# Patient Record
Sex: FEMALE | Race: WHITE | NOT HISPANIC OR LATINO | Employment: FULL TIME | URBAN - NONMETROPOLITAN AREA
[De-identification: names, ages, dates, MRNs, and addresses within clinical notes are randomized per-mention and may not be internally consistent; named-entity substitution may affect disease eponyms.]

---

## 2017-04-14 DIAGNOSIS — K21.9 GASTROESOPHAGEAL REFLUX DISEASE, ESOPHAGITIS PRESENCE NOT SPECIFIED: ICD-10-CM

## 2017-04-14 RX ORDER — OMEPRAZOLE 40 MG/1
CAPSULE, DELAYED RELEASE ORAL
Qty: 30 CAPSULE | Refills: 2 | Status: SHIPPED | OUTPATIENT
Start: 2017-04-14 | End: 2017-06-29

## 2017-05-01 ENCOUNTER — TRANSFERRED RECORDS (OUTPATIENT)
Dept: HEALTH INFORMATION MANAGEMENT | Facility: HOSPITAL | Age: 22
End: 2017-05-01

## 2017-06-16 ENCOUNTER — HOSPITAL ENCOUNTER (EMERGENCY)
Facility: HOSPITAL | Age: 22
Discharge: HOME | End: 2017-06-16
Attending: EMERGENCY MEDICINE | Admitting: EMERGENCY MEDICINE
Payer: COMMERCIAL

## 2017-06-16 VITALS
SYSTOLIC BLOOD PRESSURE: 115 MMHG | TEMPERATURE: 98.1 F | DIASTOLIC BLOOD PRESSURE: 95 MMHG | OXYGEN SATURATION: 99 % | RESPIRATION RATE: 16 BRPM

## 2017-06-16 DIAGNOSIS — F45.8 HYPERVENTILATION SYNDROME: ICD-10-CM

## 2017-06-16 DIAGNOSIS — R07.89 CHEST WALL PAIN: ICD-10-CM

## 2017-06-16 PROCEDURE — 96372 THER/PROPH/DIAG INJ SC/IM: CPT

## 2017-06-16 PROCEDURE — 99283 EMERGENCY DEPT VISIT LOW MDM: CPT | Performed by: EMERGENCY MEDICINE

## 2017-06-16 PROCEDURE — 99284 EMERGENCY DEPT VISIT MOD MDM: CPT | Mod: 25

## 2017-06-16 PROCEDURE — 25000132 ZZH RX MED GY IP 250 OP 250 PS 637: Performed by: EMERGENCY MEDICINE

## 2017-06-16 PROCEDURE — 25000128 H RX IP 250 OP 636: Performed by: EMERGENCY MEDICINE

## 2017-06-16 RX ORDER — CLONAZEPAM 1 MG/1
1 TABLET ORAL 2 TIMES DAILY
Status: DISCONTINUED | OUTPATIENT
Start: 2017-06-16 | End: 2017-06-16 | Stop reason: HOSPADM

## 2017-06-16 RX ORDER — KETOROLAC TROMETHAMINE 30 MG/ML
60 INJECTION, SOLUTION INTRAMUSCULAR; INTRAVENOUS ONCE
Status: COMPLETED | OUTPATIENT
Start: 2017-06-16 | End: 2017-06-16

## 2017-06-16 RX ADMIN — KETOROLAC TROMETHAMINE 60 MG: 30 INJECTION, SOLUTION INTRAMUSCULAR at 14:23

## 2017-06-16 RX ADMIN — CLONAZEPAM 1 MG: 1 TABLET ORAL at 14:23

## 2017-06-16 NOTE — ED NOTES
Discharge instructions reviewed with patient. No questions or concerns. Denies any pain. Encouraged to return with new or worsening symptoms.

## 2017-06-16 NOTE — ED AVS SNAPSHOT
[facility]    [address] [place] [address]    [place] [address]    Phone:  [phone]                                       Karrie Oliveira   MRN: [id number]    Department:  [facility]   Date of Visit:  6/16/2017           Patient Information     Date Of Birth          1995        Your diagnoses for this visit were:     Chest wall pain     Hyperventilation syndrome        You were seen by Kailash Severino MD.      Follow-up Information     Follow up with Jenae Hayward MD.    Specialty:  Family Practice    Why:  As needed    Contact information:    [facility]  [address] [place] [address]  [phone]          Discharge Instructions         Chest Wall Pain: Costochondritis    The chest pain that you have had today is caused by costochondritis. This condition is caused by an inflammation of the cartilage joining your ribs to your breastbone. It is not caused by heart or lung problems. Your healthcare team has made sure that the chest pain you feel is not from a life threatening cause of chest pain such as heart attack, collapsed lung, blood clot in the lung, tear in the aorta, or esophageal rupture. The inflammation may have been brought on by a blow to the chest, lifting heavy objects, intense exercise, or an illness that made you cough and sneeze a lot. It often occurs during times of emotional stress. It can be painful, but it is not dangerous. It usually goes away in 1 to 2 weeks. But it may happen again. Rarely, a more serious condition may cause symptoms similar to costochondritis. That s why it s important to watch for the warning signs listed below.  Home care  Follow these guidelines when caring for yourself at home:    If you feel that emotional stress is a cause of your condition, try to figure out the sources of that stress. It may not be obvious. Learn ways to deal with the stress in your life. This can include regular exercise, muscle relaxation,  meditation, or simply taking time out for yourself.    You may use acetaminophen, ibuprofen, or naproxen to control pain, unless another pain medicine was prescribed. If you have liver or kidney disease or ever had a stomach ulcer, talk with your healthcare provider before using these medicines.    You can also help ease pain by using a hot, wet compress or heating pad. Use this with or without a medicated skin cream that helps relieves pain.    Do stretching exercise as advised by your provider.    Take any prescribed medicines as directed.  Follow-up care  Follow up with your healthcare provider, or as advised, if you do not start to get better in the next 2 days.  When to seek medical advice  Call your healthcare provider right away if any of these occur:    A change in the type of pain. Call if it feels different, becomes more serious, lasts longer, or spreads into your shoulder, arm, neck, jaw, or back.    Shortness of breath or pain gets worse when you breathe    Weakness, dizziness, or fainting    Cough with dark-colored sputum (phlegm) or blood    Abdominal pain    Dark red or black stools    Fever of 100.4 F (38 C) or higher, or as directed by your healthcare provider  Date Last Reviewed: 12/1/2016 2000-2017 [employer]. [address] [place] [address], [place] [address]. All rights reserved. This information is not intended as a substitute for professional medical care. Always follow your healthcare professional's instructions.          Hyperventilation Syndrome  Hyperventilation syndrome is the medical term for losing control of your breathing. You may find yourself breathing too fast or too deeply. This can be triggered by pain, anxiety, or emotional stress. If hyperventilation continues for more than a few minutes, it can lead to a number of frightening symptoms, such as:    Numbness and tingling of the hands, feet, and face    Clenching of the fingers or toes    Dizziness    Feeling like you  cannot get enough air    Chest pains    Fainting or feeling like you are going to faint  Once these symptoms begin, it is often hard to stop them, because they lead to a cycle of more anxiety and more hyperventilation. It is important to understand that this is not a life-threatening condition. It will pass once you are able to relax. Relaxation and stress-management techniques can be learned and practiced when you are not hyperventilating. These techniques can help in the event of a future attack.  Home care  Rest today until you feel back to normal. If symptoms return, take the following steps to care for yourself:    Sit or lie down. Remember that what is happening to you is temporary and will pass.    Use the relaxation methods you have learned.  Note: It is no longer recommended to breathe into a paper bag.  Follow-up care  Follow up with your healthcare provider, or as advised.  When to seek medical advice  Call your healthcare provider right away if any of these occur:    Fever of 100.4 F (38 C) or higher    Redness, pain, or swelling of the leg    Ringing in your ears    Severe headache  Call 911, or get immediate medical care  Contact emergency services right away if any of these occur:    Weakness or fainting    Increasing shortness of breath    Coughing up blood    Chest pain that is made worse with each breath  Date Last Reviewed: 9/13/2015 2000-2017 [employer]. [address] [place] [address], [place] [address]. All rights reserved. This information is not intended as a substitute for professional medical care. Always follow your healthcare professional's instructions.          Understanding Hyperventilation Syndrome  When you breathe, you get oxygen from the air you inhale. You then let out carbon dioxide with the air you exhale. Hyperventilation syndrome is a pattern of breathing during which you breathe more quickly and deeply than normal. This can be very distressing to experience. If it  goes on for some time, it can cause the level of carbon dioxide in the blood to get too low. This can lead to symptoms felt throughout the body.  What causes hyperventilation syndrome?  Hyperventilation syndrome may be caused from things such as:    Anxiety or panic (most common)    Pregnancy    Bleeding    Infection    Certain heart and lung problems  Symptoms of hyperventilation syndrome  Symptoms can include:    Fast or deep breathing    Shortness of breath or the feeling that you can t get enough air    Anxiety, fear, panic, or strong feeling of dread or doom    Dizziness    Chest pain or squeezing in the chest    Fast, pounding, or skipping heartbeat    Sweating    Numbness or tingling around the mouth and in the fingers    Muscle cramps in the hands or feet  Treatment for hyperventilation syndrome  Treatment is focused on getting your breathing rate and level of carbon dioxide in the blood back to normal. If you are being treated in a hospital or healthcare provider s office, the following may be done:    A healthcare provider may monitor the level of oxygen in your blood with a pulse oximeter.    A nurse or other healthcare provider will talk with you and help you to stay calm.    You may be asked to try various breathing exercises, such as pursed-lip breathing. This helps slow down your breathing. You may also be asked to hold your breath for short periods.    If needed, you may be told to breathe into a tube.    You may also be given medicine to help you relax.  How can hyperventilation syndrome be prevented?  To help prevent further episodes in the future, you may be told to try:    Breathing exercises    Relaxation methods such as meditation or progressive muscle relaxation    Regular exercise    Counseling or medicines to help manage an anxiety or panic disorder  Possible complications of hyperventilation syndrome  If the level of carbon dioxide becomes dangerously low, this is called hypocapnia. It can  upset the acid-base balance in the blood and cause problems such as fainting and seizures.  Other possible complications of hyperventilation syndrome will vary depending on the cause.  When to call your healthcare provider  Call your healthcare provider right away if you have any of these:    Fever of 100.4 F (38 C) or higher, or as directed    Symptoms that don t get better with treatment, or symptoms that get worse    New symptoms   Date Last Reviewed: 5/1/2016 2000-2017 [employer]. [address] [place] [address], [place] [address]. All rights reserved. This information is not intended as a substitute for professional medical care. Always follow your healthcare professional's instructions.      Karrie,   Your chest wall pain behind your sternum (breast bone) seemed to trigger a panic/hyperventilation episode after you got out of the cold water shivering.  The numbness, tingling, lightheadedness with visual type changes all are part of a severe hyperventilation.  Fortunately, you were able to get these to reverse by relaxing.   Wait another 2 weeks before hitting the beach again.   In the meantime enjoy the summer.   Good luck!        Review of your medicines      Our records show that you are taking the medicines listed below. If these are incorrect, please call your family doctor or clinic.        Dose / Directions Last dose taken    ibuprofen 200 MG capsule   Dose:  600 mg   Quantity:  120 capsule        Take 600 mg by mouth every 6 hours as needed   Refills:  0        omeprazole 40 MG capsule   Commonly known as:  priLOSEC   Quantity:  30 capsule        TAKE 1 CAPSULE BY MOUTH ONC E DAILY   Refills:  2                Orders Needing Specimen Collection     None      Pending Results     No orders found from 6/14/2017 to 6/17/2017.            Pending Culture Results     No orders found from 6/14/2017 to 6/17/2017.            Thank you for choosing [facility]       Thank you for choosing [facility] for your  "care. Our goal is always to provide you with excellent care. Hearing back from our patients is one way we can continue to improve our services. Please take a few minutes to complete the written survey that you may receive in the mail after you visit with us. Thank you!        [employer] Information     [employer] lets you send messages to your doctor, view your test results, renew your prescriptions, schedule appointments and more. To sign up, go to [email or web address] . Click on \"Log in\" on the left side of the screen, which will take you to the Welcome page. Then click on \"Sign up Now\" on the right side of the page.     You will be asked to enter the access code listed below, as well as some personal information. Please follow the directions to create your username and password.     Your access code is: [email or web address]  Expires: 2017  2:26 PM     Your access code will  in 90 days. If you need help or a new code, please call your [facility] clinic or [phone].        Care EveryWhere ID     This is your Care EveryWhere ID. This could be used by other organizations to access your [facility] medical records  [id number]        After Visit Summary       This is your record. Keep this with you and show to your community pharmacist(s) and doctor(s) at your next visit.                  "

## 2017-06-16 NOTE — DISCHARGE INSTRUCTIONS
Chest Wall Pain: Costochondritis    The chest pain that you have had today is caused by costochondritis. This condition is caused by an inflammation of the cartilage joining your ribs to your breastbone. It is not caused by heart or lung problems. Your healthcare team has made sure that the chest pain you feel is not from a life threatening cause of chest pain such as heart attack, collapsed lung, blood clot in the lung, tear in the aorta, or esophageal rupture. The inflammation may have been brought on by a blow to the chest, lifting heavy objects, intense exercise, or an illness that made you cough and sneeze a lot. It often occurs during times of emotional stress. It can be painful, but it is not dangerous. It usually goes away in 1 to 2 weeks. But it may happen again. Rarely, a more serious condition may cause symptoms similar to costochondritis. That s why it s important to watch for the warning signs listed below.  Home care  Follow these guidelines when caring for yourself at home:    If you feel that emotional stress is a cause of your condition, try to figure out the sources of that stress. It may not be obvious. Learn ways to deal with the stress in your life. This can include regular exercise, muscle relaxation, meditation, or simply taking time out for yourself.    You may use acetaminophen, ibuprofen, or naproxen to control pain, unless another pain medicine was prescribed. If you have liver or kidney disease or ever had a stomach ulcer, talk with your healthcare provider before using these medicines.    You can also help ease pain by using a hot, wet compress or heating pad. Use this with or without a medicated skin cream that helps relieves pain.    Do stretching exercise as advised by your provider.    Take any prescribed medicines as directed.  Follow-up care  Follow up with your healthcare provider, or as advised, if you do not start to get better in the next 2 days.  When to seek medical  advice  Call your healthcare provider right away if any of these occur:    A change in the type of pain. Call if it feels different, becomes more serious, lasts longer, or spreads into your shoulder, arm, neck, jaw, or back.    Shortness of breath or pain gets worse when you breathe    Weakness, dizziness, or fainting    Cough with dark-colored sputum (phlegm) or blood    Abdominal pain    Dark red or black stools    Fever of 100.4 F (38 C) or higher, or as directed by your healthcare provider  Date Last Reviewed: 12/1/2016 2000-2017 [employer]. [address] [place] [address]. All rights reserved. This information is not intended as a substitute for professional medical care. Always follow your healthcare professional's instructions.          Hyperventilation Syndrome  Hyperventilation syndrome is the medical term for losing control of your breathing. You may find yourself breathing too fast or too deeply. This can be triggered by pain, anxiety, or emotional stress. If hyperventilation continues for more than a few minutes, it can lead to a number of frightening symptoms, such as:    Numbness and tingling of the hands, feet, and face    Clenching of the fingers or toes    Dizziness    Feeling like you cannot get enough air    Chest pains    Fainting or feeling like you are going to faint  Once these symptoms begin, it is often hard to stop them, because they lead to a cycle of more anxiety and more hyperventilation. It is important to understand that this is not a life-threatening condition. It will pass once you are able to relax. Relaxation and stress-management techniques can be learned and practiced when you are not hyperventilating. These techniques can help in the event of a future attack.  Home care  Rest today until you feel back to normal. If symptoms return, take the following steps to care for yourself:    Sit or lie down. Remember that what is happening to you is temporary  and will pass.    Use the relaxation methods you have learned.  Note: It is no longer recommended to breathe into a paper bag.  Follow-up care  Follow up with your healthcare provider, or as advised.  When to seek medical advice  Call your healthcare provider right away if any of these occur:    Fever of 100.4 F (38 C) or higher    Redness, pain, or swelling of the leg    Ringing in your ears    Severe headache  Call 911, or get immediate medical care  Contact emergency services right away if any of these occur:    Weakness or fainting    Increasing shortness of breath    Coughing up blood    Chest pain that is made worse with each breath  Date Last Reviewed: 9/13/2015 2000-2017 [employer]. [address] [place] [address], [place] [address]. All rights reserved. This information is not intended as a substitute for professional medical care. Always follow your healthcare professional's instructions.          Understanding Hyperventilation Syndrome  When you breathe, you get oxygen from the air you inhale. You then let out carbon dioxide with the air you exhale. Hyperventilation syndrome is a pattern of breathing during which you breathe more quickly and deeply than normal. This can be very distressing to experience. If it goes on for some time, it can cause the level of carbon dioxide in the blood to get too low. This can lead to symptoms felt throughout the body.  What causes hyperventilation syndrome?  Hyperventilation syndrome may be caused from things such as:    Anxiety or panic (most common)    Pregnancy    Bleeding    Infection    Certain heart and lung problems  Symptoms of hyperventilation syndrome  Symptoms can include:    Fast or deep breathing    Shortness of breath or the feeling that you can t get enough air    Anxiety, fear, panic, or strong feeling of dread or doom    Dizziness    Chest pain or squeezing in the chest    Fast, pounding, or skipping heartbeat    Sweating    Numbness or tingling  around the mouth and in the fingers    Muscle cramps in the hands or feet  Treatment for hyperventilation syndrome  Treatment is focused on getting your breathing rate and level of carbon dioxide in the blood back to normal. If you are being treated in a hospital or healthcare provider s office, the following may be done:    A healthcare provider may monitor the level of oxygen in your blood with a pulse oximeter.    A nurse or other healthcare provider will talk with you and help you to stay calm.    You may be asked to try various breathing exercises, such as pursed-lip breathing. This helps slow down your breathing. You may also be asked to hold your breath for short periods.    If needed, you may be told to breathe into a tube.    You may also be given medicine to help you relax.  How can hyperventilation syndrome be prevented?  To help prevent further episodes in the future, you may be told to try:    Breathing exercises    Relaxation methods such as meditation or progressive muscle relaxation    Regular exercise    Counseling or medicines to help manage an anxiety or panic disorder  Possible complications of hyperventilation syndrome  If the level of carbon dioxide becomes dangerously low, this is called hypocapnia. It can upset the acid-base balance in the blood and cause problems such as fainting and seizures.  Other possible complications of hyperventilation syndrome will vary depending on the cause.  When to call your healthcare provider  Call your healthcare provider right away if you have any of these:    Fever of 100.4 F (38 C) or higher, or as directed    Symptoms that don t get better with treatment, or symptoms that get worse    New symptoms   Date Last Reviewed: 5/1/2016 2000-2017 [employer]. [address] [place] [address], [place] [address]. All rights reserved. This information is not intended as a substitute for professional medical care. Always follow your healthcare professional's  instructions.      Karrie,   Your chest wall pain behind your sternum (breast bone) seemed to trigger a panic/hyperventilation episode after you got out of the cold water shivering.  The numbness, tingling, lightheadedness with visual type changes all are part of a severe hyperventilation.  Fortunately, you were able to get these to reverse by relaxing.   Wait another 2 weeks before hitting the beach again.   In the meantime enjoy the summer.   Good luck!

## 2017-06-16 NOTE — ED AVS SNAPSHOT
[facility]    [address] [address]    Phone:  [phone]                                       Karrie Oliveira   MRN: [id number]    Department:  [facility]   Date of Visit:  6/16/2017           After Visit Summary Signature Page     I have received my discharge instructions, and my questions have been answered. I have discussed any challenges I see with this plan with the nurse or doctor.    ..........................................................................................................................................  Patient/Patient Representative Signature      ..........................................................................................................................................  Patient Representative Print Name and Relationship to Patient    ..................................................               ................................................  Date                                            Time    ..........................................................................................................................................  Reviewed by Signature/Title    ...................................................              ..............................................  Date                                                            Time

## 2017-06-16 NOTE — ED PROVIDER NOTES
History     Chief Complaint   Patient presents with     hurts to breathe     c/o hurts to breathe, notes pain started after getting out of the water and comes and goes     Dizziness     c/o dizziness x 5 min when she was having the pain with breathing, notes problems with anxiety     HPI  Karrie Oliveira is a 21 year old female with the above hx.  She has hx of panic attacks and some anxiety but generally well controlled.  Today's evolution from retrosternal chest wall pain as she got out of the cold water at [facility] to full hyperventilation syndrome with numbness, carpopedal spasm, and presyncopal liteheaded was sudden and more intense than anything she had experienced.  She was able to bring it under control but wanted to be check.  Her main medical issues are morbid BMI increase with metabolic consequences including GERD.    I have reviewed the Medications, Allergies, Past Medical and Surgical History, and Social History in the Epic system.    Review of Systems   HENT: Negative.    Respiratory: Positive for chest tightness and shortness of breath.    Cardiovascular: Positive for chest pain.   Gastrointestinal: Negative.    Genitourinary: Negative.    Musculoskeletal: Negative.    Skin: Negative.    Neurological: Positive for dizziness, weakness, light-headedness and numbness.   Psychiatric/Behavioral: The patient is nervous/anxious.      Physical Exam   BP: 152/92  Heart Rate: 112  Temp: 98.8  F (37.1  C)  Resp: 18  Physical Exam   Constitutional: She appears well-developed.   Increased BMI female pleasant expressive with meticulous makeup and wet pony and obvious anxiety.   HENT:   Head: Normocephalic and atraumatic.   Eyes: Conjunctivae and EOM are normal. Pupils are equal, round, and reactive to light.   Neck: Normal range of motion. Neck supple.   Cardiovascular: Normal rate.    Pulmonary/Chest: Effort normal. No respiratory distress. She has no wheezes. She exhibits tenderness.   Upper mannubrial  sternal jct area direct tenderness as trigger point   Abdominal: Soft. There is no tenderness.   Musculoskeletal: Normal range of motion.   Neurological: She is alert.   Skin: Skin is warm.   Vitals reviewed.    ED Course     ED Course     Procedures  Critical Care time:  none    Assessments & Plan (with Medical Decision Making)   Karrie had an obvious episode of hyperventilation triggered off some retrosternal chest wall discomfort.  She seems to have recovered with further testing and previous such hx making additional workup unnecessary at this point.  Given toradol 60mg IM for the pain and klonopin 1mg po for preventing a recurrence today.  She and her fiance appear relieved.   I have reviewed the nursing notes.    I have reviewed the findings, diagnosis, plan and need for follow up with the patient.       New Prescriptions    No medications on file       Final diagnoses:   Chest wall pain   Hyperventilation syndrome       6/16/2017   [facility]     Kailash Severino MD  06/17/17 0802

## 2017-06-16 NOTE — ED NOTES
"Presents to ER with c/o \"dizzy spell and episode of shortness of breath, occurring approx 1-2 hours ago, [place] beach.\" Ambulated to room 6 without any noted difficulty. States upon getting out of water, she suddenly felt faint, light headed and that she felt urge like she was going to have to go to bathroom.\" Legs began to feel weak, onset of chest pain and she lost her vision and could only see \"purple for 5 minutes.\" She sat down and waited for episode to \"be over.\"  See assessments. Call light placed within reach.   "

## 2017-06-17 ASSESSMENT — ENCOUNTER SYMPTOMS
GASTROINTESTINAL NEGATIVE: 1
DIZZINESS: 1
NUMBNESS: 1
LIGHT-HEADEDNESS: 1
SHORTNESS OF BREATH: 1
WEAKNESS: 1
MUSCULOSKELETAL NEGATIVE: 1
CHEST TIGHTNESS: 1
NERVOUS/ANXIOUS: 1

## 2017-06-29 ENCOUNTER — OFFICE VISIT (OUTPATIENT)
Dept: FAMILY MEDICINE | Facility: OTHER | Age: 22
End: 2017-06-29
Attending: FAMILY MEDICINE
Payer: COMMERCIAL

## 2017-06-29 VITALS
BODY MASS INDEX: 66.9 KG/M2 | OXYGEN SATURATION: 98 % | DIASTOLIC BLOOD PRESSURE: 86 MMHG | HEART RATE: 74 BPM | WEIGHT: 293 LBS | SYSTOLIC BLOOD PRESSURE: 116 MMHG | TEMPERATURE: 98 F

## 2017-06-29 DIAGNOSIS — F40.10 SOCIAL ANXIETY DISORDER: ICD-10-CM

## 2017-06-29 DIAGNOSIS — K21.9 GASTROESOPHAGEAL REFLUX DISEASE, ESOPHAGITIS PRESENCE NOT SPECIFIED: ICD-10-CM

## 2017-06-29 DIAGNOSIS — F41.0 PANIC ATTACK: Primary | ICD-10-CM

## 2017-06-29 DIAGNOSIS — Z71.89 ACP (ADVANCE CARE PLANNING): Chronic | ICD-10-CM

## 2017-06-29 LAB — TSH SERPL DL<=0.005 MIU/L-ACNC: 2.24 MU/L (ref 0.4–4)

## 2017-06-29 PROCEDURE — 84443 ASSAY THYROID STIM HORMONE: CPT | Performed by: FAMILY MEDICINE

## 2017-06-29 PROCEDURE — 36415 COLL VENOUS BLD VENIPUNCTURE: CPT | Performed by: FAMILY MEDICINE

## 2017-06-29 PROCEDURE — 99214 OFFICE O/P EST MOD 30 MIN: CPT | Performed by: FAMILY MEDICINE

## 2017-06-29 RX ORDER — HYDROXYZINE PAMOATE 25 MG/1
25-50 CAPSULE ORAL 3 TIMES DAILY PRN
Qty: 60 CAPSULE | Refills: 1 | Status: SHIPPED | OUTPATIENT
Start: 2017-06-29 | End: 2018-09-05

## 2017-06-29 RX ORDER — OMEPRAZOLE 40 MG/1
40 CAPSULE, DELAYED RELEASE ORAL DAILY
Qty: 90 CAPSULE | Refills: 1 | Status: SHIPPED | OUTPATIENT
Start: 2017-06-29 | End: 2018-09-07

## 2017-06-29 ASSESSMENT — ANXIETY QUESTIONNAIRES
6. BECOMING EASILY ANNOYED OR IRRITABLE: NOT AT ALL
2. NOT BEING ABLE TO STOP OR CONTROL WORRYING: MORE THAN HALF THE DAYS
GAD7 TOTAL SCORE: 8
5. BEING SO RESTLESS THAT IT IS HARD TO SIT STILL: NOT AT ALL
IF YOU CHECKED OFF ANY PROBLEMS ON THIS QUESTIONNAIRE, HOW DIFFICULT HAVE THESE PROBLEMS MADE IT FOR YOU TO DO YOUR WORK, TAKE CARE OF THINGS AT HOME, OR GET ALONG WITH OTHER PEOPLE: SOMEWHAT DIFFICULT
1. FEELING NERVOUS, ANXIOUS, OR ON EDGE: MORE THAN HALF THE DAYS
3. WORRYING TOO MUCH ABOUT DIFFERENT THINGS: MORE THAN HALF THE DAYS
7. FEELING AFRAID AS IF SOMETHING AWFUL MIGHT HAPPEN: SEVERAL DAYS

## 2017-06-29 ASSESSMENT — PATIENT HEALTH QUESTIONNAIRE - PHQ9: 5. POOR APPETITE OR OVEREATING: SEVERAL DAYS

## 2017-06-29 ASSESSMENT — PAIN SCALES - GENERAL: PAINLEVEL: NO PAIN (0)

## 2017-06-29 NOTE — PATIENT INSTRUCTIONS
Start Zoloft 25 mg daily, increasing to 50 mg after 1 week if tolerating.  Vistaril only as needed for panic attacks.  Will call with thyroid lab result.  Counseling encouraged.  Follow up 1 month, sooner if concerns.  Psychologists/ counselors  [place] Harry  [phone]  Dr. Lev Peters [phone]  [facility]  [phone]  [facility] [phone]  Merlin Balbuena  [phone]   [employer]  [phone]  (kids)  [employer] [phone]  (teens)  [facility]      [phone]  [facility] [phone]    [facility]     [phone]   [facility] counseling [phone]  [facility]  [phone]  Jacob Martinez [phone]  Karrie Cisse [phone]  [facility]     [phone]  Childrens behavioral/ adult family     [phone]  [facility]     [phone]  [facility]     [phone]  [place]  Brad Schafer  [phone]  [facility]     [phone]  [facility] Dr. JULIAN Ramos     [phone]  [facility] Services     [phone]

## 2017-06-29 NOTE — MR AVS SNAPSHOT
After Visit Summary   6/29/2017    Karrie Oliveira    MRN: [id number]           Patient Information     Date Of Birth          1995        Visit Information        Provider Department      6/29/2017 4:00 PM [facility]        Today's Diagnoses     Panic attack    -  1    Social anxiety disorder        ACP (advance care planning)          Care Instructions    Start Zoloft 25 mg daily, increasing to 50 mg after 1 week if tolerating.  Vistaril only as needed for panic attacks.  Will call with thyroid lab result.  Counseling encouraged.  Follow up 1 month, sooner if concerns.  Psychologists/ counselors  [place]  [phone]  Dr. Lev Peters [phone]  [facility]  [phone]  [facility] [phone]  Merlin Balbuena  [phone]   [employer]  [phone]  (kids)  [facility] [phone]  (teens)  [facility]      [phone]  [facility] [phone]    [facility]     [phone]   [facility] counseling [phone]  [facility]  [phone]  Jacob Martinez [phone]  Karrie Cisse [phone]  [facility]     [phone]  Children[facility] behavioral/ adult family     [phone]  [facility] Health & Wellness     [phone]  [facility]     [phone]  [place]  Brad Schafer  [phone]  [facility]     [phone]  [facility] Dr. JULIAN Ramos     [phone]  [facility] Psychological Services     [phone]                        Follow-ups after your visit        Who to contact     If you have questions or need follow up information about today's clinic visit or your schedule please contact [facility] directly at [phone].  Normal or non-critical lab and imaging results will be communicated to you by MyChart, letter or phone within 4 business days after  the clinic has received the results. If you do not hear from us within 7 days, please contact the clinic through [employer] or phone. If you have a critical or abnormal lab result, we will notify you by phone as soon as possible.  Submit refill requests through [employer] or call your pharmacy and they will forward the refill request to us. Please allow 3 business days for your refill to be completed.          Additional Information About Your Visit        Care EveryWhere ID     This is your Care EveryWhere ID. This could be used by other organizations to access your [place] medical records  [id number]        Your Vitals Were     Pulse Temperature Pulse Oximetry BMI (Body Mass Index)          74 98  F (36.7  C) (Tympanic) 98% 66.9 kg/m2         Blood Pressure from Last 3 Encounters:   06/29/17 116/86   06/16/17 115/95   12/29/16 (!) 155/110    Weight from Last 3 Encounters:   06/29/17 (!) 402 lb (182.3 kg)   08/31/16 (!) 392 lb (177.8 kg)   05/12/16 (!) 380 lb (172.4 kg)              We Performed the Following     TSH with free T4 reflex          Today's Medication Changes          These changes are accurate as of: 6/29/17  4:29 PM.  If you have any questions, ask your nurse or doctor.               Start taking these medicines.        Dose/Directions    hydrOXYzine 25 MG capsule   Commonly known as:  VISTARIL   Used for:  Panic attack, Social anxiety disorder   Started by:  Jenae Hayward MD        Dose:  25-50 mg   Take 1-2 capsules (25-50 mg) by mouth 3 times daily as needed for anxiety or other (insomnia)   Quantity:  60 capsule   Refills:  1       sertraline 50 MG tablet   Commonly known as:  ZOLOFT   Used for:  Social anxiety disorder, Panic attack   Started by:  Jenae Hayward MD        Take 1/2 tablet (25 mg) for 1-2 weeks, then increase to 1 tablet orally daily   Quantity:  30 tablet   Refills:  1            Where to get your medicines      These medications were sent to [id number] -  [place], [place] - [address]  [address] [place] [address]     Phone:  [phone]     hydrOXYzine 25 MG capsule    sertraline 50 MG tablet                Primary Care Provider Office Phone # Fax #    Jenae Hayward [phone]       [facility] [address]  [place] [address]        Equal Access to Services     [facility] Center[facility] : Hadii aad ku hadasho Soomaali, waaxda luqadaha, qaybta kaalmada adeegyada, waxay idiin hayaan adeeg kharash la'aan ah. So [facility] [phone].    ATENCIÓN: Si habla español, tiene a mak disposición servicios gratuitos de asistencia lingüística. Leslyame al [phone].    We comply with applicable federal civil rights laws and [place] laws. We do not discriminate on the basis of race, color, national origin, age, disability sex, sexual orientation or gender identity.            Thank you!     Thank you for choosing [facility]  for your care. Our goal is always to provide you with excellent care. Hearing back from our patients is one way we can continue to improve our services. Please take a few minutes to complete the written survey that you may receive in the mail after your visit with us. Thank you!             Your Updated Medication List - Protect others around you: Learn how to safely use, store and throw away your medicines at www.disposemymeds.org.          This list is accurate as of: 6/29/17  4:29 PM.  Always use your most recent med list.                   Brand Name Dispense Instructions for use Diagnosis    hydrOXYzine 25 MG capsule    VISTARIL    60 capsule    Take 1-2 capsules (25-50 mg) by mouth 3 times daily as needed for anxiety or other (insomnia)    Panic attack, Social anxiety disorder       ibuprofen 200 MG capsule     120 capsule    Take 600 mg by mouth every 6 hours as needed        omeprazole 40 MG capsule    priLOSEC    30 capsule    TAKE 1 CAPSULE BY MOUTH ONC E DAILY    Gastroesophageal reflux disease, esophagitis  presence not specified       sertraline 50 MG tablet    ZOLOFT    30 tablet    Take 1/2 tablet (25 mg) for 1-2 weeks, then increase to 1 tablet orally daily    Social anxiety disorder, Panic attack

## 2017-06-29 NOTE — NURSING NOTE
"Chief Complaint   Patient presents with     Anxiety     been ongoing for 2 years. Sleep loss.        Initial /86 (BP Location: Right arm, Patient Position: Chair, Cuff Size: Adult Large)  Pulse 74  Temp 98  F (36.7  C) (Tympanic)  Wt (!) 402 lb (182.3 kg)  SpO2 98%  BMI 66.9 kg/m2 Estimated body mass index is 66.9 kg/(m^2) as calculated from the following:    Height as of 8/31/16: 5' 5\" (1.651 m).    Weight as of this encounter: 402 lb (182.3 kg).  Medication Reconciliation: complete   Adilia Mendez LPN    "

## 2017-06-29 NOTE — PROGRESS NOTES
SUBJECTIVE:  Karrie is a 21 year old female who comes in today for anxiety.  Was in ER 6/16/17 with panic attack, dyspnea, dizziness, with hyperventilation and chest wall pain.  Given Toradol and Klonopin.  Here with significant other of 3 years.  No specific stressors, triggers.  Stable relationship, no financial concerns.  She has been unable to work due to her anxiety, holding a job for a week to a month at a time. Is anxious around people or in social situations.  No family history of mental health concerns.  No alcohol, tobacco, or drug use.  No prior counseling or medication.  Anxiety has been for years.    Current Outpatient Prescriptions   Medication     omeprazole (PRILOSEC) 40 MG capsule     ibuprofen 200 MG capsule     No current facility-administered medications for this visit.       No Known Allergies    Past Medical History:   Diagnosis Date     GERD (gastroesophageal reflux disease)      Obesity      Prediabetes 9/2/2016     Social History     Social History     Marital status: Single     Spouse name: N/A     Number of children: N/A     Years of education: N/A     Occupational History     Not on file.     Social History Main Topics     Smoking status: Never Smoker     Smokeless tobacco: Never Used     Alcohol use No     Drug use: No     Sexual activity: Yes     Partners: Male     Other Topics Concern      Service No     Blood Transfusions Yes     Permits if needed     Caffeine Concern Yes     none     Parent/Sibling W/ Cabg, Mi Or Angioplasty Before 65f 55m? No     Social History Narrative    5/15: Karrie lives with her boyfriend.  No children.  Not working and not in school.        ROS:  General: negative  Skin: negative  Resp: negative  CV: negative  Psychiatric: positive for as above and anxiety  PHQ-9 SCORE 6/29/2017   Total Score 4     JUAN-7 SCORE 6/29/2017   Total Score 8       OBJECTIVE:  Vitals:    06/29/17 1558   BP: 116/86   BP Location: Right arm   Patient Position: Chair   Cuff  Size: Adult Large   Pulse: 74   Temp: 98  F (36.7  C)   TempSrc: Tympanic   SpO2: 98%   Weight: (!) 402 lb (182.3 kg)     GENERAL APPEARANCE: alert, no distress, cooperative and morbidly obese  RESP: lungs clear to auscultation - no rales, rhonchi or wheezes  CV: regular rates and rhythm, normal S1 S2, no S3 or S4 and no murmur, click or rub -  MS: extremities normal- no gross deformities noted, no evidence of inflammation in joints, FROM in all extremities.  PSYCH: mentation appears normal. and affect normal/bright    ASSESSMENT/ORDERS:    ICD-10-CM    1. ACP (advance care planning) Z71.89      PLAN:  Patient Instructions   Start Zoloft 25 mg daily, increasing to 50 mg after 1 week if tolerating.  Vistaril only as needed for panic attacks.  Will call with thyroid lab result.  Counseling encouraged.  Follow up 1 month, sooner if concerns.  Psychologists/ counselors  [place] Harry  [phone]  Dr. Lev Peters [phone]  [facility]  [phone]  [facility] [phone]  Merlin Balbuena  [phone]   "[employer]"  [phone]  (kids)  "[employer]" [phone]  (teens)  [facility] Behavioral Health      [phone]  [facility] Mental Health [phone]    [facility]     [phone]   [facility] counseling [phone]  [facility]  [phone]  Jacob Martinez [phone]  Karrie Cisse [phone]  [facility]     [phone]  Childrens behavioral/ adult family     [phone]  [facility] Psych/ Health & Wellness     [phone]  [facility]     [phone]  Nida Schafer  [phone]  [facility]     [phone]  [facility] Dr. JULIAN Ramos     [phone]  [facility] Services     [phone]                      Jenae Griffith

## 2017-06-30 ASSESSMENT — PATIENT HEALTH QUESTIONNAIRE - PHQ9: SUM OF ALL RESPONSES TO PHQ QUESTIONS 1-9: 4

## 2017-06-30 ASSESSMENT — ANXIETY QUESTIONNAIRES: GAD7 TOTAL SCORE: 8

## 2017-11-26 ENCOUNTER — HEALTH MAINTENANCE LETTER (OUTPATIENT)
Age: 22
End: 2017-11-26

## 2017-12-20 ENCOUNTER — TRANSFERRED RECORDS (OUTPATIENT)
Dept: HEALTH INFORMATION MANAGEMENT | Facility: HOSPITAL | Age: 22
End: 2017-12-20

## 2018-02-18 ENCOUNTER — TRANSFERRED RECORDS (OUTPATIENT)
Dept: HEALTH INFORMATION MANAGEMENT | Facility: HOSPITAL | Age: 23
End: 2018-02-18

## 2018-02-27 ENCOUNTER — TRANSFERRED RECORDS (OUTPATIENT)
Dept: HEALTH INFORMATION MANAGEMENT | Facility: CLINIC | Age: 23
End: 2018-02-27

## 2018-02-27 LAB
C TRACH DNA SPEC QL PROBE+SIG AMP: NEGATIVE
N GONORRHOEA DNA SPEC QL PROBE+SIG AMP: NEGATIVE
SPECIMEN DESCRIP: NORMAL
SPECIMEN DESCRIPTION: NORMAL

## 2018-05-21 RX ORDER — FERROUS SULFATE 325(65) MG
325 TABLET, DELAYED RELEASE (ENTERIC COATED) ORAL
COMMUNITY
End: 2018-09-05

## 2018-09-05 ENCOUNTER — OFFICE VISIT (OUTPATIENT)
Dept: FAMILY MEDICINE | Facility: OTHER | Age: 23
End: 2018-09-05
Attending: FAMILY MEDICINE
Payer: COMMERCIAL

## 2018-09-05 VITALS
HEART RATE: 74 BPM | RESPIRATION RATE: 16 BRPM | BODY MASS INDEX: 48.82 KG/M2 | SYSTOLIC BLOOD PRESSURE: 120 MMHG | TEMPERATURE: 99.2 F | HEIGHT: 65 IN | OXYGEN SATURATION: 96 % | WEIGHT: 293 LBS | DIASTOLIC BLOOD PRESSURE: 80 MMHG

## 2018-09-05 DIAGNOSIS — R10.11 ABDOMINAL PAIN, RIGHT UPPER QUADRANT: ICD-10-CM

## 2018-09-05 DIAGNOSIS — F40.10 SOCIAL ANXIETY DISORDER: ICD-10-CM

## 2018-09-05 DIAGNOSIS — K21.9 GASTROESOPHAGEAL REFLUX DISEASE, ESOPHAGITIS PRESENCE NOT SPECIFIED: ICD-10-CM

## 2018-09-05 DIAGNOSIS — R10.13 ABDOMINAL PAIN, EPIGASTRIC: Primary | ICD-10-CM

## 2018-09-05 DIAGNOSIS — F41.0 PANIC ATTACK: ICD-10-CM

## 2018-09-05 LAB
ALBUMIN SERPL-MCNC: 3.4 G/DL (ref 3.4–5)
ALP SERPL-CCNC: 84 U/L (ref 40–150)
ALT SERPL W P-5'-P-CCNC: 100 U/L (ref 0–50)
ANION GAP SERPL CALCULATED.3IONS-SCNC: 8 MMOL/L (ref 3–14)
AST SERPL W P-5'-P-CCNC: 69 U/L (ref 0–45)
BASOPHILS # BLD AUTO: 0 10E9/L (ref 0–0.2)
BASOPHILS NFR BLD AUTO: 0.4 %
BILIRUB DIRECT SERPL-MCNC: 0.1 MG/DL (ref 0–0.2)
BILIRUB SERPL-MCNC: 0.4 MG/DL (ref 0.2–1.3)
BUN SERPL-MCNC: 11 MG/DL (ref 7–30)
CALCIUM SERPL-MCNC: 8.5 MG/DL (ref 8.5–10.1)
CHLORIDE SERPL-SCNC: 105 MMOL/L (ref 94–109)
CO2 SERPL-SCNC: 25 MMOL/L (ref 20–32)
CREAT SERPL-MCNC: 0.55 MG/DL (ref 0.52–1.04)
DIFFERENTIAL METHOD BLD: NORMAL
EOSINOPHIL # BLD AUTO: 0.2 10E9/L (ref 0–0.7)
EOSINOPHIL NFR BLD AUTO: 2.4 %
ERYTHROCYTE [DISTWIDTH] IN BLOOD BY AUTOMATED COUNT: 13.4 % (ref 10–15)
GFR SERPL CREATININE-BSD FRML MDRD: >90 ML/MIN/1.7M2
GLUCOSE SERPL-MCNC: 95 MG/DL (ref 70–99)
HCT VFR BLD AUTO: 38.9 % (ref 35–47)
HGB BLD-MCNC: 12.8 G/DL (ref 11.7–15.7)
IMM GRANULOCYTES # BLD: 0 10E9/L (ref 0–0.4)
IMM GRANULOCYTES NFR BLD: 0.5 %
LYMPHOCYTES # BLD AUTO: 2.3 10E9/L (ref 0.8–5.3)
LYMPHOCYTES NFR BLD AUTO: 31 %
MCH RBC QN AUTO: 27.1 PG (ref 26.5–33)
MCHC RBC AUTO-ENTMCNC: 32.9 G/DL (ref 31.5–36.5)
MCV RBC AUTO: 82 FL (ref 78–100)
MONOCYTES # BLD AUTO: 0.5 10E9/L (ref 0–1.3)
MONOCYTES NFR BLD AUTO: 6.1 %
NEUTROPHILS # BLD AUTO: 4.4 10E9/L (ref 1.6–8.3)
NEUTROPHILS NFR BLD AUTO: 59.6 %
NRBC # BLD AUTO: 0 10*3/UL
NRBC BLD AUTO-RTO: 0 /100
PLATELET # BLD AUTO: 310 10E9/L (ref 150–450)
POTASSIUM SERPL-SCNC: 4.2 MMOL/L (ref 3.4–5.3)
PROT SERPL-MCNC: 8.2 G/DL (ref 6.8–8.8)
RBC # BLD AUTO: 4.73 10E12/L (ref 3.8–5.2)
SODIUM SERPL-SCNC: 138 MMOL/L (ref 133–144)
WBC # BLD AUTO: 7.4 10E9/L (ref 4–11)

## 2018-09-05 PROCEDURE — 85025 COMPLETE CBC W/AUTO DIFF WBC: CPT | Performed by: FAMILY MEDICINE

## 2018-09-05 PROCEDURE — 80048 BASIC METABOLIC PNL TOTAL CA: CPT | Performed by: FAMILY MEDICINE

## 2018-09-05 PROCEDURE — 36415 COLL VENOUS BLD VENIPUNCTURE: CPT | Performed by: FAMILY MEDICINE

## 2018-09-05 PROCEDURE — 99214 OFFICE O/P EST MOD 30 MIN: CPT | Performed by: FAMILY MEDICINE

## 2018-09-05 PROCEDURE — 80076 HEPATIC FUNCTION PANEL: CPT | Performed by: FAMILY MEDICINE

## 2018-09-05 RX ORDER — HYDROXYZINE PAMOATE 25 MG/1
25-50 CAPSULE ORAL 3 TIMES DAILY PRN
Qty: 60 CAPSULE | Refills: 1 | Status: SHIPPED | OUTPATIENT
Start: 2018-09-05 | End: 2021-06-17

## 2018-09-05 RX ORDER — SERTRALINE HYDROCHLORIDE 100 MG/1
100 TABLET, FILM COATED ORAL DAILY
Qty: 30 TABLET | Refills: 1 | Status: SHIPPED | OUTPATIENT
Start: 2018-09-05 | End: 2019-01-16

## 2018-09-05 ASSESSMENT — ANXIETY QUESTIONNAIRES
5. BEING SO RESTLESS THAT IT IS HARD TO SIT STILL: NEARLY EVERY DAY
1. FEELING NERVOUS, ANXIOUS, OR ON EDGE: NEARLY EVERY DAY
IF YOU CHECKED OFF ANY PROBLEMS ON THIS QUESTIONNAIRE, HOW DIFFICULT HAVE THESE PROBLEMS MADE IT FOR YOU TO DO YOUR WORK, TAKE CARE OF THINGS AT HOME, OR GET ALONG WITH OTHER PEOPLE: EXTREMELY DIFFICULT
6. BECOMING EASILY ANNOYED OR IRRITABLE: NEARLY EVERY DAY
GAD7 TOTAL SCORE: 21
2. NOT BEING ABLE TO STOP OR CONTROL WORRYING: NEARLY EVERY DAY
3. WORRYING TOO MUCH ABOUT DIFFERENT THINGS: NEARLY EVERY DAY
7. FEELING AFRAID AS IF SOMETHING AWFUL MIGHT HAPPEN: NEARLY EVERY DAY

## 2018-09-05 ASSESSMENT — PATIENT HEALTH QUESTIONNAIRE - PHQ9: 5. POOR APPETITE OR OVEREATING: NEARLY EVERY DAY

## 2018-09-05 ASSESSMENT — PAIN SCALES - GENERAL: PAINLEVEL: MODERATE PAIN (4)

## 2018-09-05 NOTE — PROGRESS NOTES
SUBJECTIVE:                                                    Karrie Oliveira is a 22 year old female who presents to clinic today for the following health issues:      Abdominal Pain      Duration: a few days    Description (location/character/radiation): only at night after dinner, 30 minutes later, pain goes from chest to abdomen and the sides of her rib cage area; radiates to back some       Associated flank pain: pt states she does have this    Intensity:  severe, 8/10    Accompanying signs and symptoms:        Fever/Chills: YES- chills       Gas/Bloating: YES- gas and bloating       Nausea/vomitting: no        Diarrhea: no        Dysuria or Hematuria: no     History (previous similar pain/trauma/previous testing): none    Precipitating or alleviating factors:       Pain worse with eating/BM/urination: yes-eating       Pain relieved by BM: no     Therapies tried and outcome: ibuprofen, but she states she usually falls asleep and its gone by morning so she is not sure if it is working    LMP:  not applicable    Some heartburn associated    Grandma paternal with gallbladder removal    Is on Omeprazole 40 mg and is not cutting it    Haven't taken H2 blocker      Depression and Anxiety Follow-Up    Status since last visit: Worsened -more panic attatcks    Other associated symptoms:abdominal pain    Complicating factors:     Significant life event: Yes-  Pt recently had her parents divorce      Current substance abuse: None    Vistaril does help    Zoloft x 1 year - no help, but side effects    PHQ-9 8/31/2016 6/29/2017 9/5/2018   Total Score 1 4 22   Q9: Suicide Ideation Not at all Not at all Several days     JUAN-7 SCORE 6/29/2017 9/5/2018   Total Score 8 21       PHQ-9  English  PHQ-9   Any Language  JUAN-7  Suicide Assessment Five-step Evaluation and Treatment (SAFE-T)    Problem list and histories reviewed & adjusted, as indicated.  Additional history: as documented    Current Outpatient Prescriptions  "  Medication     hydrOXYzine (VISTARIL) 25 MG capsule     ibuprofen 200 MG capsule     levonorgestrel (MIRENA) 20 MCG/24HR IUD     omeprazole (PRILOSEC) 40 MG capsule     ranitidine (ZANTAC) 150 MG tablet     sertraline (ZOLOFT) 100 MG tablet     [DISCONTINUED] sertraline (ZOLOFT) 50 MG tablet     No current facility-administered medications for this visit.        Patient Active Problem List   Diagnosis     Obesity     Esophageal reflux     ACP (advance care planning)     Prediabetes     Past Surgical History:   Procedure Laterality Date     MYRINGOTOMY, INSERT TUBE BILATERAL, COMBINED       TONSILLECTOMY         Social History   Substance Use Topics     Smoking status: Never Smoker     Smokeless tobacco: Never Used     Alcohol use No     Family History   Problem Relation Age of Onset     Thyroid Disease Mother      Diabetes Maternal Grandmother      Thyroid Disease Maternal Grandfather      Asthma No family hx of      Coronary Artery Disease No family hx of      Hypertension No family hx of            ROS:  Constitutional, HEENT, cardiovascular, pulmonary, gi and gu systems are negative, except as otherwise noted.    OBJECTIVE:     /80 (BP Location: Right arm, Patient Position: Sitting, Cuff Size: Adult Large)  Pulse 74  Temp 99.2  F (37.3  C) (Tympanic)  Resp 16  Ht 5' 5\" (1.651 m)  Wt (!) 416 lb (188.7 kg)  SpO2 96%  BMI 69.23 kg/m2  Body mass index is 69.23 kg/(m^2).  GENERAL: alert, no distress and obese  NECK: no adenopathy, no asymmetry, masses, or scars and thyroid normal to palpation  RESP: lungs clear to auscultation - no rales, rhonchi or wheezes  CV: regular rate and rhythm, normal S1 S2, no S3 or S4, no murmur, click or rub, no peripheral edema and peripheral pulses strong  ABDOMEN: no organomegaly or masses, bowel sounds normal and mild tenderness RUQ, but also epigastric and less so LUQ  MS: no gross musculoskeletal defects noted, no edema  PSYCH: mentation appears normal, affect " normal/bright      ASSESSMENT/PLAN:     (R10.13) Abdominal pain, epigastric  (primary encounter diagnosis)  Comment: possible biliary colic vs PUD/GERD vs other  Plan: CBC with platelets and differential, Basic         metabolic panel, Hepatic panel, US Abdomen         Complete, ranitidine (ZANTAC) 150 MG tablet         (F41.0) Panic attack  Plan: hydrOXYzine (VISTARIL) 25 MG capsule,         sertraline (ZOLOFT) 100 MG tablet, MENTAL         HEALTH REFERRAL  - Adult; Outpatient Treatment;        Individual/Couples/Family/Group Therapy/Health         Psychology; Range: Counseling Clinic [facility] [phone]; We will         contact you to schedule the appointment or         please call ...          (F40.10) Social anxiety disorder  Plan: hydrOXYzine (VISTARIL) 25 MG capsule,         sertraline (ZOLOFT) 100 MG tablet, MENTAL         HEALTH REFERRAL  - Adult; Outpatient Treatment;        Individual/Couples/Family/Group Therapy/Health         Psychology; Range: Counseling Clinic [facility] [phone]; We will         contact you to schedule the appointment or         please call ...    (R10.11) Abdominal pain, right upper quadrant  Plan: CBC with platelets and differential, Basic         metabolic panel, Hepatic panel, US Abdomen         Complete    (K21.9) Gastroesophageal reflux disease, esophagitis presence not specified  Plan: ranitidine (ZANTAC) 150 MG tablet    Patient Instructions   Omeprazole 40 mg max.  Add Zantac twice daily is needed.  Keep hydrated. [place] diet.    Will call with labs.  Ultrasound to be scheduled to evaluate gallbladder.  Consider HIDA scan to evaluate function of gallbladder if needed.    Increase Zoloft to 100 mg daily.  Vistaril refilled.  Follow up 1 month, sooner if concerns.  Referral for counseling - they will call.     Psychologists/ Counselors      [place] Harry   [phone]  Lev Alonso [phone]  [facility]   [phone]  [facility]  [phone]  [facility]      [phone]   [employer] (kids) [phone]  [employer](teens) [phone]  [facility]  [phone]  [facility]  [phone]  [facility]  [phone]  [facility]  [phone]  [facility]       [phone]   [facility]  [phone]  [facility] [phone]  [employer] [facility] [facility]  [phone]     [facility] [phone]      [facility] counseling [phone]  Ananth Mofrantz   [phone]  Jacob Martinez  [phone]  Karrie Cisse  [phone]  [facility] [phone]  [facility] Psych/ Health & Wellness      [phone]  [facility]       [phone]  [facility]  [phone]     [place]  Brad Schafer   [phone]  [facility]      [phone]  [facility] [address] JULIAN Ramos [phone]  [facility] Psychological Services      [phone]  [facility]  [phone]        *Facilities in bold italics indicate medication management  Services are offered.        Crisis Text Line  http://www.crisistextline.org       The Crisis Text Line serves anyone, in any type of crisis, providing access to free, 24/7 support and information via the medium people already use and trust:     Here's how it works:  Text HOME to 916-673 from anywhere in the [place], anytime, about any type of crisis.  A live, trained Crisis Counselor receives the text and responds quickly.  The volunteer Crisis Counselor will help you move from a 'hot moment to a cool moment'                              Jenae Griffith MD  [facility]

## 2018-09-05 NOTE — MR AVS SNAPSHOT
After Visit Summary   9/5/2018    Karrie Oliveira    MRN: [id number]           Patient Information     Date Of Birth          1995        Visit Information        Provider Department      9/5/2018 3:30 PM Jenae Hayward MD [facility]        Today's Diagnoses     Abdominal pain, epigastric    -  1    Panic attack        Social anxiety disorder        Abdominal pain, right upper quadrant        Gastroesophageal reflux disease, esophagitis presence not specified          Care Instructions    Omeprazole 40 mg max.  Add Zantac twice daily is needed.  Keep hydrated. [place] diet.    Will call with labs.  Ultrasound to be scheduled to evaluate gallbladder.  Consider HIDA scan to evaluate function of gallbladder if needed.    Increase Zoloft to 100 mg daily.  Vistaril refilled.  Follow up 1 month, sooner if concerns.  Referral for counseling - they will call.     Psychologists/ Counselors      [place]  [place]   [phone]  [facility] [phone]  [facility]   [phone]  [facility]  [phone]  [facility]      [phone]   [facility] [employer] (kids) [phone]  [facility] [employer](teens) [phone]  [facility]  [phone]  [facility]  [phone]  [facility]  [phone]  [facility]  [phone]  [facility]       [phone]   [facility]  [phone]  [facility] [phone]  [facility]  [phone]     [facility] [phone]      [facility] counseling [phone]  Ananth Lott   [phone]  Jacob Martinez  [phone]  Karrie Cisse  [phone]  [facility] [phone]  [facility]      [phone]  [facility]       [phone]  [facility]  [phone]     Nida Schafer   [phone]  [facility] [place]  [place]      [phone]  [facility] JULIAN Ramos [phone]  [facility]      [phone]  [facility]  [phone]        *Facilities in bold italics indicate medication management  Services are offered.        Crisis Text Line  http://www.crisistextline.org       The Crisis Text Line serves anyone, in any type of crisis, providing access to free, 24/7 support and information via the medium people already use and trust:     Here's how it works:  Text HOME to [phone] from anywhere in the [place], anytime, about any type of crisis.  A live, trained Crisis Counselor receives the text and responds quickly.  The volunteer Crisis Counselor will help you move from a 'hot moment to a cool moment'                            Follow-ups after your visit        Additional Services     MENTAL HEALTH REFERRAL  - Adult; Outpatient Treatment; Individual/Couples/Family/Group Therapy/Health Psychology; Range: Counseling Clinic   [facility] [place] [phone]; We will contact you to schedule the appointment or please call ...       All scheduling is subject to the client's specific insurance plan & benefits, provider/location availability, and provider clinical specialities.  Please arrive 15 minutes early for your first appointment and bring your completed paperwork.    Please be aware that coverage of these services is subject to the terms and limitations of your health insurance plan.  Call member services at your health plan with any benefit or coverage questions.                            Future tests that were ordered for you today     Open Future Orders        Priority Expected Expires Ordered    US Abdomen Complete Routine  9/5/2019 9/5/2018            Who to contact     If you have questions or need follow up information about today's clinic visit or your schedule please contact [facility] directly at [phone].  Normal or non-critical lab and imaging results  "will be communicated to you by MyChart, letter or phone within 4 business days after the clinic has received the results. If you do not hear from us within 7 days, please contact the clinic through MyChart or phone. If you have a critical or abnormal lab result, we will notify you by phone as soon as possible.  Submit refill requests through [facility] or call your pharmacy and they will forward the refill request to us. Please allow 3 business days for your refill to be completed.          Additional Information About Your Visit        Care EveryWhere ID     This is your Care EveryWhere ID. This could be used by other organizations to access your [place] medical records  [id number]        Your Vitals Were     Pulse Temperature Respirations Height Pulse Oximetry BMI (Body Mass Index)    74 99.2  F (37.3  C) (Tympanic) 16 5' 5\" (1.651 m) 96% 69.23 kg/m2       Blood Pressure from Last 3 Encounters:   09/05/18 120/80   06/29/17 116/86   06/16/17 115/95    Weight from Last 3 Encounters:   09/05/18 (!) 416 lb (188.7 kg)   06/29/17 (!) 402 lb (182.3 kg)   08/31/16 (!) 392 lb (177.8 kg)              We Performed the Following     Basic metabolic panel     CBC with platelets and differential     Hepatic panel     MENTAL HEALTH REFERRAL  - Adult; Outpatient Treatment; Individual/Couples/Family/Group Therapy/Health Psychology; Range: Counseling Clinic   [facility] [phone]; We will contact you to schedule the appointment or please call ...          Today's Medication Changes          These changes are accurate as of 9/5/18  4:23 PM.  If you have any questions, ask your nurse or doctor.               Start taking these medicines.        Dose/Directions    ranitidine 150 MG tablet   Commonly known as:  ZANTAC   Used for:  Abdominal pain, epigastric, Gastroesophageal reflux disease, esophagitis presence not specified   Started by:  Jenae Hayward MD        Dose:  150 mg   Take 1 tablet (150 mg) by " mouth 2 times daily   Quantity:  60 tablet   Refills:  1         These medicines have changed or have updated prescriptions.        Dose/Directions    sertraline 100 MG tablet   Commonly known as:  ZOLOFT   This may have changed:    - medication strength  - how much to take  - how to take this  - when to take this   Used for:  Social anxiety disorder, Panic attack   Changed by:  Jenae Hayward MD        Dose:  100 mg   Take 1 tablet (100 mg) by mouth daily Take 1/2 tablet (25 mg) for 1-2 weeks, then increase to 1 tablet orally daily   Quantity:  30 tablet   Refills:  1            Where to get your medicines      These medications were sent to [id number] - [place], [place] - [address]  [address] [address]     Phone:  [phone]     hydrOXYzine 25 MG capsule    ranitidine 150 MG tablet    sertraline 100 MG tablet                Primary Care Provider Office Phone # Fax #    Jenae Hayward [phone]       [address]  [place] [address]        Equal Access to Services     [facility]: Hadii aad ku hadasho Soomaali, waaxda luqadaha, qaybta kaalmada adeegyada, waxay idiin hayaan asuncion khfam valero . So [facility] [phone].    ATENCIÓN: Si habla español, tiene a mak disposición servicios gratuitos de asistencia lingüística. [facility] [phone].    We comply with applicable federal civil rights laws and [place] laws. We do not discriminate on the basis of race, color, national origin, age, disability, sex, sexual orientation, or gender identity.            Thank you!     Thank you for choosing [facility]  for your care. Our goal is always to provide you with excellent care. Hearing back from our patients is one way we can continue to improve our services. Please take a few minutes to complete the written survey that you may receive in the mail after your visit with us. Thank you!             Your Updated Medication List - Protect others  around you: Learn how to safely use, store and throw away your medicines at www.disposemymeds.org.          This list is accurate as of 9/5/18  4:23 PM.  Always use your most recent med list.                   Brand Name Dispense Instructions for use Diagnosis    hydrOXYzine 25 MG capsule    VISTARIL    60 capsule    Take 1-2 capsules (25-50 mg) by mouth 3 times daily as needed for anxiety or other (insomnia)    Panic attack, Social anxiety disorder       ibuprofen 200 MG capsule     120 capsule    Take 600 mg by mouth every 6 hours as needed        levonorgestrel 20 MCG/24HR IUD    MIRENA     1 each by Intrauterine route once        omeprazole 40 MG capsule    priLOSEC    90 capsule    Take 1 capsule (40 mg) by mouth daily    Gastroesophageal reflux disease, esophagitis presence not specified       ranitidine 150 MG tablet    ZANTAC    60 tablet    Take 1 tablet (150 mg) by mouth 2 times daily    Abdominal pain, epigastric, Gastroesophageal reflux disease, esophagitis presence not specified       sertraline 100 MG tablet    ZOLOFT    30 tablet    Take 1 tablet (100 mg) by mouth daily Take 1/2 tablet (25 mg) for 1-2 weeks, then increase to 1 tablet orally daily    Social anxiety disorder, Panic attack

## 2018-09-05 NOTE — PATIENT INSTRUCTIONS
Omeprazole 40 mg max.  Add Zantac twice daily is needed.  Keep hydrated. [place] diet.    Will call with labs.  Ultrasound to be scheduled to evaluate gallbladder.  Consider HIDA scan to evaluate function of gallbladder if needed.    Increase Zoloft to 100 mg daily.  Vistaril refilled.  Follow up 1 month, sooner if concerns.  Referral for counseling - they will call.     Psychologists/ Counselors      [place]   [phone]  [facility] [phone]  [facility]   [phone]  [facility]  [phone]  [facility]      [phone]   [facility] (kids) [phone]  [employer](teens) [phone]  [facility]  [phone]  [facility]  [phone]  [facility]  [phone]  [facility]  [phone]  [facility]       [phone]   [facility]  [phone]  [facility] [phone]  [facility]  [phone]     [facility] [phone]      [facility] counseling [phone]  [facility]   [phone]  Jacob Martinez  [phone]  Karrie Cisse  [phone]  [facility] [phone]  [facility]      [phone]  [facility]       [phone]  [facility]  [phone]     Nida Schafer   [phone]  [facility]      [phone]  [facility] Dr[facility] JULIAN Ramos [phone]  [facility]      [phone]  [facility]  [phone]        *Facilities in bold italics indicate medication management  Services are offered.        Crisis Text Line  http://www.crisistextline.org       The Crisis Text Line serves anyone, in any type of crisis, providing access to free, 24/7 support and information via the medium people already use and trust:     Here's how it works:  Text HOME to [phone] from anywhere in the [place],  anytime, about any type of crisis.  A live, trained Crisis Counselor receives the text and responds quickly.  The volunteer Crisis Counselor will help you move from a 'hot moment to a cool moment'

## 2018-09-05 NOTE — NURSING NOTE
"No chief complaint on file.      Initial /80 (BP Location: Right arm, Patient Position: Sitting, Cuff Size: Adult Large)  Pulse 74  Temp 99.2  F (37.3  C) (Tympanic)  Resp 16  Ht 5' 5\" (1.651 m)  Wt (!) 416 lb (188.7 kg)  SpO2 96%  BMI 69.23 kg/m2 Estimated body mass index is 69.23 kg/(m^2) as calculated from the following:    Height as of this encounter: 5' 5\" (1.651 m).    Weight as of this encounter: 416 lb (188.7 kg).  Medication Reconciliation: complete    aTnya Napier MA    "

## 2018-09-06 ENCOUNTER — TELEPHONE (OUTPATIENT)
Dept: FAMILY MEDICINE | Facility: OTHER | Age: 23
End: 2018-09-06

## 2018-09-06 ASSESSMENT — ANXIETY QUESTIONNAIRES: GAD7 TOTAL SCORE: 21

## 2018-09-06 ASSESSMENT — PATIENT HEALTH QUESTIONNAIRE - PHQ9: SUM OF ALL RESPONSES TO PHQ QUESTIONS 1-9: 22

## 2018-09-06 NOTE — TELEPHONE ENCOUNTER
Left patient a message in regards to getting this medication OTC.  Left contact information with any questions that may arise.

## 2018-09-06 NOTE — TELEPHONE ENCOUNTER
GAURAV - 09/06/2018 - received PA request from Tye Sanchez for ranitidine (zantac).  Attempted to submit thru [facility]MM.  Plan would not allow.  Called [employer] at [phone] and spoke with Batsheva.  She stated that patient's plan does not allow for a medication prior authorization for ranitidine/zantac.  The plan states that patient must use OTC.  Omeprazole is covered, but zantac cannot be prior authorized.  Pharmacy advised.  Documentation scanned to Epic.  Marilee Carrasco, HIS Specialist.

## 2018-09-07 ENCOUNTER — TELEPHONE (OUTPATIENT)
Dept: FAMILY MEDICINE | Facility: OTHER | Age: 23
End: 2018-09-07

## 2018-09-07 ENCOUNTER — HOSPITAL ENCOUNTER (OUTPATIENT)
Dept: ULTRASOUND IMAGING | Facility: HOSPITAL | Age: 23
Discharge: HOME | End: 2018-09-07
Attending: FAMILY MEDICINE | Admitting: FAMILY MEDICINE
Payer: COMMERCIAL

## 2018-09-07 DIAGNOSIS — R10.11 ABDOMINAL PAIN, RIGHT UPPER QUADRANT: ICD-10-CM

## 2018-09-07 DIAGNOSIS — R10.13 ABDOMINAL PAIN, EPIGASTRIC: Primary | ICD-10-CM

## 2018-09-07 DIAGNOSIS — K21.9 GASTROESOPHAGEAL REFLUX DISEASE, ESOPHAGITIS PRESENCE NOT SPECIFIED: ICD-10-CM

## 2018-09-07 DIAGNOSIS — R10.13 ABDOMINAL PAIN, EPIGASTRIC: ICD-10-CM

## 2018-09-07 PROCEDURE — 76705 ECHO EXAM OF ABDOMEN: CPT | Mod: TC

## 2018-09-10 RX ORDER — OMEPRAZOLE 40 MG/1
CAPSULE, DELAYED RELEASE ORAL
Qty: 30 CAPSULE | Refills: 5 | Status: SHIPPED | OUTPATIENT
Start: 2018-09-10 | End: 2019-07-18

## 2018-09-11 ENCOUNTER — HOSPITAL ENCOUNTER (OUTPATIENT)
Dept: NUCLEAR MEDICINE | Facility: HOSPITAL | Age: 23
End: 2018-09-11
Attending: FAMILY MEDICINE
Payer: COMMERCIAL

## 2018-09-11 ENCOUNTER — HOSPITAL ENCOUNTER (OUTPATIENT)
Dept: NUCLEAR MEDICINE | Facility: HOSPITAL | Age: 23
Discharge: HOME | End: 2018-09-11
Attending: FAMILY MEDICINE | Admitting: FAMILY MEDICINE
Payer: COMMERCIAL

## 2018-09-11 DIAGNOSIS — R10.13 ABDOMINAL PAIN, EPIGASTRIC: ICD-10-CM

## 2018-09-11 PROCEDURE — 78227 HEPATOBIL SYST IMAGE W/DRUG: CPT | Mod: TC

## 2018-09-11 PROCEDURE — 34300033 ZZH RX 343: Performed by: RADIOLOGY

## 2018-09-11 PROCEDURE — A9537 TC99M MEBROFENIN: HCPCS | Performed by: RADIOLOGY

## 2018-09-11 RX ORDER — KIT FOR THE PREPARATION OF TECHNETIUM TC 99M MEBROFENIN 45 MG/10ML
5 INJECTION, POWDER, LYOPHILIZED, FOR SOLUTION INTRAVENOUS ONCE
Status: COMPLETED | OUTPATIENT
Start: 2018-09-11 | End: 2018-09-11

## 2018-09-11 RX ADMIN — MEBROFENIN 5 MILLICURIE: 45 INJECTION, POWDER, LYOPHILIZED, FOR SOLUTION INTRAVENOUS at 12:09

## 2018-09-21 ENCOUNTER — OFFICE VISIT (OUTPATIENT)
Dept: BEHAVIORAL HEALTH | Facility: OTHER | Age: 23
End: 2018-09-21
Payer: COMMERCIAL

## 2018-09-21 ENCOUNTER — OFFICE VISIT (OUTPATIENT)
Dept: FAMILY MEDICINE | Facility: OTHER | Age: 23
End: 2018-09-21
Attending: FAMILY MEDICINE
Payer: COMMERCIAL

## 2018-09-21 VITALS
OXYGEN SATURATION: 97 % | SYSTOLIC BLOOD PRESSURE: 124 MMHG | HEIGHT: 65 IN | HEART RATE: 82 BPM | RESPIRATION RATE: 16 BRPM | DIASTOLIC BLOOD PRESSURE: 80 MMHG | TEMPERATURE: 98.3 F | BODY MASS INDEX: 48.82 KG/M2 | WEIGHT: 293 LBS

## 2018-09-21 DIAGNOSIS — R74.8 ELEVATED LIVER ENZYMES: ICD-10-CM

## 2018-09-21 DIAGNOSIS — R69 DIAGNOSIS DEFERRED: Primary | ICD-10-CM

## 2018-09-21 DIAGNOSIS — R14.2 FLATULENCE, ERUCTATION, AND GAS PAIN: ICD-10-CM

## 2018-09-21 DIAGNOSIS — K21.9 GASTROESOPHAGEAL REFLUX DISEASE, ESOPHAGITIS PRESENCE NOT SPECIFIED: ICD-10-CM

## 2018-09-21 DIAGNOSIS — K76.0 FATTY LIVER: ICD-10-CM

## 2018-09-21 DIAGNOSIS — F41.9 ANXIETY: Primary | ICD-10-CM

## 2018-09-21 DIAGNOSIS — R14.1 FLATULENCE, ERUCTATION, AND GAS PAIN: ICD-10-CM

## 2018-09-21 DIAGNOSIS — E66.01 MORBID OBESITY (H): ICD-10-CM

## 2018-09-21 DIAGNOSIS — R14.3 FLATULENCE, ERUCTATION, AND GAS PAIN: ICD-10-CM

## 2018-09-21 DIAGNOSIS — R10.10 UPPER ABDOMINAL PAIN: ICD-10-CM

## 2018-09-21 PROBLEM — E28.2 PCO (POLYCYSTIC OVARIES): Status: ACTIVE | Noted: 2018-01-30

## 2018-09-21 PROBLEM — N93.8 DUB (DYSFUNCTIONAL UTERINE BLEEDING): Status: ACTIVE | Noted: 2018-01-30

## 2018-09-21 LAB
ALT SERPL W P-5'-P-CCNC: 94 U/L (ref 0–50)
AMYLASE SERPL-CCNC: 69 U/L (ref 30–110)
AST SERPL W P-5'-P-CCNC: 49 U/L (ref 0–45)
LIPASE SERPL-CCNC: 85 U/L (ref 73–393)

## 2018-09-21 PROCEDURE — 82150 ASSAY OF AMYLASE: CPT | Performed by: FAMILY MEDICINE

## 2018-09-21 PROCEDURE — 83690 ASSAY OF LIPASE: CPT | Performed by: FAMILY MEDICINE

## 2018-09-21 PROCEDURE — 36415 COLL VENOUS BLD VENIPUNCTURE: CPT | Performed by: FAMILY MEDICINE

## 2018-09-21 PROCEDURE — 99214 OFFICE O/P EST MOD 30 MIN: CPT | Performed by: FAMILY MEDICINE

## 2018-09-21 PROCEDURE — 84450 TRANSFERASE (AST) (SGOT): CPT | Performed by: FAMILY MEDICINE

## 2018-09-21 PROCEDURE — 84460 ALANINE AMINO (ALT) (SGPT): CPT | Performed by: FAMILY MEDICINE

## 2018-09-21 ASSESSMENT — ANXIETY QUESTIONNAIRES
GAD7 TOTAL SCORE: 21
3. WORRYING TOO MUCH ABOUT DIFFERENT THINGS: NEARLY EVERY DAY
1. FEELING NERVOUS, ANXIOUS, OR ON EDGE: NEARLY EVERY DAY
4. TROUBLE RELAXING: NEARLY EVERY DAY
6. BECOMING EASILY ANNOYED OR IRRITABLE: NEARLY EVERY DAY
2. NOT BEING ABLE TO STOP OR CONTROL WORRYING: NEARLY EVERY DAY
7. FEELING AFRAID AS IF SOMETHING AWFUL MIGHT HAPPEN: NEARLY EVERY DAY
5. BEING SO RESTLESS THAT IT IS HARD TO SIT STILL: NEARLY EVERY DAY
IF YOU CHECKED OFF ANY PROBLEMS ON THIS QUESTIONNAIRE, HOW DIFFICULT HAVE THESE PROBLEMS MADE IT FOR YOU TO DO YOUR WORK, TAKE CARE OF THINGS AT HOME, OR GET ALONG WITH OTHER PEOPLE: EXTREMELY DIFFICULT

## 2018-09-21 ASSESSMENT — PAIN SCALES - GENERAL: PAINLEVEL: SEVERE PAIN (7)

## 2018-09-21 NOTE — PROGRESS NOTES
KARL CC introduced and explained integrated health model, brief therapy interventions and referral/ support services for ongoing therapy interventions.  Discussed [facility] services, patient was interested.  Presenting issue: anxiety     Scheduled patient for therapy next week, will be present per patient's request.    September 21, 2018 EVELIN Estrella

## 2018-09-21 NOTE — PROGRESS NOTES
SUBJECTIVE:   Karrie Oliveira is a 22 year old female who presents to clinic today for the following health issues:      Anxiety Follow-Up    Status since last visit: No change    Other associated symptoms:None    Complicating factors:   Significant life event: No   Current substance abuse: None  Depression symptoms: YES  Zoloft increased to 100 mg 9/5/18.  No side effects, but no benefit yet.  Vistaril continued - has not had to use.  Counseling referral done as well.  Has not followed through with is.  Meeting new people is what has prevented her from trying this.    JUAN-7 SCORE 6/29/2017 9/5/2018   Total Score 8 21       JUAN-7    Amount of exercise or physical activity: None    Problems taking medications regularly: No    Medication side effects: none    Diet: regular (no restrictions)        Abdominal Pain      Duration: several weeks    Description (location/character/radiation): bloating, pain, cramping       Associated flank pain: None    Intensity:  moderate    Accompanying signs and symptoms:        Fever/Chills: no        Gas/Bloating: YES       Nausea/vomitting: no        Diarrhea: no        Dysuria or Hematuria: no     History (previous similar pain/trauma/previous testing): see for same issue 9/5/18    Precipitating or alleviating factors:       Pain worse with eating/BM/urination: no       Pain relieved by BM: no     Therapies tried and outcome: Zantac, Prilosec;    LMP:  not applicable    Ultrasound and HIDA scan negative    Pain is only upper - no lower pain or cramping    Is wondering about her elevated liver enzymes      Problem list and histories reviewed & adjusted, as indicated.  Additional history: as documented    Current Outpatient Prescriptions   Medication     hydrOXYzine (VISTARIL) 25 MG capsule     ibuprofen 200 MG capsule     levonorgestrel (MIRENA) 20 MCG/24HR IUD     omeprazole (PRILOSEC) 40 MG capsule     ranitidine (ZANTAC) 150 MG tablet     sertraline (ZOLOFT) 100 MG tablet  "    No current facility-administered medications for this visit.        Patient Active Problem List   Diagnosis     Obesity     Esophageal reflux     ACP (advance care planning)     Prediabetes     DUB (dysfunctional uterine bleeding)     PCO (polycystic ovaries)     Morbid obesity (H)     Anxiety     Fatty liver     Past Surgical History:   Procedure Laterality Date     MYRINGOTOMY, INSERT TUBE BILATERAL, COMBINED       TONSILLECTOMY         Social History   Substance Use Topics     Smoking status: Never Smoker     Smokeless tobacco: Never Used     Alcohol use No     Family History   Problem Relation Age of Onset     Thyroid Disease Mother      Diabetes Maternal Grandmother      Thyroid Disease Maternal Grandfather      Asthma No family hx of      Coronary Artery Disease No family hx of      Hypertension No family hx of            Reviewed and updated as needed this visit by clinical staff       Reviewed and updated as needed this visit by Provider         ROS:  Constitutional, HEENT, cardiovascular, pulmonary, gi and gu systems are negative, except as otherwise noted.    OBJECTIVE:     /80  Pulse 82  Temp 98.3  F (36.8  C) (Tympanic)  Resp 16  Ht 5' 5\" (1.651 m)  Wt (!) 416 lb (188.7 kg)  SpO2 97%  BMI 69.23 kg/m2  Body mass index is 69.23 kg/(m^2).  GENERAL: alert, no distress and morbidly obese  NECK: no adenopathy, no asymmetry, masses, or scars and thyroid normal to palpation  RESP: lungs clear to auscultation - no rales, rhonchi or wheezes  CV: regular rate and rhythm, normal S1 S2, no S3 or S4, no murmur, click or rub, no peripheral edema and peripheral pulses strong  ABDOMEN: bowel sounds normal, no palpable or pulsatile masses and soft; tender across upper abdominal; no guarding; non-tender lower abdomen  MS: no gross musculoskeletal defects noted, no edema  SKIN: no suspicious lesions or rashes  PSYCH: mentation appears normal and affect flat    Diagnostic Test Results:  none "     ASSESSMENT/PLAN:     (F41.9) Anxiety  (primary encounter diagnosis)  Comment: poor control; needs to give new dose time; needs to start counseling  Plan: MENTAL HEALTH REFERRAL  - Adult; Outpatient         Treatment; Behavioral Health Home; Range:         [facility] [phone]; We will contact         you to schedule the appointment or please call         with any questions            (E66.01) Morbid obesity (H)    (R10.10) Upper abdominal pain  Comment: PUD/gastritis vs pancreatic vs gas/bloating/IBS vs other; recent normal U/S and HIDA.  Consider CT, but low yield likely; possible H Pyloir - on PPI now  Plan: AST, ALT, Lipase, Amylase, GENERAL SURG ADULT         REFERRAL            (K76.0) Fatty liver  Comment: likely cause of elevated enzymes  Plan: weight loss    (R74.8) Elevated liver enzymes  Plan: AST, ALT    (R14.3,  R14.1,  R14.2) Flatulence, eructation, and gas pain    (K21.9) Gastroesophageal reflux disease, esophagitis presence not specified  Plan: GENERAL SURG ADULT REFERRAL    Patient Instructions   Repeat liver enzymes today, along with pancreatic enzymes.  Referral to general surgery for possible upper endoscopy.  Continue Zoloft 100 mg - would given another couple weeks before considering increase to 150 mg.  Start counseling - Behavioral Health Home referral placed - you met with Juanita today.  Follow up 1 months.              Jenae Griffith MD  [facility] [place]

## 2018-09-21 NOTE — PATIENT INSTRUCTIONS
Repeat liver enzymes today, along with pancreatic enzymes.  Referral to general surgery for possible upper endoscopy.  Continue Zoloft 100 mg - would given another couple weeks before considering increase to 150 mg.  Start counseling - Behavioral Health Home referral placed - you met with Juanita today.  Follow up 1 months.

## 2018-09-21 NOTE — NURSING NOTE
"Chief Complaint   Patient presents with     Anxiety       Initial /80  Pulse 82  Temp 98.3  F (36.8  C) (Tympanic)  Resp 16  Ht 5' 5\" (1.651 m)  Wt (!) 416 lb (188.7 kg)  SpO2 97%  BMI 69.23 kg/m2 Estimated body mass index is 69.23 kg/(m^2) as calculated from the following:    Height as of this encounter: 5' 5\" (1.651 m).    Weight as of this encounter: 416 lb (188.7 kg).  Medication Reconciliation: complete    Prisca Dowell LPN  "

## 2018-09-21 NOTE — MR AVS SNAPSHOT
After Visit Summary   9/21/2018    Karrie Oliveira    MRN: [id number]           Patient Information     Date Of Birth          1995        Visit Information        Provider Department      9/21/2018 10:30 AM Jenae Hayward MD [facility]        Today's Diagnoses     Anxiety    -  1    Morbid obesity (H)        Upper abdominal pain        Fatty liver        Elevated liver enzymes        Flatulence, eructation, and gas pain        Gastroesophageal reflux disease, esophagitis presence not specified          Care Instructions    Repeat liver enzymes today, along with pancreatic enzymes.  Referral to general surgery for possible upper endoscopy.  Continue Zoloft 100 mg - would given another couple weeks before considering increase to 150 mg.  Start counseling - Behavioral Health Home referral placed - you met with Juanita today.  Follow up 1 months.            Follow-ups after your visit        Additional Services     GENERAL SURG ADULT REFERRAL       Your provider has referred you to: [facility] [phone]  [email or web address]    Please be aware that coverage of these services is subject to the terms and limitations of your health insurance plan.  Call member services at your health plan with any benefit or coverage questions.      Please bring the following with you to your appointment:    (1) Any X-Rays, CTs or MRIs which have been performed.  Contact the facility where they were done to arrange for  prior to your scheduled appointment.   (2) List of current medications   (3) This referral request   (4) Any documents/labs given to you for this referral            MENTAL HEALTH REFERRAL  - Adult; Outpatient Treatment; Behavioral Health Home; Range: [facility] [phone]; We will contact you to schedule the appointment or please call with any questions       All scheduling is subject to the client's  "specific insurance plan & benefits, provider/location availability, and provider clinical specialities.  Please arrive 15 minutes early for your first appointment and bring your completed paperwork.    Please be aware that coverage of these services is subject to the terms and limitations of your health insurance plan.  Call member services at your health plan with any benefit or coverage questions.                            Who to contact     If you have questions or need follow up information about today's clinic visit or your schedule please contact [facility] directly at [phone].  Normal or non-critical lab and imaging results will be communicated to you by MyChart, letter or phone within 4 business days after the clinic has received the results. If you do not hear from us within 7 days, please contact the clinic through MyChart or phone. If you have a critical or abnormal lab result, we will notify you by phone as soon as possible.  Submit refill requests through [employer] or call your pharmacy and they will forward the refill request to us. Please allow 3 business days for your refill to be completed.          Additional Information About Your Visit        Care EveryWhere ID     This is your Care EveryWhere ID. This could be used by other organizations to access your [place] medical records  [id number]        Your Vitals Were     Pulse Temperature Respirations Height Pulse Oximetry BMI (Body Mass Index)    82 98.3  F (36.8  C) (Tympanic) 16 5' 5\" (1.651 m) 97% 69.23 kg/m2       Blood Pressure from Last 3 Encounters:   09/21/18 124/80   09/05/18 120/80   06/29/17 116/86    Weight from Last 3 Encounters:   09/21/18 (!) 416 lb (188.7 kg)   09/05/18 (!) 416 lb (188.7 kg)   06/29/17 (!) 402 lb (182.3 kg)              We Performed the Following     ALT     Amylase     AST     GENERAL SURG ADULT REFERRAL     Lipase     MENTAL HEALTH REFERRAL  - Adult; Outpatient Treatment; Behavioral " Health Home; Range: [facility] [phone]; We will contact you to schedule the appointment or please call with any questions        Primary Care Provider Office Phone # Fax #    Jenae Hayward [phone] [address] [place] [address]        Equal Access to Services     [facility]: Hadii aad ku hadasho Soomaali, waaxda luqadaha, qaybta kaalmada adeegyada, jet king hayvishnun adejazmín tingradha valero . So [facility] [phone].    ATENCIÓN: Si habla español, tiene a mak disposición servicios gratuitos de asistencia lingüística. Llame al [phone].    We comply with applicable federal civil rights laws and [place] laws. We do not discriminate on the basis of race, color, national origin, age, disability, sex, sexual orientation, or gender identity.            Thank you!     Thank you for choosing [facility]  for your care. Our goal is always to provide you with excellent care. Hearing back from our patients is one way we can continue to improve our services. Please take a few minutes to complete the written survey that you may receive in the mail after your visit with us. Thank you!             Your Updated Medication List - Protect others around you: Learn how to safely use, store and throw away your medicines at www.disposemymeds.org.          This list is accurate as of 9/21/18 10:55 AM.  Always use your most recent med list.                   Brand Name Dispense Instructions for use Diagnosis    hydrOXYzine 25 MG capsule    VISTARIL    60 capsule    Take 1-2 capsules (25-50 mg) by mouth 3 times daily as needed for anxiety or other (insomnia)    Panic attack, Social anxiety disorder       ibuprofen 200 MG capsule     120 capsule    Take 600 mg by mouth every 6 hours as needed        levonorgestrel 20 MCG/24HR IUD    MIRENA     1 each by Intrauterine route once        omeprazole 40 MG capsule    priLOSEC    30 capsule    TAKE 1 CAPSULE BY MOUTH EVERY DAY     Gastroesophageal reflux disease, esophagitis presence not specified       ranitidine 150 MG tablet    ZANTAC    60 tablet    Take 1 tablet (150 mg) by mouth 2 times daily    Abdominal pain, epigastric, Gastroesophageal reflux disease, esophagitis presence not specified       sertraline 100 MG tablet    ZOLOFT    30 tablet    Take 1 tablet (100 mg) by mouth daily Take 1/2 tablet (25 mg) for 1-2 weeks, then increase to 1 tablet orally daily    Social anxiety disorder, Panic attack

## 2018-09-21 NOTE — MR AVS SNAPSHOT
After Visit Summary   9/21/2018    Karrie Oliveira    MRN: [id number]           Patient Information     Date Of Birth          1995        Visit Information        Provider Department      9/21/2018 11:30 AM Gisele Bowser LSW [facility]        Today's Diagnoses     Diagnosis deferred    -  1       Follow-ups after your visit        Your next 10 appointments already scheduled     Sep 26, 2018  1:00 PM CDT   (Arrive by 12:45 PM)   New Visit with Hiral Fierro [facility] [place] ([facility] )    [address]  [place] [address] [phone]            Sep 26, 2018  1:00 PM CDT   (Arrive by 12:45 PM)   [facility]    [address]  [place] [address] [phone]              Who to contact     If you have questions or need follow up information about today's clinic visit or your schedule please contact [facility] directly at [phone].  Normal or non-critical lab and imaging results will be communicated to you by MyChart, letter or phone within 4 business days after the clinic has received the results. If you do not hear from us within 7 days, please contact the clinic through MyChart or phone. If you have a critical or abnormal lab result, we will notify you by phone as soon as possible.  Submit refill requests through [employer] or call your pharmacy and they will forward the refill request to us. Please allow 3 business days for your refill to be completed.          Additional Information About Your Visit        Care EveryWhere ID     This is your Care EveryWhere ID. This could be used by other organizations to access your [place] medical records  [id number]         Blood Pressure from Last 3 Encounters:   09/21/18 124/80   09/05/18 120/80   06/29/17 116/86    Weight  from Last 3 Encounters:   09/21/18 (!) 416 lb (188.7 kg)   09/05/18 (!) 416 lb (188.7 kg)   06/29/17 (!) 402 lb (182.3 kg)              Today, you had the following     No orders found for display       Primary Care Provider Office Phone # Fax #    Jenae Hayward [phone]       [address]  [facility] [address]        Equal Access to Services     [facility] : Hadii aad ku hadasho Soomaali, waaxda luqadaha, qaybta kaalmada adeegyada, waxay idiin hayaan adeeg kharash la'frank . So [facility] [phone].    ATENCIÓN: Si habla español, tiene a mak disposición servicios gratuitos de asistencia lingüística. [facility] [phone].    We comply with applicable federal civil rights laws and [place] laws. We do not discriminate on the basis of race, color, national origin, age, disability, sex, sexual orientation, or gender identity.            Thank you!     Thank you for choosing [facility]  for your care. Our goal is always to provide you with excellent care. Hearing back from our patients is one way we can continue to improve our services. Please take a few minutes to complete the written survey that you may receive in the mail after your visit with us. Thank you!             Your Updated Medication List - Protect others around you: Learn how to safely use, store and throw away your medicines at www.disposemymeds.org.          This list is accurate as of 9/21/18 12:02 PM.  Always use your most recent med list.                   Brand Name Dispense Instructions for use Diagnosis    hydrOXYzine 25 MG capsule    VISTARIL    60 capsule    Take 1-2 capsules (25-50 mg) by mouth 3 times daily as needed for anxiety or other (insomnia)    Panic attack, Social anxiety disorder       ibuprofen 200 MG capsule     120 capsule    Take 600 mg by mouth every 6 hours as needed        levonorgestrel 20 MCG/24HR IUD    MIRENA     1 each by Intrauterine route once        omeprazole 40 MG  capsule    priLOSEC    30 capsule    TAKE 1 CAPSULE BY MOUTH EVERY DAY    Gastroesophageal reflux disease, esophagitis presence not specified       ranitidine 150 MG tablet    ZANTAC    60 tablet    Take 1 tablet (150 mg) by mouth 2 times daily    Abdominal pain, epigastric, Gastroesophageal reflux disease, esophagitis presence not specified       sertraline 100 MG tablet    ZOLOFT    30 tablet    Take 1 tablet (100 mg) by mouth daily Take 1/2 tablet (25 mg) for 1-2 weeks, then increase to 1 tablet orally daily    Social anxiety disorder, Panic attack

## 2018-09-22 ASSESSMENT — ANXIETY QUESTIONNAIRES: GAD7 TOTAL SCORE: 21

## 2018-09-22 ASSESSMENT — PATIENT HEALTH QUESTIONNAIRE - PHQ9: SUM OF ALL RESPONSES TO PHQ QUESTIONS 1-9: 21

## 2018-09-26 ENCOUNTER — OFFICE VISIT (OUTPATIENT)
Dept: BEHAVIORAL HEALTH | Facility: OTHER | Age: 23
End: 2018-09-26
Attending: SOCIAL WORKER
Payer: COMMERCIAL

## 2018-09-26 ENCOUNTER — OFFICE VISIT (OUTPATIENT)
Dept: BEHAVIORAL HEALTH | Facility: OTHER | Age: 23
End: 2018-09-26
Payer: COMMERCIAL

## 2018-09-26 DIAGNOSIS — F40.10 SOCIAL ANXIETY DISORDER: ICD-10-CM

## 2018-09-26 DIAGNOSIS — F33.0 MAJOR DEPRESSIVE DISORDER, RECURRENT EPISODE, MILD (H): ICD-10-CM

## 2018-09-26 DIAGNOSIS — F41.1 GAD (GENERALIZED ANXIETY DISORDER): ICD-10-CM

## 2018-09-26 DIAGNOSIS — R69 DIAGNOSIS DEFERRED: Primary | ICD-10-CM

## 2018-09-26 DIAGNOSIS — F41.0 PANIC ATTACK: ICD-10-CM

## 2018-09-26 PROCEDURE — 90791 PSYCH DIAGNOSTIC EVALUATION: CPT | Performed by: SOCIAL WORKER

## 2018-09-26 ASSESSMENT — ANXIETY QUESTIONNAIRES
2. NOT BEING ABLE TO STOP OR CONTROL WORRYING: NEARLY EVERY DAY
IF YOU CHECKED OFF ANY PROBLEMS ON THIS QUESTIONNAIRE, HOW DIFFICULT HAVE THESE PROBLEMS MADE IT FOR YOU TO DO YOUR WORK, TAKE CARE OF THINGS AT HOME, OR GET ALONG WITH OTHER PEOPLE: VERY DIFFICULT
GAD7 TOTAL SCORE: 20
7. FEELING AFRAID AS IF SOMETHING AWFUL MIGHT HAPPEN: NEARLY EVERY DAY
3. WORRYING TOO MUCH ABOUT DIFFERENT THINGS: NEARLY EVERY DAY
6. BECOMING EASILY ANNOYED OR IRRITABLE: NEARLY EVERY DAY
5. BEING SO RESTLESS THAT IT IS HARD TO SIT STILL: MORE THAN HALF THE DAYS
1. FEELING NERVOUS, ANXIOUS, OR ON EDGE: NEARLY EVERY DAY

## 2018-09-26 ASSESSMENT — PATIENT HEALTH QUESTIONNAIRE - PHQ9: 5. POOR APPETITE OR OVEREATING: NEARLY EVERY DAY
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
## 2018-09-26 NOTE — MR AVS SNAPSHOT
After Visit Summary   9/26/2018    Karrie Oliveira    MRN: [id number]           Patient Information     Date Of Birth          1995        Visit Information        Provider Department      9/26/2018 1:00 PM [facility]        Today's Diagnoses     Diagnosis deferred    -  1       Follow-ups after your visit        Your next 10 appointments already scheduled     Oct 02, 2018  3:00 PM CDT   (Arrive by 2:45 PM)   New Visit with Todd Bhatt MD   [facility] [place] ([facility] [place] [facility]    [address] [place] [address]  [place] [address]   [phone]            Oct 04, 2018  1:00 PM CDT   (Arrive by 12:45 PM)   Return Visit with Hiral Fierro [facility] [place] ([facility] [place] )    [address]  [place] [address] [phone]            Oct 11, 2018  1:00 PM CDT   (Arrive by 12:45 PM)   [facility]   [facility] [place] ([facility] [place] )    [address]  [place] [address] [phone]            Oct 18, 2018  1:00 PM CDT   (Arrive by 12:45 PM)   [facility]   [facility] [place] ([facility] [place] )    [address]  [place] [address] [phone]            Oct 25, 2018  1:00 PM CDT   (Arrive by 12:45 PM)   Return Visit with Hiral Fierro [facility] [place] ([facility] [place] )    [address]  [place] [address] [phone]              Who to contact     If you have questions or need follow up information about today's clinic visit or your schedule please contact [facility] directly at [phone].  Normal or non-critical lab and imaging results will be communicated to you by MyChart, letter or phone within 4 business days after the clinic has  received the results. If you do not hear from us within 7 days, please contact the clinic through [employer] or phone. If you have a critical or abnormal lab result, we will notify you by phone as soon as possible.  Submit refill requests through [employer] or call your pharmacy and they will forward the refill request to us. Please allow 3 business days for your refill to be completed.          Additional Information About Your Visit        Care EveryWhere ID     This is your Care EveryWhere ID. This could be used by other organizations to access your [place] medical records  [id number]         Blood Pressure from Last 3 Encounters:   09/21/18 124/80   09/05/18 120/80   06/29/17 116/86    Weight from Last 3 Encounters:   09/21/18 (!) 416 lb (188.7 kg)   09/05/18 (!) 416 lb (188.7 kg)   06/29/17 (!) 402 lb (182.3 kg)              Today, you had the following     No orders found for display       Primary Care Provider Office Phone # Fax #    Jenae Hayward [phone] [address] [facility] [address] [facility] [address]        Equal Access to Services     [facility]: Hadii aad ku hadasho Soomaali, waaxda luqadaha, qaybta kaalmada adeegyada, jet valero . So [facility] [phone].    ATENCIÓN: Si habla español, tiene a mak disposición servicios gratuitos de asistencia lingüística. [facility] [phone].    We comply with applicable federal civil rights laws and [place] laws. We do not discriminate on the basis of race, color, national origin, age, disability, sex, sexual orientation, or gender identity.            Thank you!     Thank you for choosing [facility]  for your care. Our goal is always to provide you with excellent care. Hearing back from our patients is one way we can continue to improve our services. Please take a few minutes to complete the written survey that you may receive in the mail after your visit with us. Thank you!             Your  Updated Medication List - Protect others around you: Learn how to safely use, store and throw away your medicines at www.disposemymeds.org.          This list is accurate as of 9/26/18  3:22 PM.  Always use your most recent med list.                   Brand Name Dispense Instructions for use Diagnosis    hydrOXYzine 25 MG capsule    VISTARIL    60 capsule    Take 1-2 capsules (25-50 mg) by mouth 3 times daily as needed for anxiety or other (insomnia)    Panic attack, Social anxiety disorder       ibuprofen 200 MG capsule     120 capsule    Take 600 mg by mouth every 6 hours as needed        levonorgestrel 20 MCG/24HR IUD    MIRENA     1 each by Intrauterine route once        omeprazole 40 MG capsule    priLOSEC    30 capsule    TAKE 1 CAPSULE BY MOUTH EVERY DAY    Gastroesophageal reflux disease, esophagitis presence not specified       ranitidine 150 MG tablet    ZANTAC    60 tablet    Take 1 tablet (150 mg) by mouth 2 times daily    Abdominal pain, epigastric, Gastroesophageal reflux disease, esophagitis presence not specified       sertraline 100 MG tablet    ZOLOFT    30 tablet    Take 1 tablet (100 mg) by mouth daily Take 1/2 tablet (25 mg) for 1-2 weeks, then increase to 1 tablet orally daily    Social anxiety disorder, Panic attack

## 2018-09-26 NOTE — MR AVS SNAPSHOT
After Visit Summary   9/26/2018    Karrie Oliveira    MRN: [id number]           Patient Information     Date Of Birth          1995        Visit Information        Provider Department      9/26/2018 1:00 PM [facility]        Today's Diagnoses     JUAN (generalized anxiety disorder)        Panic attack        Social anxiety disorder        Major depressive disorder, recurrent episode, mild (H)           Follow-ups after your visit        Your next 10 appointments already scheduled     Oct 02, 2018  3:00 PM CDT   (Arrive by 2:45 PM)   New Visit with Todd Bhatt MD   [facility] [place] ([facility] [place] )    [address]  [place] [address]   [phone]            Oct 04, 2018  1:00 PM CDT   (Arrive by 12:45 PM)   [facility]   [facility] [place] ([facility] [place] )    [address]  [place] [address] [phone]            Oct 11, 2018  1:00 PM CDT   (Arrive by 12:45 PM)   Return Visit with HUYEN Siddiqui   [facility] [place] ([facility] [place] )    [address]  [place] [address] [phone]            Oct 18, 2018  1:00 PM CDT   (Arrive by 12:45 PM)   [facility]   [facility] [place] ([facility] [place] )    [address]  [place] [id number] [phone]            Oct 25, 2018  1:00 PM CDT   (Arrive by 12:45 PM)   [facility]   [facility] [place] ([facility] [place] )    [address] [facility] [place] [address] [phone]              Who to contact     If you have questions or need follow up information about today's clinic visit or your schedule please contact [facility] directly at [phone].  Normal or non-critical lab and  imaging results will be communicated to you by MyChart, letter or phone within 4 business days after the clinic has received the results. If you do not hear from us within 7 days, please contact the clinic through MyChart or phone. If you have a critical or abnormal lab result, we will notify you by phone as soon as possible.  Submit refill requests through [employer] or call your pharmacy and they will forward the refill request to us. Please allow 3 business days for your refill to be completed.          Additional Information About Your Visit        Care EveryWhere ID     This is your Care EveryWhere ID. This could be used by other organizations to access your [place] medical records  [id number]         Blood Pressure from Last 3 Encounters:   09/21/18 124/80   09/05/18 120/80   06/29/17 116/86    Weight from Last 3 Encounters:   09/21/18 (!) 416 lb (188.7 kg)   09/05/18 (!) 416 lb (188.7 kg)   06/29/17 (!) 402 lb (182.3 kg)              Today, you had the following     No orders found for display       Primary Care Provider Office Phone # Fax #    Jenae Hayward [phone] [address] [facility] [address] [address]        Equal Access to Services     [facility]: Hadii aad ku hadasho Soomaali, waaxda luqadaha, qaybta kaalmada adeegyada, waxay ctin hayvishnun asuncion duvall. So [facility] [phone].    ATENCIÓN: Si habla español, tiene a mak disposición servicios gratuitos de asistencia lingüística. Llame al [phone].    We comply with applicable federal civil rights laws and [place] laws. We do not discriminate on the basis of race, color, national origin, age, disability, sex, sexual orientation, or gender identity.            Thank you!     Thank you for choosing [facility]  for your care. Our goal is always to provide you with excellent care. Hearing back from our patients is one way we can continue to improve our services. Please take a few minutes to  complete the written survey that you may receive in the mail after your visit with us. Thank you!             Your Updated Medication List - Protect others around you: Learn how to safely use, store and throw away your medicines at www.disposemymeds.org.          This list is accurate as of 9/26/18 11:59 PM.  Always use your most recent med list.                   Brand Name Dispense Instructions for use Diagnosis    hydrOXYzine 25 MG capsule    VISTARIL    60 capsule    Take 1-2 capsules (25-50 mg) by mouth 3 times daily as needed for anxiety or other (insomnia)    Panic attack, Social anxiety disorder       ibuprofen 200 MG capsule     120 capsule    Take 600 mg by mouth every 6 hours as needed        levonorgestrel 20 MCG/24HR IUD    MIRENA     1 each by Intrauterine route once        omeprazole 40 MG capsule    priLOSEC    30 capsule    TAKE 1 CAPSULE BY MOUTH EVERY DAY    Gastroesophageal reflux disease, esophagitis presence not specified       ranitidine 150 MG tablet    ZANTAC    60 tablet    Take 1 tablet (150 mg) by mouth 2 times daily    Abdominal pain, epigastric, Gastroesophageal reflux disease, esophagitis presence not specified       sertraline 100 MG tablet    ZOLOFT    30 tablet    Take 1 tablet (100 mg) by mouth daily Take 1/2 tablet (25 mg) for 1-2 weeks, then increase to 1 tablet orally daily    Social anxiety disorder, Panic attack

## 2018-09-26 NOTE — PROGRESS NOTES
[facility] met with patient briefly before her appointment with [facility]. Patient does not currently meet [facility] requirements for enrollment, patient given general clinic 's information (Skylar Khalil) if she has any concerns or needs. Patient plans to start therapy with [facility].

## 2018-09-27 ASSESSMENT — PATIENT HEALTH QUESTIONNAIRE - PHQ9: SUM OF ALL RESPONSES TO PHQ QUESTIONS 1-9: 24

## 2018-09-27 ASSESSMENT — ANXIETY QUESTIONNAIRES: GAD7 TOTAL SCORE: 20

## 2018-09-28 PROBLEM — F33.0 MAJOR DEPRESSIVE DISORDER, RECURRENT EPISODE, MILD (H): Status: ACTIVE | Noted: 2018-09-28

## 2018-09-28 PROBLEM — F41.1 GAD (GENERALIZED ANXIETY DISORDER): Status: ACTIVE | Noted: 2018-09-28

## 2018-09-28 PROBLEM — F41.0 PANIC ATTACK: Status: ACTIVE | Noted: 2018-09-28

## 2018-09-28 PROBLEM — F40.10 SOCIAL ANXIETY DISORDER: Status: ACTIVE | Noted: 2018-09-28

## 2018-10-02 ENCOUNTER — OFFICE VISIT (OUTPATIENT)
Dept: SURGERY | Facility: OTHER | Age: 23
End: 2018-10-02
Attending: FAMILY MEDICINE
Payer: COMMERCIAL

## 2018-10-02 VITALS
TEMPERATURE: 98.6 F | SYSTOLIC BLOOD PRESSURE: 124 MMHG | OXYGEN SATURATION: 99 % | HEART RATE: 78 BPM | DIASTOLIC BLOOD PRESSURE: 86 MMHG

## 2018-10-02 DIAGNOSIS — R10.13 ABDOMINAL PAIN, EPIGASTRIC: ICD-10-CM

## 2018-10-02 DIAGNOSIS — E66.01 MORBID OBESITY (H): Primary | ICD-10-CM

## 2018-10-02 PROCEDURE — 99243 OFF/OP CNSLTJ NEW/EST LOW 30: CPT | Performed by: SURGERY

## 2018-10-02 ASSESSMENT — PAIN SCALES - GENERAL: PAINLEVEL: MODERATE PAIN (5)

## 2018-10-02 NOTE — MR AVS SNAPSHOT
After Visit Summary   10/2/2018    Karrie Oliveira    MRN: [id number]           Patient Information     Date Of Birth          1995        Visit Information        Provider Department      10/2/2018 3:00 PM Todd Bhatt MD [facility] [place]        Care Instructions    Thank you for allowing Dr. Bhatt and our surgical team to participate in your care.  If you have a scheduling or an appointment question please contact Leti, our Health Unit Coordinator at her direct line [phone].   ALL nursing questions or concerns can be directed to your surgical nurse at: [phone]-Karrie          Follow-ups after your visit        Your next 10 appointments already scheduled     Oct 04, 2018  1:00 PM CDT   (Arrive by 12:45 PM)   [facility] [place] [facility]    [address]  [place] [address] [phone]            Oct 11, 2018  1:00 PM CDT   (Arrive by 12:45 PM)   Return [facility] with HUYEN Siddiqui   [facility] [place] [facility]    [address] [facility] [address] [facility] [place] [address] [phone]            Oct 25, 2018  1:00 PM CDT   (Arrive by 12:45 PM)   [facility]   [facility] [place] [facility] [place] [facility]    [address] [facility] [address] [place] [address] [phone]              Who to contact     If you have questions or need follow up information about today's clinic visit or your schedule please contact [facility] directly at [phone].  Normal or non-critical lab and imaging results will be communicated to you by MyChart, letter or phone within 4 business days after the clinic has received the results. If you do not hear from us within 7 days, please contact the clinic through MyChart or phone. If you have a critical or abnormal lab result, we will  notify you by phone as soon as possible.  Submit refill requests through [employer] or call your pharmacy and they will forward the refill request to us. Please allow 3 business days for your refill to be completed.          Additional Information About Your Visit        Care EveryWhere ID     This is your Care EveryWhere ID. This could be used by other organizations to access your [place] medical records  [id number]        Your Vitals Were     Pulse Temperature Pulse Oximetry             78 98.6  F (37  C) (Tympanic) 99%          Blood Pressure from Last 3 Encounters:   10/02/18 124/86   09/21/18 124/80   09/05/18 120/80    Weight from Last 3 Encounters:   09/21/18 (!) 416 lb (188.7 kg)   09/05/18 (!) 416 lb (188.7 kg)   06/29/17 (!) 402 lb (182.3 kg)              Today, you had the following     No orders found for display       Primary Care Provider Office Phone # Fax #    Jenae Hayward [phone] [address] [place] [address]        Equal Access to Services     [facility]: Hadii aad ku hadasho Soomaali, waaxda luqadaha, qaybta kaalmada adeegyadestiny, jet valero . So [facility] [phone].    ATENCIÓN: Si habla español, tiene a mak disposición servicios gratuitos de asistencia lingüística. [facility] [phone].    We comply with applicable federal civil rights laws and [place] laws. We do not discriminate on the basis of race, color, national origin, age, disability, sex, sexual orientation, or gender identity.            Thank you!     Thank you for choosing [facility]  for your care. Our goal is always to provide you with excellent care. Hearing back from our patients is one way we can continue to improve our services. Please take a few minutes to complete the written survey that you may receive in the mail after your visit with us. Thank you!             Your Updated Medication List - Protect others around you: Learn how to  safely use, store and throw away your medicines at www.disposemymeds.org.          This list is accurate as of 10/2/18  3:24 PM.  Always use your most recent med list.                   Brand Name Dispense Instructions for use Diagnosis    hydrOXYzine 25 MG capsule    VISTARIL    60 capsule    Take 1-2 capsules (25-50 mg) by mouth 3 times daily as needed for anxiety or other (insomnia)    Panic attack, Social anxiety disorder       ibuprofen 200 MG capsule     120 capsule    Take 600 mg by mouth every 6 hours as needed        levonorgestrel 20 MCG/24HR IUD    MIRENA     1 each by Intrauterine route once        omeprazole 40 MG capsule    priLOSEC    30 capsule    TAKE 1 CAPSULE BY MOUTH EVERY DAY    Gastroesophageal reflux disease, esophagitis presence not specified       ranitidine 150 MG tablet    ZANTAC    60 tablet    Take 1 tablet (150 mg) by mouth 2 times daily    Abdominal pain, epigastric, Gastroesophageal reflux disease, esophagitis presence not specified       sertraline 100 MG tablet    ZOLOFT    30 tablet    Take 1 tablet (100 mg) by mouth daily Take 1/2 tablet (25 mg) for 1-2 weeks, then increase to 1 tablet orally daily    Social anxiety disorder, Panic attack

## 2018-10-02 NOTE — PATIENT INSTRUCTIONS
Thank you for allowing Dr. Bhatt and our surgical team to participate in your care.  If you have a scheduling or an appointment question please contact Leti, our Health Unit Coordinator at her direct line [phone].   ALL nursing questions or concerns can be directed to your surgical nurse at: [phone]

## 2018-10-02 NOTE — NURSING NOTE
"Chief Complaint   Patient presents with     Consult     upper abdominal pain- Dr. Hayward referring        Initial /86 (BP Location: Right arm, Patient Position: Chair, Cuff Size: Adult Large)  Pulse 78  Temp 98.6  F (37  C) (Tympanic)  SpO2 99% Estimated body mass index is 69.23 kg/(m^2) as calculated from the following:    Height as of 9/21/18: 5' 5\" (1.651 m).    Weight as of 9/21/18: 416 lb (188.7 kg).  Medication Reconciliation: complete    Libra Manzanares LPN    "

## 2018-10-02 NOTE — Clinical Note
Rg Emanuel,  I was wondering if you would mind if I placed a referral for bariatric surgery for Karrie. I discussed the possibility of this with her and she seemed interested. I just don't think any procedures will be of much benefit at her current weight.  Thanks  Eddi

## 2018-10-03 DIAGNOSIS — E66.01 MORBID OBESITY (H): Primary | ICD-10-CM

## 2018-10-03 NOTE — PROGRESS NOTES
[facility] Surgery Consultation    CC:  Upper abdominal pain     HPI:  This 22 year old year old female is seen at the request of Dr. Griffith for evaluation of upper abdominal pain. She reports worsening upper abdominal pain over the last month. It can come on with both eating and without eating. It comes across both sides of her upper abdomen. She has had an US and HIDA scan done by primary which does not show gallstones and normal ejection fraction. She does take ibuprofen on occasion 800mg. She has had no prior abdominal surgery. She takes zantac and Prilosec for GERD and believes this pain is different. She is significantly overweight with a BMI of 69. She has not considered bariatric surgery though would be interested in more information on this.     Past Medical History:   Diagnosis Date     GERD (gastroesophageal reflux disease)      Obesity      Prediabetes 9/2/2016       Past Surgical History:   Procedure Laterality Date     MYRINGOTOMY, INSERT TUBE BILATERAL, COMBINED       TONSILLECTOMY         No Known Allergies    Current Outpatient Prescriptions   Medication     hydrOXYzine (VISTARIL) 25 MG capsule     ibuprofen 200 MG capsule     levonorgestrel (MIRENA) 20 MCG/24HR IUD     omeprazole (PRILOSEC) 40 MG capsule     ranitidine (ZANTAC) 150 MG tablet     sertraline (ZOLOFT) 100 MG tablet     No current facility-administered medications for this visit.        HABITS:    Social History   Substance Use Topics     Smoking status: Never Smoker     Smokeless tobacco: Never Used     Alcohol use No       Family History   Problem Relation Age of Onset     Thyroid Disease Mother      Diabetes Maternal Grandmother      Thyroid Disease Maternal Grandfather      Asthma No family hx of      Coronary Artery Disease No family hx of      Hypertension No family hx of        REVIEW OF SYSTEMS:  Ten point review of systems negative except those mentioned in the HPI.     OBJECTIVE:    /86 (BP Location: Right arm,  Patient Position: Chair, Cuff Size: Adult Large)  Pulse 78  Temp 98.6  F (37  C) (Tympanic)  SpO2 99%  No exam preformed     IMPRESSION:  I had tootie discussion with patient that at her current weight she is to high risk for most surgical procedures without an emergent indication. I brought up the idea of bariatric surgery as a possible solution to her weight problem. I discussed that this is a process and that meeting with a program to get more information would be of benefit. I believe her pain could be related to fatty liver disease and her reflux is most definitely related to her weight. I will run by referral with PCP prior to placing referral. She was in agreement with this. I don't think she would benefit from EGD at this time.     20 minutes spent with patient, over 50% of time was counseling and reviewing imaging         PLAN:    Likely bariatric referral.     Todd Bhatt MD,     10/3/2018  8:43 AM

## 2018-11-12 ENCOUNTER — VIRTUAL VISIT (OUTPATIENT)
Dept: BEHAVIORAL HEALTH | Facility: OTHER | Age: 23
End: 2018-11-12
Payer: COMMERCIAL

## 2018-11-12 DIAGNOSIS — F41.1 GAD (GENERALIZED ANXIETY DISORDER): Primary | ICD-10-CM

## 2018-11-12 PROCEDURE — 98967 PH1 ASSMT&MGMT NQHP 11-20: CPT | Performed by: SOCIAL WORKER

## 2018-11-12 ASSESSMENT — PATIENT HEALTH QUESTIONNAIRE - PHQ9
SUM OF ALL RESPONSES TO PHQ QUESTIONS 1-9: 23
5. POOR APPETITE OR OVEREATING: NEARLY EVERY DAY

## 2018-11-12 ASSESSMENT — ANXIETY QUESTIONNAIRES
6. BECOMING EASILY ANNOYED OR IRRITABLE: NEARLY EVERY DAY
7. FEELING AFRAID AS IF SOMETHING AWFUL MIGHT HAPPEN: NEARLY EVERY DAY
IF YOU CHECKED OFF ANY PROBLEMS ON THIS QUESTIONNAIRE, HOW DIFFICULT HAVE THESE PROBLEMS MADE IT FOR YOU TO DO YOUR WORK, TAKE CARE OF THINGS AT HOME, OR GET ALONG WITH OTHER PEOPLE: EXTREMELY DIFFICULT
5. BEING SO RESTLESS THAT IT IS HARD TO SIT STILL: NEARLY EVERY DAY
GAD7 TOTAL SCORE: 21
3. WORRYING TOO MUCH ABOUT DIFFERENT THINGS: NEARLY EVERY DAY
1. FEELING NERVOUS, ANXIOUS, OR ON EDGE: NEARLY EVERY DAY
2. NOT BEING ABLE TO STOP OR CONTROL WORRYING: NEARLY EVERY DAY

## 2018-11-12 NOTE — PROGRESS NOTES
[facility]  November 12, 2018    Behavioral Health Clinician Progress Note    Patient Name: Karrie Oliveira         Service Type: Phone Visit           Service Location:  Phone call (patient / identified key support person reached)      Session Start Time:  2:00  Session End Time: 2:20      Session Length: 20 minute phone visit     Attendees: Client    Visit Activities (Refresh list every visit): [facility] Only and Phone Encounter      Diagnostic Assessment Date: 9/26/18  Treatment Plan Review Date: To be completed in next 3 visits.    Previous PHQ-9:   PHQ 9/21/2018 9/26/2018 11/12/2018   PHQ-9 Total Score 21 24 23   Q9: Suicide Ideation More than half the days Nearly every day Nearly every day   F/U: Thoughts of suicide or self-harm - No No   F/U: Safety concerns - No No     Previous JUAN-7:   JUAN-7 SCORE 9/21/2018 9/26/2018 11/12/2018   Total Score 21 20 21     In the past two weeks have you had thoughts of suicide or self-harm?  No.    Do you have concerns about your personal safety or the safety of others?   No  Suicide Assessment Five-step Evaluation and Treatment (SAFE-T)  LAUREN LEVEL:  LAUREN Score (Last Two) 9/26/2018   LAUREN Raw Score 24   Activation Score 40.9   LAUREN Level 1       DATA  Extended Session (60+ minutes): No  Interactive Complexity: No  Crisis: No  [facility] Patient No    Treatment Objective(s) Addressed in This Session:  Target Behavior(s):  Depressed Mood: Increase interest, engagement, and pleasure in doing things  Decrease frequency and intensity of feeling down, depressed, hopeless  Improve quantity and quality of night time sleep / decrease daytime naps  Feel less tired and more energy during the day   Improve diet, appetite, mindful eating, and / or meal planning  Identify negative self-talk and behaviors: challenge core beliefs, myths, and actions  Improve concentration, focus, and mindfulness in daily activities   Feel less fidgety, restless or slow in daily activities /  interpersonal interactions  Decrease thoughts that you'd be better off dead or of suicide / self-harm  Anxiety: will experience a reduction in anxiety, will develop more effective coping skills to manage anxiety symptoms, will develop healthy cognitive patterns and beliefs and will increase ability to function adaptively    Current Stressors / Issues:  Has been having more nightmares lately.  She states that she also has not had her prescription for Zoloft, but does have the hydroxyzine.  Has been using that more regularly because of not having the Zoloft.  Will attempt to make follow up appointment in clinic.    Coping skills: listening to music, talking to supports, playing games on phone, watching TV.  Does use some deep breathing techniques.    Progress on Treatment Objective(s) / Homework:  New Objective established this session - PRECONTEMPLATION (Not seeing need for change); Intervened by educating the patient about the effects of current behavior on health.  Evoked information about reasons to continue behavior, express concern / recommendations, and explored any change talk    Motivational Interviewing    MI Intervention: Expressed Empathy/Understanding, Supported Autonomy, Collaboration, Evocation and Open-ended questions     Change Talk Expressed by the Patient: NA - Precontemplative    Provider Response to Change Talk: E - Evoked more info from patient about behavior change, A - Affirmed patient's thoughts, decisions, or attempts at behavior change, R - Reflected patient's change talk and S - Summarized patient's change talk statements      SOLUTION FOCUSED: Explored patterns in patient's relationships and discussed options for new behaviors, Explored patterns in patient's actions and choices and discussed options for new behaviors    Care Plan review completed: No    Medication Review:  No changes to current psychiatric medication(s)    Medication Compliance:  Yes    Changes in Health Issues:   None  reported    Chemical Use Review:   Substance Use: Chemical use reviewed, no active concerns identified      Tobacco Use: No current tobacco use.      Assessment: Current Emotional :    Risk status (Self / Other harm or suicidal ideation)  Patient denies current fears or concerns for personal safety.  Patient reports the following current or recent suicidal ideation or behaviors: Patient states she has suicidal ideation and thoughts of not wanting to be alive any longer.  However denies intent or plan to hurt herself.  Contracts for safety.  Patient denies current or recent homicidal ideation or behaviors.  Patient denies current or recent self injurious behavior or ideation.  Patient denies other safety concerns.  A safety and risk management plan has not been developed at this time, however patient was encouraged to call [place] [phone] should there be a change in any of these risk factors.    Diagnoses:  1. JUAN (generalized anxiety disorder)        Collateral Reports Completed:  Not Applicable    Plan: (Homework, other):  Patient was given information about behavioral services and encouraged to schedule a follow up appointment with the clinic [facility] in 1 week.  She was also given Cognitive Behavioral Therapy skills to practice when experiencing anxiety and depression.  CD Recommendations: No indications of CD issues.      Hiral Fierro, [facility], [facility]

## 2018-11-12 NOTE — MR AVS SNAPSHOT
After Visit Summary   11/12/2018    Karrie Oliveira    MRN: [id number]           Patient Information     Date Of Birth          1995        Visit Information        Provider Department      11/12/2018 2:00 PM Hiral Fierro LICSW [facility]        Today's Diagnoses     JUAN (generalized anxiety disorder)    -  1       Follow-ups after your visit        Your next 10 appointments already scheduled     Nov 20, 2018  3:00 PM CST   (Arrive by 2:45 PM)   Return Visit with HUYEN Siddiqui   [facility]    [address] [place] [id number] [phone]              Who to contact     If you have questions or need follow up information about today's clinic visit or your schedule please contact [facility] directly at [phone].  Normal or non-critical lab and imaging results will be communicated to you by MyChart, letter or phone within 4 business days after the clinic has received the results. If you do not hear from us within 7 days, please contact the clinic through MyChart or phone. If you have a critical or abnormal lab result, we will notify you by phone as soon as possible.  Submit refill requests through "[employer] or call your pharmacy and they will forward the refill request to us. Please allow 3 business days for your refill to be completed.          Additional Information About Your Visit        Care EveryWhere ID     This is your Care EveryWhere ID. This could be used by other organizations to access your [place] medical records  [id number]         Blood Pressure from Last 3 Encounters:   10/02/18 124/86   09/21/18 124/80   09/05/18 120/80    Weight from Last 3 Encounters:   09/21/18 (!) 188.7 kg (416 lb)   09/05/18 (!) 188.7 kg (416 lb)   06/29/17 (!) 182.3 kg (402 lb)              Today, you had the following     No orders found for display       Primary Care  Provider Office Phone # Fax #    Jenae Hayward [phone] [address]  [facility] [address]        Equal Access to Services     [place] : Hadedmar calli wolfe cindao Sosriram, waaxda luqadaha, qaybta kaalmada dejah, jet salinas lamichaelconstanza jadiel. So [facility] [phone].    ATENCIÓN: Si habla español, tiene a mak disposición servicios gratuitos de asistencia lingüística. Llame al [phone].    We comply with applicable federal civil rights laws and [place] laws. We do not discriminate on the basis of race, color, national origin, age, disability, sex, sexual orientation, or gender identity.            Thank you!     Thank you for choosing [facility]  for your care. Our goal is always to provide you with excellent care. Hearing back from our patients is one way we can continue to improve our services. Please take a few minutes to complete the written survey that you may receive in the mail after your visit with us. Thank you!             Your Updated Medication List - Protect others around you: Learn how to safely use, store and throw away your medicines at www.disposemymeds.org.          This list is accurate as of 11/12/18 11:59 PM.  Always use your most recent med list.                   Brand Name Dispense Instructions for use Diagnosis    hydrOXYzine 25 MG capsule    VISTARIL    60 capsule    Take 1-2 capsules (25-50 mg) by mouth 3 times daily as needed for anxiety or other (insomnia)    Panic attack, Social anxiety disorder       ibuprofen 200 MG capsule     120 capsule    Take 600 mg by mouth every 6 hours as needed        levonorgestrel 20 MCG/24HR IUD    MIRENA     1 each by Intrauterine route once        omeprazole 40 MG capsule    priLOSEC    30 capsule    TAKE 1 CAPSULE BY MOUTH EVERY DAY    Gastroesophageal reflux disease, esophagitis presence not specified       ranitidine 150 MG tablet    ZANTAC    60 tablet    Take 1 tablet (150 mg)  by mouth 2 times daily    Abdominal pain, epigastric, Gastroesophageal reflux disease, esophagitis presence not specified       sertraline 100 MG tablet    ZOLOFT    30 tablet    Take 1 tablet (100 mg) by mouth daily Take 1/2 tablet (25 mg) for 1-2 weeks, then increase to 1 tablet orally daily    Social anxiety disorder, Panic attack

## 2018-11-13 ASSESSMENT — ANXIETY QUESTIONNAIRES: GAD7 TOTAL SCORE: 21

## 2018-11-20 ENCOUNTER — OFFICE VISIT (OUTPATIENT)
Dept: BEHAVIORAL HEALTH | Facility: OTHER | Age: 23
End: 2018-11-20
Attending: SOCIAL WORKER
Payer: COMMERCIAL

## 2018-11-20 DIAGNOSIS — F41.1 GAD (GENERALIZED ANXIETY DISORDER): Primary | ICD-10-CM

## 2018-11-20 DIAGNOSIS — F40.10 SOCIAL ANXIETY DISORDER: ICD-10-CM

## 2018-11-20 PROCEDURE — 90832 PSYTX W PT 30 MINUTES: CPT | Performed by: SOCIAL WORKER

## 2018-11-20 ASSESSMENT — ANXIETY QUESTIONNAIRES
7. FEELING AFRAID AS IF SOMETHING AWFUL MIGHT HAPPEN: NEARLY EVERY DAY
IF YOU CHECKED OFF ANY PROBLEMS ON THIS QUESTIONNAIRE, HOW DIFFICULT HAVE THESE PROBLEMS MADE IT FOR YOU TO DO YOUR WORK, TAKE CARE OF THINGS AT HOME, OR GET ALONG WITH OTHER PEOPLE: EXTREMELY DIFFICULT
5. BEING SO RESTLESS THAT IT IS HARD TO SIT STILL: NEARLY EVERY DAY
GAD7 TOTAL SCORE: 21
6. BECOMING EASILY ANNOYED OR IRRITABLE: NEARLY EVERY DAY
3. WORRYING TOO MUCH ABOUT DIFFERENT THINGS: NEARLY EVERY DAY
1. FEELING NERVOUS, ANXIOUS, OR ON EDGE: NEARLY EVERY DAY
2. NOT BEING ABLE TO STOP OR CONTROL WORRYING: NEARLY EVERY DAY

## 2018-11-20 ASSESSMENT — PATIENT HEALTH QUESTIONNAIRE - PHQ9
5. POOR APPETITE OR OVEREATING: NEARLY EVERY DAY
SUM OF ALL RESPONSES TO PHQ QUESTIONS 1-9: 26

## 2018-11-20 NOTE — MR AVS SNAPSHOT
After Visit Summary   11/20/2018    Karrie Oliveira    MRN: [id number]           Patient Information     Date Of Birth          1995        Visit Information        Provider Department      11/20/2018 3:00 PM Hiral Fierro [facility]        Today's Diagnoses     JUAN (generalized anxiety disorder)    -  1    Social anxiety disorder           Follow-ups after your visit        Additional Services     MENTAL HEALTH REFERRAL  - Adult; Outpatient Treatment; Individual/Couples/Family/Group Therapy/Health Psychology; Range: Counseling Clinic   [facility] [phone]; We will contact you to schedule the appointment or please call ...       Referral from Hiral Fierro to Hiral Ramirez.                  Your next 10 appointments already scheduled     Nov 30, 2018  3:00 PM CST   (Arrive by 2:45 PM)   Return Visit with HUYEN Siddiqui   [facility] ([facility] )    [address] [place] [address] [phone]              Who to contact     If you have questions or need follow up information about today's clinic visit or your schedule please contact [facility] directly at [phone].  Normal or non-critical lab and imaging results will be communicated to you by MyChart, letter or phone within 4 business days after the clinic has received the results. If you do not hear from us within 7 days, please contact the clinic through MyChart or phone. If you have a critical or abnormal lab result, we will notify you by phone as soon as possible.  Submit refill requests through [employer] or call your pharmacy and they will forward the refill request to us. Please allow 3 business days for your refill to be completed.          Additional Information About Your Visit        Care EveryWhere ID     This is your Care EveryWhere ID. This could be used by other organizations to access  your [place] medical records  [id number]         Blood Pressure from Last 3 Encounters:   10/02/18 124/86   09/21/18 124/80   09/05/18 120/80    Weight from Last 3 Encounters:   09/21/18 (!) 188.7 kg (416 lb)   09/05/18 (!) 188.7 kg (416 lb)   06/29/17 (!) 182.3 kg (402 lb)              We Performed the Following     MENTAL HEALTH REFERRAL  - Adult; Outpatient Treatment; Individual/Couples/Family/Group Therapy/Health Psychology; Range: Counseling Clinic   [facility] [place] [phone]; We will contact you to schedule the appointment or please call ...        Primary Care Provider Office Phone # Fax #    Jenae Hayward [phone] [address] [place] [address]        Equal Access to Services     [facility]: Hadii aad ku hadasho Sosriram, waaxda luqadaha, qaybta kaalmada adejazmínyadestiny, jet valero . So [facility] [phone].    ATENCIÓN: Si habla español, tiene a mak disposición servicios gratuitos de asistencia lingüística. Llame al [phone].    We comply with applicable federal civil rights laws and [place] laws. We do not discriminate on the basis of race, color, national origin, age, disability, sex, sexual orientation, or gender identity.            Thank you!     Thank you for choosing [facility]  for your care. Our goal is always to provide you with excellent care. Hearing back from our patients is one way we can continue to improve our services. Please take a few minutes to complete the written survey that you may receive in the mail after your visit with us. Thank you!             Your Updated Medication List - Protect others around you: Learn how to safely use, store and throw away your medicines at www.disposemymeds.org.          This list is accurate as of 11/20/18 11:59 PM.  Always use your most recent med list.                   Brand Name Dispense Instructions for use Diagnosis    hydrOXYzine 25 MG  capsule    VISTARIL    60 capsule    Take 1-2 capsules (25-50 mg) by mouth 3 times daily as needed for anxiety or other (insomnia)    Panic attack, Social anxiety disorder       ibuprofen 200 MG capsule    ADVIL/MOTRIN    120 capsule    Take 600 mg by mouth every 6 hours as needed        levonorgestrel 20 MCG/24HR IUD    MIRENA     1 each by Intrauterine route once        omeprazole 40 MG capsule    priLOSEC    30 capsule    TAKE 1 CAPSULE BY MOUTH EVERY DAY    Gastroesophageal reflux disease, esophagitis presence not specified       ranitidine 150 MG tablet    ZANTAC    60 tablet    Take 1 tablet (150 mg) by mouth 2 times daily    Abdominal pain, epigastric, Gastroesophageal reflux disease, esophagitis presence not specified       sertraline 100 MG tablet    ZOLOFT    30 tablet    Take 1 tablet (100 mg) by mouth daily Take 1/2 tablet (25 mg) for 1-2 weeks, then increase to 1 tablet orally daily    Social anxiety disorder, Panic attack

## 2018-11-20 NOTE — PROGRESS NOTES
[facility]  November 20, 2018    Behavioral Health Clinician Progress Note    Patient Name: Karrie Oliveira         Service Type: Individual           Service Location:  Face to Face in Clinic      Session Start Time:  3:00  Session End Time: 3:30      Session Length: 16 - 37      Attendees: Patient and Spouse / Significant Other    Visit Activities (Refresh list every visit): [facility] Only      Diagnostic Assessment Date: 9/26/18  Treatment Plan Review Date: To be completed in next 2 visits.    Previous PHQ-9:   PHQ 9/26/2018 11/12/2018 11/20/2018   PHQ-9 Total Score 24 23 26   Q9: Suicide Ideation Nearly every day Nearly every day Nearly every day   F/U: Thoughts of suicide or self-harm No No -   F/U: Safety concerns No No -     Previous JUAN-7:   JUAN-7 SCORE 9/26/2018 11/12/2018 11/20/2018   Total Score 20 21 21     In the past two weeks have you had thoughts of suicide or self-harm?  No.    Do you have concerns about your personal safety or the safety of others?   No  Suicide Assessment Five-step Evaluation and Treatment (SAFE-T)  LAUREN LEVEL:  LAUREN Score (Last Two) 9/26/2018   LAUREN Raw Score 24   Activation Score 40.9   LAUREN Level 1       DATA  Extended Session (60+ minutes): No  Interactive Complexity: No  Crisis: No  [facility] Patient No    Treatment Objective(s) Addressed in This Session:  Target Behavior(s):  Depressed Mood: Increase interest, engagement, and pleasure in doing things  Decrease frequency and intensity of feeling down, depressed, hopeless  Improve quantity and quality of night time sleep / decrease daytime naps  Feel less tired and more energy during the day   Improve diet, appetite, mindful eating, and / or meal planning  Identify negative self-talk and behaviors: challenge core beliefs, myths, and actions  Improve concentration, focus, and mindfulness in daily activities   Feel less fidgety, restless or slow in daily activities / interpersonal interactions  Decrease thoughts that  you'd be better off dead or of suicide / self-harm  Anxiety: will experience a reduction in anxiety, will develop more effective coping skills to manage anxiety symptoms, will develop healthy cognitive patterns and beliefs and will increase ability to function adaptively    Current Stressors / Issues:  Continues with high stressors.  Has been having a lot of panic attacks.  Also discusses disturbing dreams/ night holder.  Reports that some of them are in relation to some of her traumatic past.  Discussed therapeutic interventions to address these symptoms.  Discussed referral to a trauma specialist.  Patient on board and in agreement with this referral.    Coping skills: listening to music, talking to supports, playing games on phone, watching TV.  Does use some deep breathing techniques.    Progress on Treatment Objective(s) / Homework:  New Objective established this session - PRECONTEMPLATION (Not seeing need for change); Intervened by educating the patient about the effects of current behavior on health.  Evoked information about reasons to continue behavior, express concern / recommendations, and explored any change talk    Motivational Interviewing    MI Intervention: Expressed Empathy/Understanding, Supported Autonomy, Collaboration, Evocation and Open-ended questions     Change Talk Expressed by the Patient: NA - Precontemplative    Provider Response to Change Talk: E - Evoked more info from patient about behavior change, A - Affirmed patient's thoughts, decisions, or attempts at behavior change, R - Reflected patient's change talk and S - Summarized patient's change talk statements      SOLUTION FOCUSED: Explored patterns in patient's relationships and discussed options for new behaviors, Explored patterns in patient's actions and choices and discussed options for new behaviors    Care Plan review completed: No    Medication Review:  No changes to current psychiatric medication(s)    Medication  Compliance:  Yes    Changes in Health Issues:   None reported    Chemical Use Review:   Substance Use: Chemical use reviewed, no active concerns identified      Tobacco Use: No current tobacco use.      Assessment: Current Emotional :    Risk status (Self / Other harm or suicidal ideation)  Patient denies current fears or concerns for personal safety.  Patient reports the following current or recent suicidal ideation or behaviors: Patient states she has suicidal ideation and thoughts of not wanting to be alive any longer.  However denies intent or plan to hurt herself.  Contracts for safety.  Patient denies current or recent homicidal ideation or behaviors.  Patient denies current or recent self injurious behavior or ideation.  Patient denies other safety concerns.  A safety and risk management plan has not been developed at this time, however patient was encouraged to call [place] [phone] should there be a change in any of these risk factors.    Diagnoses:  1. JUAN (generalized anxiety disorder)    2. Social anxiety disorder        Collateral Reports Completed:  Referral to Hiral Ramirez for trauma therapy    Plan: (Homework, other):  Patient was given information about behavioral services and encouraged to schedule a follow up appointment with the clinic [facility] in 1 week.  She was also given Cognitive Behavioral Therapy skills to practice when experiencing anxiety and depression.  CD Recommendations: No indications of CD issues.      Hiral Fierro, [facility], [facility]

## 2018-11-21 ASSESSMENT — ANXIETY QUESTIONNAIRES: GAD7 TOTAL SCORE: 21

## 2019-01-15 NOTE — PROGRESS NOTES
SUBJECTIVE:   Karrie Oliveira is a 23 year old female who presents to clinic today for the following health issues:    Depression and Anxiety Follow-Up    Status since last visit: Worsened - has been out of medication for one month. (Zoloft) Is more irritable, having more nightmares, poor sleep.  Didn't really help with social anxiety.  Max trial dose was 100 mg.    Other associated symptoms: Feeling down, does not get out of bed most days, anxious when out in public, has been having thoughts about suicide and self harm however currently has no plan or intent.     Complicating factors:     Significant life event: No     Current substance abuse: None    No new stressors    Spends days at home; goes to parents house on weekends; does some house cleaning, crafts, tv/phone    Prior therapy with H[facility] - but hasn't been in past couple months; did advise trauma specialist in [place], but hasn't scheduled yet    PHQ 9/26/2018 11/12/2018 11/20/2018   PHQ-9 Total Score 24 23 26   Q9: Suicide Ideation Nearly every day Nearly every day Nearly every day   F/U: Thoughts of suicide or self-harm No No -   F/U: Safety concerns No No -     JUAN-7 SCORE 9/26/2018 11/12/2018 11/20/2018   Total Score 20 21 21       PHQ-9  English  PHQ-9   Any Language  JUAN-7  Suicide Assessment Five-step Evaluation and Treatment (SAFE-T)    Amount of exercise or physical activity: None    Problems taking medications regularly: No    Medication side effects: none    Diet: regular (no restrictions)    Ear Problem       Duration: Ongoing for two weeks     Description (location/character/radiation): Right ear pain that radiates into right side of jaw.     Intensity:  moderate, 5/10    Accompanying signs and symptoms: None     History (similar episodes/previous evaluation): Patient states come and goes around each winter time.     Precipitating or alleviating factors: None    Therapies tried and outcome: ibuprofen - no relief     Occasional  discharge/drainage    Some swelling behind ear in the winter    Pain when eating    Clenches/grinds; prior mouth guard; hasn't seen dentist            Problem list and histories reviewed & adjusted, as indicated.  Additional history: as documented    Current Outpatient Medications   Medication     hydrOXYzine (VISTARIL) 25 MG capsule     ibuprofen 200 MG capsule     levonorgestrel (MIRENA) 20 MCG/24HR IUD     omeprazole (PRILOSEC) 40 MG capsule     ranitidine (ZANTAC) 150 MG tablet     venlafaxine (EFFEXOR-ER) 37.5 MG 24 hr tablet     No current facility-administered medications for this visit.        Patient Active Problem List   Diagnosis     Obesity     Esophageal reflux     ACP (advance care planning)     Prediabetes     DUB (dysfunctional uterine bleeding)     PCO (polycystic ovaries)     Morbid obesity (H)     Anxiety     Fatty liver     JUAN (generalized anxiety disorder)     Panic attack     Social anxiety disorder     Major depressive disorder, recurrent episode, mild (H)     Past Surgical History:   Procedure Laterality Date     MYRINGOTOMY, INSERT TUBE BILATERAL, COMBINED       TONSILLECTOMY         Social History     Tobacco Use     Smoking status: Never Smoker     Smokeless tobacco: Never Used   Substance Use Topics     Alcohol use: No     Alcohol/week: 0.0 oz     Family History   Problem Relation Age of Onset     Thyroid Disease Mother      Diabetes Maternal Grandmother      Thyroid Disease Maternal Grandfather      Asthma No family hx of      Coronary Artery Disease No family hx of      Hypertension No family hx of            Reviewed and updated as needed this visit by clinical staff       Reviewed and updated as needed this visit by Provider         ROS:  Constitutional, HEENT, cardiovascular, pulmonary, gi and gu systems are negative, except as otherwise noted.    OBJECTIVE:     /88 (BP Location: Right arm, Patient Position: Chair, Cuff Size: Adult Large)   Pulse 76   Temp 98.4  F (36.9  C)  (Tympanic)   Wt (!) 193.5 kg (426 lb 8 oz)   SpO2 97%   BMI 70.97 kg/m    Body mass index is 70.97 kg/m .  GENERAL: alert, no distress and obese  EYES: Eyes grossly normal to inspection, PERRL and conjunctivae and sclerae normal  HENT: normal cephalic/atraumatic, both ears: normal: no effusions, no erythema, normal landmarks and scarring both TMs, no cerumen, nose and mouth without ulcers or lesions, oropharynx clear, oral mucous membranes moist; tender to palpation right TMJ; no clicking; able to open jaw fully  NECK: no adenopathy, no asymmetry, masses, or scars and thyroid normal to palpation  RESP: lungs clear to auscultation - no rales, rhonchi or wheezes  CV: regular rate and rhythm, normal S1 S2, no S3 or S4, no murmur, click or rub, no peripheral edema and peripheral pulses strong  MS: no gross musculoskeletal defects noted, no edema  PSYCH: mentation appears normal, affect normal/bright    Diagnostic Test Results:  none     ASSESSMENT/PLAN:     (F40.10) Social anxiety disorder  (primary encounter diagnosis)  Comment: uncontrolled  Plan: venlafaxine (EFFEXOR-ER) 37.5 MG 24 hr tablet        Would like to try alternative to Zoloft.  Has been off a month, so no need to taper.  Given predominately anxiety symptoms, and morbid obesity consideration, will try Effexor XR.  Patient agrees.  Will also get more counseling set up with Hiral.    (F41.1) JUAN (generalized anxiety disorder)  Comment: as above  Plan: venlafaxine (EFFEXOR-ER) 37.5 MG 24 hr tablet            (F33.0) Major depressive disorder, recurrent episode, mild (H)  Comment: as above; depression is secondary  Plan: venlafaxine (EFFEXOR-ER) 37.5 MG 24 hr tablet            (E66.01) Morbid obesity (H)  Comment: offered referral for dietician - will consider; exercise counseling don4  Plan: venlafaxine (EFFEXOR-ER) 37.5 MG 24 hr tablet            (M26.609) TMJ (temporomandibular joint syndrome)  Comment: reassured - ear is without infection  Plan:  discussed heat, ice, NSAIDs, soft diet, mouth guard; consider PT; follow up with dentist as well    Stay off Zoloft.  Start Effexor XR 37.5 mg daily.  After 1 week, increase to 2 tabs or 75 mg daily.  Restart counseling with Hiral Danielson I will send her a message.  Follow up 1 month, sooner if concerns.  Consider dietician consult.    Heat, ice, Ibuprofen/Tylenol, soft/liquid diet.  Follow up with dentist.  Consider physical therapy.      Jenae Griffith MD  [facility] [place]

## 2019-01-16 ENCOUNTER — OFFICE VISIT (OUTPATIENT)
Dept: FAMILY MEDICINE | Facility: OTHER | Age: 24
End: 2019-01-16
Attending: FAMILY MEDICINE
Payer: COMMERCIAL

## 2019-01-16 ENCOUNTER — TELEPHONE (OUTPATIENT)
Dept: BEHAVIORAL HEALTH | Facility: OTHER | Age: 24
End: 2019-01-16

## 2019-01-16 VITALS
HEART RATE: 76 BPM | TEMPERATURE: 98.4 F | BODY MASS INDEX: 70.97 KG/M2 | OXYGEN SATURATION: 97 % | WEIGHT: 293 LBS | DIASTOLIC BLOOD PRESSURE: 88 MMHG | SYSTOLIC BLOOD PRESSURE: 132 MMHG

## 2019-01-16 DIAGNOSIS — E66.01 MORBID OBESITY (H): ICD-10-CM

## 2019-01-16 DIAGNOSIS — F33.0 MAJOR DEPRESSIVE DISORDER, RECURRENT EPISODE, MILD (H): ICD-10-CM

## 2019-01-16 DIAGNOSIS — M26.609 TMJ (TEMPOROMANDIBULAR JOINT SYNDROME): ICD-10-CM

## 2019-01-16 DIAGNOSIS — F40.10 SOCIAL ANXIETY DISORDER: Primary | ICD-10-CM

## 2019-01-16 DIAGNOSIS — F41.1 GAD (GENERALIZED ANXIETY DISORDER): ICD-10-CM

## 2019-01-16 PROCEDURE — 99214 OFFICE O/P EST MOD 30 MIN: CPT | Performed by: FAMILY MEDICINE

## 2019-01-16 RX ORDER — VENLAFAXINE HYDROCHLORIDE 37.5 MG/1
37.5 TABLET, EXTENDED RELEASE ORAL DAILY
Qty: 60 TABLET | Refills: 1 | Status: SHIPPED | OUTPATIENT
Start: 2019-01-16 | End: 2019-06-17

## 2019-01-16 ASSESSMENT — PAIN SCALES - GENERAL: PAINLEVEL: NO PAIN (0)

## 2019-01-16 NOTE — PATIENT INSTRUCTIONS
Stay off Zoloft.  Start Effexor XR 37.5 mg daily.  After 1 week, increase to 2 tabs or 75 mg daily.  Restart counseling with Hiral Danielson I will send her a message.  Follow up 1 month, sooner if concerns.  Consider dietician consult.    Heat, ice, Ibuprofen/Tylenol, soft/liquid diet.  Follow up with dentist.  Consider physical therapy.

## 2019-01-16 NOTE — LETTER
My Depression Action Plan  Name: Karrie Oliveira   Date of Birth 1995  Date: 1/15/2019    My doctor: Jenae Hayward   My clinic: [facility] [place]  [address]  [place] [address]  [phone]          GREEN    ZONE   Good Control    What it looks like:     Things are going generally well. You have normal up s and down s. You may even feel depressed from time to time, but bad moods usually last less than a day.   What you need to do:  1. Continue to care for yourself (see self care plan)  2. Check your depression survival kit and update it as needed  3. Follow your physician s recommendations including any medication.  4. Do not stop taking medication unless you consult with your physician first.           YELLOW         ZONE Getting Worse    What it looks like:     Depression is starting to interfere with your life.     It may be hard to get out of bed; you may be starting to isolate yourself from others.    Symptoms of depression are starting to last most all day and this has happened for several days.     You may have suicidal thoughts but they are not constant.   What you need to do:     1. Call your care team, your response to treatment will improve if you keep your care team informed of your progress. Yellow periods are signs an adjustment may need to be made.     2. Continue your self-care, even if you have to fake it!    3. Talk to someone in your support network    4. Open up your depression survival kit           RED    ZONE Medical Alert - Get Help    What it looks like:     Depression is seriously interfering with your life.     You may experience these or other symptoms: You can t get out of bed most days, can t work or engage in other necessary activities, you have trouble taking care of basic hygiene, or basic responsibilities, thoughts of suicide or death that will not go away, self-injurious behavior.     What you need to do:  1. Call your care team and  request a same-day appointment. If they are not available (weekends or after hours) call your local crisis line, emergency room or 911.            Depression Self Care Plan / Survival Kit    Self-Care for Depression  Here s the deal. Your body and mind are really not as separate as most people think.  What you do and think affects how you feel and how you feel influences what you do and think. This means if you do things that people who feel good do, it will help you feel better.  Sometimes this is all it takes.  There is also a place for medication and therapy depending on how severe your depression is, so be sure to consult with your medical provider and/ or Behavioral Health Consultant if your symptoms are worsening or not improving.     In order to better manage my stress, I will:    Exercise  Get some form of exercise, every day. This will help reduce pain and release endorphins, the  feel good  chemicals in your brain. This is almost as good as taking antidepressants!  This is not the same as joining a gym and then never going! (they count on that by the way ) It can be as simple as just going for a walk or doing some gardening, anything that will get you moving.      Hygiene   Maintain good hygiene (Get out of bed in the morning, Make your bed, Brush your teeth, Take a shower, and Get dressed like you were going to work, even if you are unemployed).  If your clothes don't fit try to get ones that do.    Diet  I will strive to eat foods that are good for me, drink plenty of water, and avoid excessive sugar, caffeine, alcohol, and other mood-altering substances.  Some foods that are helpful in depression are: complex carbohydrates, B vitamins, flaxseed, fish or fish oil, fresh fruits and vegetables.    Psychotherapy  I agree to participate in Individual Therapy (if recommended).    Medication  If prescribed medications, I agree to take them.  Missing doses can result in serious side effects.  I understand that  drinking alcohol, or other illicit drug use, may cause potential side effects.  I will not stop my medication abruptly without first discussing it with my provider.    Staying Connected With Others  I will stay in touch with my friends, family members, and my primary care provider/team.    Use your imagination  Be creative.  We all have a creative side; it doesn t matter if it s oil painting, sand castles, or mud pies! This will also kick up the endorphins.    Witness Beauty  (AKA stop and smell the roses) Take a look outside, even in mid-winter. Notice colors, textures. Watch the squirrels and birds.     Service to others  Be of service to others.  There is always someone else in need.  By helping others we can  get out of ourselves  and remember the really important things.  This also provides opportunities for practicing all the other parts of the program.    Humor  Laugh and be silly!  Adjust your TV habits for less news and crime-drama and more comedy.    Control your stress  Try breathing deep, massage therapy, biofeedback, and meditation. Find time to relax each day.     My support system    Clinic Contact:  Phone number:    Contact 1:  Phone number:    Contact 2:  Phone number:    Taoism/:  Phone number:    Therapist:  Phone number:    Local crisis center:    Phone number:    Other community support:  Phone number:

## 2019-01-16 NOTE — TELEPHONE ENCOUNTER
[facility] Phone Encounter    BEHAVIORAL HEALTH CLINICIAN note    January 16, 2019    BEHAVIORAL HEALTH CLINICIAN was requested by PCP to reach out to patient.  PCP encouraged patient to re-connect with [facility].  Patient is familiar to [facility] due to previous therapeutic relationship.  Presenting Issue: depression, anxiety.    Patient did not answer call.  Left message to return call.    JULIA Aguilar, [facility], [facility]

## 2019-01-16 NOTE — NURSING NOTE
"Chief Complaint   Patient presents with     Depression     Anxiety       Initial /88 (BP Location: Right arm, Patient Position: Chair, Cuff Size: Adult Large)   Pulse 76   Temp 98.4  F (36.9  C) (Tympanic)   Wt (!) 193.5 kg (426 lb 8 oz)   SpO2 97%   BMI 70.97 kg/m   Estimated body mass index is 70.97 kg/m  as calculated from the following:    Height as of 9/21/18: 1.651 m (5' 5\").    Weight as of this encounter: 193.5 kg (426 lb 8 oz).  Medication Reconciliation: complete    Christina Mercedes LPN    "

## 2019-01-27 ENCOUNTER — TRANSFERRED RECORDS (OUTPATIENT)
Dept: HEALTH INFORMATION MANAGEMENT | Facility: CLINIC | Age: 24
End: 2019-01-27

## 2019-03-28 ENCOUNTER — TELEPHONE (OUTPATIENT)
Dept: FAMILY MEDICINE | Facility: OTHER | Age: 24
End: 2019-03-28

## 2019-03-28 NOTE — TELEPHONE ENCOUNTER
PHQ-9 due now for patient (5-7 months from index date)  Index Date: 9-  Last PHQ-9 score: 21 on November 20th 2018  F/U Start date: 2-26-19  F/U End date: 4-26-19    Patient has had a number of no show and cancelled appt with Hiral Fierro but PCP is  Dr Hayward    Nursing Staff: Based on this patient s last PHQ-9 score, they fall into our  High Risk PHQ-9 Range  (20-27), please call patient to schedule a Depression Follow-Up Office Visit appointment between 2- and 4- for medication review and evaluation.

## 2019-05-16 ENCOUNTER — TRANSFERRED RECORDS (OUTPATIENT)
Dept: HEALTH INFORMATION MANAGEMENT | Facility: CLINIC | Age: 24
End: 2019-05-16

## 2019-05-16 LAB
CREAT SERPL-MCNC: 0.79 MG/DL (ref 0.4–1)
GFR SERPL CREATININE-BSD FRML MDRD: >60 ML/MIN/1.73M2
GLUCOSE SERPL-MCNC: 121 MG/DL (ref 70–99)
INR PPP: 1.1 (ref 0.9–1.1)
POTASSIUM SERPL-SCNC: 4.3 MEQ/L (ref 3.4–5.1)

## 2019-05-17 ENCOUNTER — TRANSFERRED RECORDS (OUTPATIENT)
Dept: HEALTH INFORMATION MANAGEMENT | Facility: CLINIC | Age: 24
End: 2019-05-17

## 2019-06-10 NOTE — PROGRESS NOTES
Subjective     Karrie Olvieira is a 23 year old female who presents to clinic today for the following health issues:    HPI   Chest Pain      Onset: ongoing for 3 weeks, has been in [facility] ER 2 weeks ago; diagnosed with costochondritis; xray and labs negative, including Troponin     Description (location/character/radiation/duration): pain is in the chest, breasts, upper abdomen    Intensity:  Moderate. Has got severe    Accompanying signs and symptoms: hurts to push on it, tight pain,  Pain in chest, breasts and upper abdomen, chest pain radiates into left and right shoulders and into left and right arms         Shortness of breath: YES- sometimes       Sweating: no        Nausea/vomitting: YES- walked 06/15/19 and started getting a side cramp and felt nausea       Palpitations: no        Other (fevers/chills/cough/heartburn/lightheadedness): no     History (similar episodes/previous evaluation): None    Precipitating or alleviating factors:       Worse with exertion: YES       Worse with breathing: YES-       Related to eating: no        Better with burping: no     Therapies tried and outcome: ibuprofen hasnt helped; prescription NSAID Toradol from ER not helpful either    No change over weeks; this pain doesn't interfere at night, but reflux dose    More reflux recently; is on PPI; sometimes adds pepto or Zantac; wakes up with acid taste; no abnormal bleeding    No prior EGD    Depression and Anxiety Follow-Up    How are you doing with your depression since your last visit? No change    How are you doing with your anxiety since your last visit?  No change    Are you having other symptoms that might be associated with depression or anxiety? Yes:  same    Have you had a significant life event? No     Do you have any concerns with your use of alcohol or other drugs? No     Was started pm Effexor 1/2019; did not feel it was helping; unsure about side effects    Prior Zoloft    Sleep issue  Is tired all the time.   Daily headaches.  Is snoring.  Significant other endorses witnessed apnea.    Social History     Tobacco Use     Smoking status: Never Smoker     Smokeless tobacco: Never Used   Substance Use Topics     Alcohol use: No     Alcohol/week: 0.0 oz     Drug use: No     PHQ 11/12/2018 11/20/2018 6/17/2019   PHQ-9 Total Score 23 26 23   Q9: Thoughts of better off dead/self-harm past 2 weeks Nearly every day Nearly every day More than half the days   F/U: Thoughts of suicide or self-harm No - -   F/U: Safety concerns No - -     JUAN-7 SCORE 11/12/2018 11/20/2018 6/17/2019   Total Score 21 21 21         Suicide Assessment Five-step Evaluation and Treatment (SAFE-T)    Current Outpatient Medications   Medication     DULoxetine (CYMBALTA) 30 MG capsule     methylPREDNISolone (MEDROL DOSEPAK) 4 MG tablet therapy pack     hydrOXYzine (VISTARIL) 25 MG capsule     ibuprofen 200 MG capsule     levonorgestrel (MIRENA) 20 MCG/24HR IUD     omeprazole (PRILOSEC) 40 MG capsule     ranitidine (ZANTAC) 150 MG tablet     No current facility-administered medications for this visit.        Patient Active Problem List   Diagnosis     Esophageal reflux     ACP (advance care planning)     Prediabetes     DUB (dysfunctional uterine bleeding)     PCO (polycystic ovaries)     Morbid obesity (H)     Anxiety     Fatty liver     JUAN (generalized anxiety disorder)     Panic attack     Social anxiety disorder     Major depressive disorder, recurrent episode, mild (H)     Past Surgical History:   Procedure Laterality Date     MYRINGOTOMY, INSERT TUBE BILATERAL, COMBINED       TONSILLECTOMY         Social History     Tobacco Use     Smoking status: Never Smoker     Smokeless tobacco: Never Used   Substance Use Topics     Alcohol use: No     Alcohol/week: 0.0 oz     Family History   Problem Relation Age of Onset     Thyroid Disease Mother      Diabetes Maternal Grandmother      Thyroid Disease Maternal Grandfather      Asthma No family hx of      Coronary  Artery Disease No family hx of      Hypertension No family hx of            Reviewed and updated as needed this visit by Provider  Allergies  Meds         Review of Systems   ROS COMP: Constitutional, HEENT, cardiovascular, pulmonary, gi and gu systems are negative, except as otherwise noted.      Objective    /80 (Patient Position: Sitting, Cuff Size: Adult Large)   Pulse 76   Temp 98.2  F (36.8  C) (Tympanic)   SpO2 99%   There is no height or weight on file to calculate BMI.  Physical Exam   GENERAL: healthy, no distress and obese  EYES: Eyes grossly normal to inspection, PERRL and conjunctivae and sclerae normal  NECK: no adenopathy, no asymmetry, masses, or scars and thyroid normal to palpation  RESP: lungs clear to auscultation - no rales, rhonchi or wheezes  CV: regular rate and rhythm, normal S1 S2, no S3 or S4, no murmur, click or rub, no peripheral edema and peripheral pulses strong  ABDOMEN: bowel sounds normal and soft; mild epigastric tenderness to palpation  MS: tenderness to palpation sternocostal junctions; full AROM UE and LE; normal  strength; normal gait  SKIN: no suspicious lesions or rashes  NEURO: Normal strength and tone, mentation intact and speech normal  PSYCH: mentation appears normal, affect normal/bright    Diagnostic Test Results:  ER records reviewed - care Everywhere        Assessment & Plan       ICD-10-CM    1. Costochondritis M94.0 PHYSICAL THERAPY REFERRAL     methylPREDNISolone (MEDROL DOSEPAK) 4 MG tablet therapy pack   2. Gastroesophageal reflux disease, esophagitis presence not specified K21.9 GENERAL SURG ADULT REFERRAL   3. Morbid obesity (H) E66.01 SLEEP EVALUATION & MANAGEMENT REFERRAL - ADULT -[facility] [place] [phone] (Age 5 and up)   4. Social anxiety disorder F40.10 DULoxetine (CYMBALTA) 30 MG capsule   5. JUAN (generalized anxiety disorder) F41.1 DULoxetine (CYMBALTA) 30 MG capsule   6. Episode of recurrent major depressive disorder,  unspecified depression episode severity (H) F33.9 DULoxetine (CYMBALTA) 30 MG capsule   7. Daily headache R51 SLEEP EVALUATION & MANAGEMENT REFERRAL - [facility] [place] [phone] (Age 5 and up)   8. Snoring R06.83 SLEEP EVALUATION & MANAGEMENT REFERRAL - [facility] [place] [phone] (Age 5 and up)   9. Fatigue, unspecified type R53.83 SLEEP EVALUATION & MANAGEMENT REFERRAL - [facility] [place] [phone] (Age 5 and up)        Multiple concerns today.  Concern for sleep apnea - body habitus, snoring, fatigue, witnessed apnea, morning headaches.  Referral for sleep study.     Mood did not respond to Effexor.  Did not like how she felt on it either.  Prefers to try something else.  Given diffuse pain, selected Cymbalta to try.      Chest wall pain -inflammation - failed NSAIDs.  Steroid taper ordered.  May contribute to GI symptoms, however.    Poor control of reflux.  No prior EGD.  Needs to lose weight and make dietary changes.  Continue PPI.  EGD referral.    Patient Instructions   Referral for sleep study.  Referral for upper endoscopy for reflux.  Start Cymbalta for mood and pain.  Complete steroid taper for chest wall pain.  PT referral as well.        Return in about 1 month (around 7/17/2019) for chronic pain, depression/anxiety.    Jenae Griffith MD  [facility] [place]

## 2019-06-17 ENCOUNTER — OFFICE VISIT (OUTPATIENT)
Dept: FAMILY MEDICINE | Facility: OTHER | Age: 24
End: 2019-06-17
Attending: FAMILY MEDICINE
Payer: COMMERCIAL

## 2019-06-17 VITALS
OXYGEN SATURATION: 99 % | SYSTOLIC BLOOD PRESSURE: 130 MMHG | HEART RATE: 76 BPM | TEMPERATURE: 98.2 F | DIASTOLIC BLOOD PRESSURE: 80 MMHG

## 2019-06-17 DIAGNOSIS — F41.1 GAD (GENERALIZED ANXIETY DISORDER): ICD-10-CM

## 2019-06-17 DIAGNOSIS — R53.83 FATIGUE, UNSPECIFIED TYPE: ICD-10-CM

## 2019-06-17 DIAGNOSIS — R51.9 DAILY HEADACHE: ICD-10-CM

## 2019-06-17 DIAGNOSIS — R06.83 SNORING: ICD-10-CM

## 2019-06-17 DIAGNOSIS — M94.0 COSTOCHONDRITIS: Primary | ICD-10-CM

## 2019-06-17 DIAGNOSIS — F33.9 EPISODE OF RECURRENT MAJOR DEPRESSIVE DISORDER, UNSPECIFIED DEPRESSION EPISODE SEVERITY (H): ICD-10-CM

## 2019-06-17 DIAGNOSIS — E66.01 MORBID OBESITY (H): ICD-10-CM

## 2019-06-17 DIAGNOSIS — F40.10 SOCIAL ANXIETY DISORDER: ICD-10-CM

## 2019-06-17 DIAGNOSIS — K21.9 GASTROESOPHAGEAL REFLUX DISEASE, ESOPHAGITIS PRESENCE NOT SPECIFIED: ICD-10-CM

## 2019-06-17 PROCEDURE — 99215 OFFICE O/P EST HI 40 MIN: CPT | Performed by: FAMILY MEDICINE

## 2019-06-17 RX ORDER — METHYLPREDNISOLONE 4 MG
TABLET, DOSE PACK ORAL
Qty: 21 TABLET | Refills: 0 | Status: SHIPPED | OUTPATIENT
Start: 2019-06-17 | End: 2021-06-17

## 2019-06-17 RX ORDER — DULOXETIN HYDROCHLORIDE 30 MG/1
30 CAPSULE, DELAYED RELEASE ORAL DAILY
Qty: 30 CAPSULE | Refills: 1 | Status: SHIPPED | OUTPATIENT
Start: 2019-06-17 | End: 2019-07-20

## 2019-06-17 ASSESSMENT — ANXIETY QUESTIONNAIRES
2. NOT BEING ABLE TO STOP OR CONTROL WORRYING: NEARLY EVERY DAY
GAD7 TOTAL SCORE: 21
7. FEELING AFRAID AS IF SOMETHING AWFUL MIGHT HAPPEN: NEARLY EVERY DAY
6. BECOMING EASILY ANNOYED OR IRRITABLE: NEARLY EVERY DAY
5. BEING SO RESTLESS THAT IT IS HARD TO SIT STILL: NEARLY EVERY DAY
3. WORRYING TOO MUCH ABOUT DIFFERENT THINGS: NEARLY EVERY DAY
1. FEELING NERVOUS, ANXIOUS, OR ON EDGE: NEARLY EVERY DAY

## 2019-06-17 ASSESSMENT — PAIN SCALES - GENERAL: PAINLEVEL: MILD PAIN (3)

## 2019-06-17 ASSESSMENT — PATIENT HEALTH QUESTIONNAIRE - PHQ9
5. POOR APPETITE OR OVEREATING: NEARLY EVERY DAY
SUM OF ALL RESPONSES TO PHQ QUESTIONS 1-9: 23

## 2019-06-17 NOTE — PATIENT INSTRUCTIONS
Referral for sleep study.  Referral for upper endoscopy for reflux.  Start Cymbalta for mood and pain.  Complete steroid taper for chest wall pain.  PT referral as well.

## 2019-06-17 NOTE — NURSING NOTE
"Chief Complaint   Patient presents with     Chest Pain       Initial /80 (Patient Position: Sitting, Cuff Size: Adult Large)   Pulse 76   Temp 98.2  F (36.8  C) (Tympanic)   SpO2 99%  Estimated body mass index is 70.97 kg/m  as calculated from the following:    Height as of 9/21/18: 1.651 m (5' 5\").    Weight as of 1/16/19: 193.5 kg (426 lb 8 oz).  Medication Reconciliation: complete      "

## 2019-06-18 ASSESSMENT — ANXIETY QUESTIONNAIRES: GAD7 TOTAL SCORE: 21

## 2019-07-20 DIAGNOSIS — F41.1 GAD (GENERALIZED ANXIETY DISORDER): ICD-10-CM

## 2019-07-20 DIAGNOSIS — F33.9 EPISODE OF RECURRENT MAJOR DEPRESSIVE DISORDER, UNSPECIFIED DEPRESSION EPISODE SEVERITY (H): ICD-10-CM

## 2019-07-20 DIAGNOSIS — F40.10 SOCIAL ANXIETY DISORDER: ICD-10-CM

## 2019-07-22 RX ORDER — DULOXETIN HYDROCHLORIDE 30 MG/1
30 CAPSULE, DELAYED RELEASE ORAL DAILY
Qty: 30 CAPSULE | Refills: 1 | Status: SHIPPED | OUTPATIENT
Start: 2019-07-22 | End: 2019-12-20
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
## 2019-12-20 ENCOUNTER — OFFICE VISIT (OUTPATIENT)
Dept: FAMILY MEDICINE | Facility: OTHER | Age: 24
End: 2019-12-20
Attending: FAMILY MEDICINE
Payer: MEDICAID

## 2019-12-20 VITALS
TEMPERATURE: 98.9 F | OXYGEN SATURATION: 100 % | HEART RATE: 77 BPM | SYSTOLIC BLOOD PRESSURE: 132 MMHG | DIASTOLIC BLOOD PRESSURE: 84 MMHG | BODY MASS INDEX: 70.97 KG/M2 | HEIGHT: 65 IN

## 2019-12-20 DIAGNOSIS — E66.01 MORBID OBESITY (H): ICD-10-CM

## 2019-12-20 DIAGNOSIS — F41.1 GAD (GENERALIZED ANXIETY DISORDER): ICD-10-CM

## 2019-12-20 DIAGNOSIS — F40.10 SOCIAL ANXIETY DISORDER: ICD-10-CM

## 2019-12-20 DIAGNOSIS — K21.9 GASTROESOPHAGEAL REFLUX DISEASE, ESOPHAGITIS PRESENCE NOT SPECIFIED: ICD-10-CM

## 2019-12-20 DIAGNOSIS — F33.9 EPISODE OF RECURRENT MAJOR DEPRESSIVE DISORDER, UNSPECIFIED DEPRESSION EPISODE SEVERITY (H): Primary | ICD-10-CM

## 2019-12-20 PROCEDURE — 99213 OFFICE O/P EST LOW 20 MIN: CPT | Performed by: FAMILY MEDICINE

## 2019-12-20 PROCEDURE — G0463 HOSPITAL OUTPT CLINIC VISIT: HCPCS

## 2019-12-20 RX ORDER — OMEPRAZOLE 40 MG/1
CAPSULE, DELAYED RELEASE ORAL
Qty: 90 CAPSULE | Refills: 3 | Status: SHIPPED | OUTPATIENT
Start: 2019-12-20 | End: 2021-01-06

## 2019-12-20 RX ORDER — DULOXETIN HYDROCHLORIDE 60 MG/1
60 CAPSULE, DELAYED RELEASE ORAL DAILY
Qty: 30 CAPSULE | Refills: 3 | Status: SHIPPED | OUTPATIENT
Start: 2019-12-20 | End: 2020-05-18

## 2019-12-20 ASSESSMENT — ANXIETY QUESTIONNAIRES
IF YOU CHECKED OFF ANY PROBLEMS ON THIS QUESTIONNAIRE, HOW DIFFICULT HAVE THESE PROBLEMS MADE IT FOR YOU TO DO YOUR WORK, TAKE CARE OF THINGS AT HOME, OR GET ALONG WITH OTHER PEOPLE: EXTREMELY DIFFICULT
6. BECOMING EASILY ANNOYED OR IRRITABLE: NEARLY EVERY DAY
3. WORRYING TOO MUCH ABOUT DIFFERENT THINGS: NEARLY EVERY DAY
1. FEELING NERVOUS, ANXIOUS, OR ON EDGE: NEARLY EVERY DAY
4. TROUBLE RELAXING: NEARLY EVERY DAY
2. NOT BEING ABLE TO STOP OR CONTROL WORRYING: NEARLY EVERY DAY
5. BEING SO RESTLESS THAT IT IS HARD TO SIT STILL: NEARLY EVERY DAY
7. FEELING AFRAID AS IF SOMETHING AWFUL MIGHT HAPPEN: NEARLY EVERY DAY
GAD7 TOTAL SCORE: 21

## 2019-12-20 ASSESSMENT — PAIN SCALES - GENERAL: PAINLEVEL: MODERATE PAIN (4)

## 2019-12-20 ASSESSMENT — PATIENT HEALTH QUESTIONNAIRE - PHQ9: SUM OF ALL RESPONSES TO PHQ QUESTIONS 1-9: 27

## 2019-12-20 NOTE — PATIENT INSTRUCTIONS
Restart Cymbalta at 60 mg daily.  New referral for counseling.  Form for medical opinion completed.  Follow up 1 month.

## 2019-12-21 ASSESSMENT — ANXIETY QUESTIONNAIRES: GAD7 TOTAL SCORE: 21

## 2020-05-11 ENCOUNTER — NURSE TRIAGE (OUTPATIENT)
Dept: FAMILY MEDICINE | Facility: OTHER | Age: 25
End: 2020-05-11

## 2020-05-11 NOTE — TELEPHONE ENCOUNTER
"Pt calling and thinks she has strep throat. Hurts to swallow. Does have white patches in the back of the throat.No fever.Runny nose.No opening in walk in clinic per care advise-UC. Would you do telephone or virtual with pt or advise UC.    Please advice.      Call back at [phone]    Additional Information    Negative: Severe difficulty breathing (e.g., struggling for each breath, speaks in single words)    Negative: Sounds like a life-threatening emergency to the triager    Negative: Throat culture results, call about    Negative: Productive cough is the main symptom    Negative: Runny nose is the main symptom    Negative: Drooling or spitting out saliva (because can't swallow)    Negative: Unable to open mouth completely    Negative: Drinking very little and has signs of dehydration (e.g., no urine > 12 hours, very dry mouth, very lightheaded)    Negative: Patient sounds very sick or weak to the triager    Negative: Difficulty breathing (per caller) but not severe    Negative: Fever > 103 F (39.4 C)    Negative: Refuses to drink anything for > 12 hours    Pus on tonsils (back of throat) and swollen neck lymph nodes ('glands')    Answer Assessment - Initial Assessment Questions  1. ONSET: \"When did the throat start hurting?\" (Hours or days ago)       A few days ago  2. SEVERITY: \"How bad is the sore throat?\" (Scale 1-10; mild, moderate or severe)    - MILD (1-3):  doesn't interfere with eating or normal activities    - MODERATE (4-7): interferes with eating some solids and normal activities    - SEVERE (8-10):  excruciating pain, interferes with most normal activities    - SEVERE DYSPHAGIA: can't swallow liquids, drooling      Moderate pain  3. STREP EXPOSURE: \"Has there been any exposure to strep within the past week?\" If so, ask: \"What type of contact occurred?\"       no  4.  VIRAL SYMPTOMS: \"Are there any symptoms of a cold, such as a runny nose, cough, hoarse voice or red eyes?\"       Runny nose  5. FEVER: " "\"Do you have a fever?\" If so, ask: \"What is your temperature, how was it measured, and when did it start?\"      No  6. PUS ON THE TONSILS: \"Is there pus on the tonsils in the back of your throat?\"      White patches  7. OTHER SYMPTOMS: \"Do you have any other symptoms?\" (e.g., difficulty breathing, headache, rash)     no  8. PREGNANCY: \"Is there any chance you are pregnant?\" \"When was your last menstrual period?\"      no    Protocols used: SORE THROAT-A-OH      "

## 2020-05-12 ENCOUNTER — HOSPITAL ENCOUNTER (EMERGENCY)
Facility: HOSPITAL | Age: 25
Discharge: HOME | End: 2020-05-12
Attending: NURSE PRACTITIONER | Admitting: NURSE PRACTITIONER
Payer: COMMERCIAL

## 2020-05-12 VITALS
RESPIRATION RATE: 16 BRPM | DIASTOLIC BLOOD PRESSURE: 99 MMHG | OXYGEN SATURATION: 97 % | SYSTOLIC BLOOD PRESSURE: 153 MMHG | TEMPERATURE: 98.5 F

## 2020-05-12 DIAGNOSIS — R03.0 ELEVATED BLOOD-PRESSURE READING WITHOUT DIAGNOSIS OF HYPERTENSION: ICD-10-CM

## 2020-05-12 DIAGNOSIS — H66.001 ACUTE SUPPURATIVE OTITIS MEDIA OF RIGHT EAR WITHOUT SPONTANEOUS RUPTURE OF TYMPANIC MEMBRANE, RECURRENCE NOT SPECIFIED: ICD-10-CM

## 2020-05-12 DIAGNOSIS — H10.33 ACUTE CONJUNCTIVITIS OF BOTH EYES: ICD-10-CM

## 2020-05-12 PROCEDURE — 99213 OFFICE O/P EST LOW 20 MIN: CPT | Mod: Z6 | Performed by: NURSE PRACTITIONER

## 2020-05-12 PROCEDURE — G0463 HOSPITAL OUTPT CLINIC VISIT: HCPCS

## 2020-05-12 RX ORDER — POLYMYXIN B SULFATE AND TRIMETHOPRIM 1; 10000 MG/ML; [USP'U]/ML
1-2 SOLUTION OPHTHALMIC EVERY 6 HOURS
Qty: 4 ML | Refills: 0 | Status: SHIPPED | OUTPATIENT
Start: 2020-05-12 | End: 2020-05-22

## 2020-05-12 RX ORDER — AMOXICILLIN 875 MG
875 TABLET ORAL 2 TIMES DAILY
Qty: 20 TABLET | Refills: 0 | Status: SHIPPED | OUTPATIENT
Start: 2020-05-12 | End: 2020-05-22

## 2020-05-12 ASSESSMENT — ENCOUNTER SYMPTOMS
HEADACHES: 0
FEVER: 0
SINUS PAIN: 0
CHILLS: 1
SINUS PRESSURE: 0
MYALGIAS: 0
DIZZINESS: 0
LIGHT-HEADEDNESS: 0
RHINORRHEA: 1
EYE DISCHARGE: 1
TROUBLE SWALLOWING: 1
EYE REDNESS: 1
ABDOMINAL PAIN: 0
VOMITING: 0
ACTIVITY CHANGE: 1
NAUSEA: 0
SORE THROAT: 1

## 2020-05-12 NOTE — ED TRIAGE NOTES
Pt states for 2 days she has had a sore throat and today she woke up with red eyes with some yellow discharge.

## 2020-05-12 NOTE — ED AVS SNAPSHOT
[facility]  [address] [address]  Phone:  [phone]                                    Karrie Oliveira   MRN: [id number]    Department:  [facility]   Date of Visit:  5/12/2020           After Visit Summary Signature Page    I have received my discharge instructions, and my questions have been answered. I have discussed any challenges I see with this plan with the nurse or doctor.    ..........................................................................................................................................  Patient/Patient Representative Signature      ..........................................................................................................................................  Patient Representative Print Name and Relationship to Patient    ..................................................               ................................................  Date                                   Time    ..........................................................................................................................................  Reviewed by Signature/Title    ...................................................              ..............................................  Date                                               Time          22EPIC Rev 08/18

## 2020-05-13 ASSESSMENT — VISUAL ACUITY: OU: 1

## 2020-05-13 ASSESSMENT — ENCOUNTER SYMPTOMS
SHORTNESS OF BREATH: 0
PHOTOPHOBIA: 0
EYE ITCHING: 0
EYE PAIN: 0
COUGH: 0

## 2020-05-13 NOTE — DISCHARGE INSTRUCTIONS
"  Polytrim eye drops as prescribed  Wash hands before and after application of the eye medication.  If applying eye drops keep the eye closed for 1 to 2 minutes after the eye drops have been applied. Do not rub the eyes.  Follow up with PCP or Ophthalmology if no improvement in 1-2 days  Return to ED/UC if symptoms increase or concerns develop such as those discussed and listed on the \"When to go the Emergency Room\" portion of your discharge instructions.     Increase fluids. Complete all antibiotics even if feeling better.  Taking antibiotics with food may decrease the symptoms, of an upset stomach, that can occur when taking antibiotics. Antibiotics frequently cause diarrhea. Probiotics or yogurt may help prevent or decrease these symptoms. Return to be reevaluated if symptoms do not improve, or worsen.    Follow-up with high blood pressure. Return to ER or call 911 if you have stroke like symptoms        "

## 2020-05-13 NOTE — ED TRIAGE NOTES
Pt is here stating she has tonsillitis  ongoing x 1 week, pt notes her boyfriend was dx with tonsillitis. Pt also here with c/o an eye infx onset this am, pt notes drainage from the right eye this am.

## 2020-05-13 NOTE — ED PROVIDER NOTES
History     Chief Complaint   Patient presents with     Redness/discharge Of Eye     Pharyngitis     HPI  Karrie Oliveira is a 24 year old female who presents with a sore throat and painful swallowing that started one week ago. This morning upon awakening she noticed bilateral red eyes with yellow drainage. This is accompanied with runny nose and chills at times. She took acetaminophen at 1800 tonight with some relief from her symptoms. He boyfriend has tonsillitis and tested negative for COVID. She is non-smoker. Denies fevers,  nausea, vomiting, diarrhea, and shortness of breath.    Allergies:  No Known Allergies    Problem List:    Patient Active Problem List    Diagnosis Date Noted     JUAN (generalized anxiety disorder) 09/28/2018     Priority: Medium     Panic attack 09/28/2018     Priority: Medium     Social anxiety disorder 09/28/2018     Priority: Medium     Major depressive disorder, recurrent episode, mild (H) 09/28/2018     Priority: Medium     Morbid obesity (H) 09/21/2018     Priority: Medium     Anxiety 09/21/2018     Priority: Medium     Fatty liver 09/21/2018     Priority: Medium     DUB (dysfunctional uterine bleeding) 01/30/2018     Priority: Medium     PCO (polycystic ovaries) 01/30/2018     Priority: Medium     Prediabetes 09/02/2016     Priority: Medium     ACP (advance care planning) 05/12/2016     Priority: Medium     Advance Care Planning 6/29/2017: ACP Review of Chart / Resources Provided:  Reviewed chart for advance care plan.  Karrie Oliveira has no plan or code status on file. Discussed available resources and provided with information.   Added by Adilia Mendez  Advance Care Planning 5/12/2016: ACP Review of Chart / Resources Provided:  Reviewed chart for advance care plan.  Karrie Oliveira has no plan or code status on file. Discussed available resources and provided with information. Confirmed code status reflects current choices pending further ACP discussions.   Confirmed/documented legally designated decision makers.  Added by Ruby Esposito             Esophageal reflux 08/18/2015     Priority: Medium        Past Medical History:    Past Medical History:   Diagnosis Date     GERD (gastroesophageal reflux disease)      Obesity      Prediabetes 9/2/2016       Past Surgical History:    Past Surgical History:   Procedure Laterality Date     MYRINGOTOMY, INSERT TUBE BILATERAL, COMBINED       TONSILLECTOMY         Family History:    Family History   Problem Relation Age of Onset     Thyroid Disease Mother      Diabetes Maternal Grandmother      Thyroid Disease Maternal Grandfather      Asthma No family hx of      Coronary Artery Disease No family hx of      Hypertension No family hx of        Social History:  Marital Status:  Single [1]  Social History     Tobacco Use     Smoking status: Never Smoker     Smokeless tobacco: Never Used   Substance Use Topics     Alcohol use: No     Alcohol/week: 0.0 standard drinks     Drug use: No        Medications:    amoxicillin (AMOXIL) 875 MG tablet  DULoxetine (CYMBALTA) 60 MG capsule  hydrOXYzine (VISTARIL) 25 MG capsule  ibuprofen 200 MG capsule  levonorgestrel (MIRENA) 20 MCG/24HR IUD  methylPREDNISolone (MEDROL DOSEPAK) 4 MG tablet therapy pack  omeprazole (PRILOSEC) 40 MG DR capsule  ranitidine (ZANTAC) 150 MG tablet  trimethoprim-polymyxin b (POLYTRIM) 32730-9.1 UNIT/ML-% ophthalmic solution          Review of Systems   Constitutional: Positive for activity change and chills. Negative for fever.   HENT: Positive for ear pain (right ear draining), rhinorrhea, sore throat and trouble swallowing. Negative for sinus pressure and sinus pain.    Eyes: Positive for discharge and redness. Negative for photophobia, pain and itching.        Sore eyes this am. Stabbing pain at times.   Respiratory: Negative for cough and shortness of breath.    Gastrointestinal: Negative for abdominal pain, nausea and vomiting.   Musculoskeletal: Negative  for myalgias.   Skin: Negative.    Neurological: Negative for dizziness, light-headedness and headaches.       Physical Exam   BP: (!) 144/104  Temp: 98.5  F (36.9  C)  Resp: 16  SpO2: 97 %      Physical Exam  Vitals signs and nursing note reviewed.   Constitutional:       General: She is in acute distress (moderate).      Appearance: She is obese.   HENT:      Head: Normocephalic.      Right Ear: Ear canal normal. Tenderness present. Tympanic membrane is bulging (slightly, purulent secretions noted behind  TM).      Left Ear: Tympanic membrane and ear canal normal.      Nose: Mucosal edema and rhinorrhea present. Rhinorrhea is clear.      Right Sinus: No maxillary sinus tenderness or frontal sinus tenderness.      Left Sinus: No maxillary sinus tenderness or frontal sinus tenderness.      Mouth/Throat:      Lips: Pink.      Mouth: Mucous membranes are moist.      Pharynx: Oropharynx is clear. Uvula midline. No posterior oropharyngeal erythema.   Eyes:      General: Lids are normal. Vision grossly intact.         Right eye: Discharge present.         Left eye: Discharge present.     Extraocular Movements: Extraocular movements intact.      Conjunctiva/sclera:      Right eye: Right conjunctiva is injected (worse than the left eye).      Left eye: Left conjunctiva is injected.      Pupils: Pupils are equal, round, and reactive to light.   Cardiovascular:      Rate and Rhythm: Regular rhythm.      Heart sounds: Normal heart sounds. No murmur.   Pulmonary:      Effort: Pulmonary effort is normal. No respiratory distress.      Breath sounds: Normal breath sounds. No wheezing.   Lymphadenopathy:      Cervical: No cervical adenopathy.   Skin:     General: Skin is warm and dry.   Neurological:      Mental Status: She is alert and oriented to person, place, and time.   Psychiatric:         Behavior: Behavior normal.         ED Course        Procedures             No results found for this or any previous visit (from the past  "24 hour(s)).    Medications - No data to display    Assessments & Plan (with Medical Decision Making)     I have reviewed the nursing notes.    I have reviewed the findings, diagnosis, plan and need for follow up with the patient.  (H10.33) Acute conjunctivitis of both eyes    (H66.001) Acute suppurative otitis media of right ear without spontaneous rupture of tympanic membrane, recurrence not specified    Comment: 24 year old female who presents with a sore throat and painful swallowing that started one week ago. This morning upon awakening she noticed bilateral red eyes with yellow drainage. This is accompanied with runny nose and chills at times. She took acetaminophen at 1800 tonight with some relief from her symptoms. He boyfriend has tonsillitis and tested negative for COVID. She is non-smoker. Denies fevers,  nausea, vomiting, diarrhea, and shortness of breath.    Assessment: severe erythema noted bilateral eyes. Right > left. Right ear pain with purulent secretions noted behind TM.     Plan: Amoxicillin bid for ten days.  Polytrim eye drops for ten days. Education provided on this/these medications. Wash hands before and after application of the eye medication.  If applying eye drops keep the eye closed for 1 to 2 minutes after the eye drops have been applied. Do not rub the eyes.  Follow up with PCP or Ophthalmology if no improvement in 1-2 days  Return to ED/UC if symptoms increase or concerns develop such as those discussed and listed on the \"When to go the Emergency Room\" portion of your discharge instructions.     Increase fluids. Complete all antibiotics even if feeling better.  Taking antibiotics with food may decrease the symptoms, of an upset stomach, that can occur when taking antibiotics. Antibiotics frequently cause diarrhea. Probiotics or yogurt may help prevent or decrease these symptoms. Return to be reevaluated if symptoms do not improve, or worsen.    (R03.0) Elevated blood-pressure reading " without diagnosis of hypertension  Comment: no history of hypertension. Initial BP  144/104. Repeat /99    Assessment: negative except for above findings    Plan: recheck BP when she is feeling better and follow-up with PCP. Follow-up with high blood pressure. Return to ER or call 911 if you have stroke like symptoms   These discharge instructions and medications were reviewed with her and understanding verbalized.    Discharge Medication List as of 5/12/2020  8:10 PM      START taking these medications    Details   amoxicillin (AMOXIL) 875 MG tablet Take 1 tablet (875 mg) by mouth 2 times daily for 10 days, Disp-20 tablet,R-0, E-Prescribe      trimethoprim-polymyxin b (POLYTRIM) 43891-2.1 UNIT/ML-% ophthalmic solution Place 1-2 drops into both eyes every 6 hours for 10 days, Disp-4 mL,R-0, E-Prescribe             Final diagnoses:   Acute conjunctivitis of both eyes   Elevated blood-pressure reading without diagnosis of hypertension   Acute suppurative otitis media of right ear without spontaneous rupture of tympanic membrane, recurrence not specified       5/12/2020   [facility]       Valeria Gonzales, CNP  05/13/20 1015

## 2020-05-14 DIAGNOSIS — F33.9 EPISODE OF RECURRENT MAJOR DEPRESSIVE DISORDER, UNSPECIFIED DEPRESSION EPISODE SEVERITY (H): ICD-10-CM

## 2020-05-14 DIAGNOSIS — F40.10 SOCIAL ANXIETY DISORDER: ICD-10-CM

## 2020-05-14 DIAGNOSIS — F41.1 GAD (GENERALIZED ANXIETY DISORDER): ICD-10-CM

## 2020-05-14 NOTE — TELEPHONE ENCOUNTER
CYMBALTA      Last Written Prescription Date:  12/20/2019  Last Fill Quantity: 30,   # refills: 3  Last Office Visit: 5/12/2020

## 2020-05-18 RX ORDER — DULOXETIN HYDROCHLORIDE 60 MG/1
60 CAPSULE, DELAYED RELEASE ORAL DAILY
Qty: 30 CAPSULE | Refills: 3 | Status: SHIPPED | OUTPATIENT
Start: 2020-05-18 | End: 2021-06-17

## 2020-07-22 ENCOUNTER — NURSE TRIAGE (OUTPATIENT)
Dept: FAMILY MEDICINE | Facility: OTHER | Age: 25
End: 2020-07-22

## 2020-07-22 ENCOUNTER — HOSPITAL ENCOUNTER (EMERGENCY)
Facility: HOSPITAL | Age: 25
Discharge: HOME | End: 2020-07-22
Attending: PHYSICIAN ASSISTANT | Admitting: PHYSICIAN ASSISTANT
Payer: COMMERCIAL

## 2020-07-22 VITALS
RESPIRATION RATE: 20 BRPM | SYSTOLIC BLOOD PRESSURE: 152 MMHG | DIASTOLIC BLOOD PRESSURE: 99 MMHG | TEMPERATURE: 98.4 F | OXYGEN SATURATION: 97 %

## 2020-07-22 DIAGNOSIS — M75.22 BICEPS TENDONITIS, LEFT: ICD-10-CM

## 2020-07-22 DIAGNOSIS — H92.02 OTALGIA, LEFT: ICD-10-CM

## 2020-07-22 PROCEDURE — 99213 OFFICE O/P EST LOW 20 MIN: CPT | Mod: Z6 | Performed by: PHYSICIAN ASSISTANT

## 2020-07-22 PROCEDURE — G0463 HOSPITAL OUTPT CLINIC VISIT: HCPCS

## 2020-07-22 RX ORDER — PREDNISONE 20 MG/1
TABLET ORAL
Qty: 10 TABLET | Refills: 0 | Status: SHIPPED | OUTPATIENT
Start: 2020-07-22 | End: 2021-06-17

## 2020-07-22 ASSESSMENT — ENCOUNTER SYMPTOMS
FEVER: 0
CHILLS: 0
FATIGUE: 0

## 2020-07-22 NOTE — ED TRIAGE NOTES
"Pt is here with c/o left ear pain   Onset last night , pt notes some drainage  No c/o of any fevers   \"I need my left shoulder checked out\"  \"my dog yanked to hard when I took her for a walk and I think she tore something\"  Onset 2.5 months ago   Pt notes pain in left shoulder comes and goes and when its there it will have a sharp shooting pain  No otc today   "

## 2020-07-22 NOTE — ED PROVIDER NOTES
History     Chief Complaint   Patient presents with     Otalgia     Shoulder Pain     HPI  Karrie Oliveira is a 24 year old female who presents to urgent care for evaluation of left ear pain and left shoulder pain.  Patient states that her ear pain started yesterday and that it sometimes radiates into her upper left jaw.  She denies any other cold symptoms, dental pain.  Patient states that approximately 2 and half months ago she has noted some pain in her left shoulder that comes and goes but seems to be getting worse lately.  Patient states she was walking her dog when she first noted this pain but otherwise denies any other mechanisms of injury.  She denies any numbness, weakness, or tingling in left upper extremity.  She states that she has tried Tylenol for pain.    Allergies:  No Known Allergies    Problem List:    Patient Active Problem List    Diagnosis Date Noted     JUAN (generalized anxiety disorder) 09/28/2018     Priority: Medium     Panic attack 09/28/2018     Priority: Medium     Social anxiety disorder 09/28/2018     Priority: Medium     Major depressive disorder, recurrent episode, mild (H) 09/28/2018     Priority: Medium     Morbid obesity (H) 09/21/2018     Priority: Medium     Anxiety 09/21/2018     Priority: Medium     Fatty liver 09/21/2018     Priority: Medium     DUB (dysfunctional uterine bleeding) 01/30/2018     Priority: Medium     PCO (polycystic ovaries) 01/30/2018     Priority: Medium     Prediabetes 09/02/2016     Priority: Medium     ACP (advance care planning) 05/12/2016     Priority: Medium     Advance Care Planning 6/29/2017: ACP Review of Chart / Resources Provided:  Reviewed chart for advance care plan.  Karrie Oliveira has no plan or code status on file. Discussed available resources and provided with information.   Added by Adilia Mendez  Advance Care Planning 5/12/2016: ACP Review of Chart / Resources Provided:  Reviewed chart for advance care plan.  Karrie Oliveira has  no plan or code status on file. Discussed available resources and provided with information. Confirmed code status reflects current choices pending further ACP discussions.  Confirmed/documented legally designated decision makers.  Added by Ruby Esposito             Esophageal reflux 08/18/2015     Priority: Medium        Past Medical History:    Past Medical History:   Diagnosis Date     GERD (gastroesophageal reflux disease)      Obesity      Prediabetes 9/2/2016       Past Surgical History:    Past Surgical History:   Procedure Laterality Date     MYRINGOTOMY, INSERT TUBE BILATERAL, COMBINED       TONSILLECTOMY         Family History:    Family History   Problem Relation Age of Onset     Thyroid Disease Mother      Diabetes Maternal Grandmother      Thyroid Disease Maternal Grandfather      Asthma No family hx of      Coronary Artery Disease No family hx of      Hypertension No family hx of        Social History:  Marital Status:  Single [1]  Social History     Tobacco Use     Smoking status: Never Smoker     Smokeless tobacco: Never Used   Substance Use Topics     Alcohol use: No     Alcohol/week: 0.0 standard drinks     Drug use: No        Medications:    predniSONE (DELTASONE) 20 MG tablet  DULoxetine (CYMBALTA) 60 MG capsule  hydrOXYzine (VISTARIL) 25 MG capsule  ibuprofen 200 MG capsule  levonorgestrel (MIRENA) 20 MCG/24HR IUD  methylPREDNISolone (MEDROL DOSEPAK) 4 MG tablet therapy pack  omeprazole (PRILOSEC) 40 MG DR capsule  ranitidine (ZANTAC) 150 MG tablet          Review of Systems   Constitutional: Negative for chills, fatigue and fever.   HENT: Positive for ear pain.    Musculoskeletal:        Left shoulder pain   All other systems reviewed and are negative.      Physical Exam   BP: 152/99  Heart Rate: 95  Temp: 98.4  F (36.9  C)  Resp: 20  SpO2: 97 %        Physical Exam  Vitals signs and nursing note reviewed.   Constitutional:       General: She is not in acute distress.     Appearance: She  is obese. She is not ill-appearing or toxic-appearing.   HENT:      Right Ear: Tympanic membrane normal.      Left Ear: Tympanic membrane normal.   Cardiovascular:      Rate and Rhythm: Regular rhythm.      Heart sounds: Normal heart sounds.   Pulmonary:      Breath sounds: Normal breath sounds.   Musculoskeletal:      Left upper arm: She exhibits tenderness. She exhibits no bony tenderness, no swelling and no edema.        Arms:       Comments: Patient has pain with palpation over insertion point of long head of bicep.  She has increased pain with external rotation of left arm.  Patient does have normal range of motion, strength 5 and 5, CMS intact.   Neurological:      Mental Status: She is alert and oriented to person, place, and time.         ED Course        Procedures              Critical Care time:               No results found for this or any previous visit (from the past 24 hour(s)).    Medications - No data to display    Assessments & Plan (with Medical Decision Making)   #1.  Otalgia, left  #2.  Tendinitis, left bicep    Discussed exam findings with patient.  We discussed that patient should try over-the-counter antihistamine for ear pain.  Patient was prescribed prednisone burst for bicep tendinitis.  We also discussed icing, light range of motion and stretching exercises.  We also discussed appropriate dosing of Tylenol and ibuprofen.  If her symptoms do not improve I would like her to follow-up with her primary care provider.  Any further concerns please return to urgent care or follow-up with primary care provider.  Patient verbalizes understanding and agreement of plan.      I have reviewed the nursing notes.    I have reviewed the findings, diagnosis, plan and need for follow up with the patient.      Discharge Medication List as of 7/22/2020  3:20 PM      START taking these medications    Details   predniSONE (DELTASONE) 20 MG tablet Take two tablets (= 40mg) each day for 5 (five) days, Disp-10  tablet,R-0, E-Prescribe             Final diagnoses:   Biceps tendonitis, left   Otalgia, left       7/22/2020   [facility]     Edwin Orozco PA-C  07/22/20 [id number]

## 2020-07-22 NOTE — TELEPHONE ENCOUNTER
Patient states she has been experiencing shoulder pain that radiated down left arm for the past couple months. Patient states pain was present and sever last night. Patient states she is now experiencing both left ear pain and jaw pain. Patient denies any SOB, or chest pain.  Patient denies any current nausea but states she has been experiencing nausea intermittently throughout the last week. Patient advised to be seen in ED to rule out any cardiac issues and to call clinic back to make follow up appointment. Patient verbalized understanding.

## 2020-07-22 NOTE — ED AVS SNAPSHOT
[facility]  [address] [address]  Phone:  [phone]                                    Karrie Oliveira   MRN: [id number]    Department:  [facility]   Date of Visit:  7/22/2020           After Visit Summary Signature Page    I have received my discharge instructions, and my questions have been answered. I have discussed any challenges I see with this plan with the nurse or doctor.    ..........................................................................................................................................  Patient/Patient Representative Signature      ..........................................................................................................................................  Patient Representative Print Name and Relationship to Patient    ..................................................               ................................................  Date                                   Time    ..........................................................................................................................................  Reviewed by Signature/Title    ...................................................              ..............................................  Date                                               Time          22EPIC Rev 08/18

## 2020-07-22 NOTE — ED TRIAGE NOTES
Patient states she started having some left ear pain last night and it's still bothering her today. She states since she's here she'd like her left shoulder looked at as well since it's been bothering her of and on for months now after her dog pulled her.

## 2021-01-04 DIAGNOSIS — K21.9 GASTROESOPHAGEAL REFLUX DISEASE, UNSPECIFIED WHETHER ESOPHAGITIS PRESENT: ICD-10-CM

## 2021-01-06 RX ORDER — OMEPRAZOLE 40 MG/1
CAPSULE, DELAYED RELEASE ORAL
Qty: 90 CAPSULE | Refills: 0 | Status: SHIPPED | OUTPATIENT
Start: 2021-01-06 | End: 2021-05-14

## 2021-01-06 NOTE — TELEPHONE ENCOUNTER
Prilosec  Last Written Prescription Date:12.20.19    Last Fill Quantity: 90,   # refills: 3  Last Office Visit: 12.20.19  Future Office visit:       Routing refill request to provider for review/approval because:  Pt is due for visit. No appt at this time.      Stormy Contreras RN

## 2021-04-06 NOTE — TELEPHONE ENCOUNTER
Failed protocol due to PHQ9 and BP  BP Readings from Last 3 Encounters:   05/12/20 153/99   12/20/19 132/84   06/17/19 130/80      PHQ-9 score:    PHQ 12/20/2019   PHQ-9 Total Score 27   Q9: Thoughts of better off dead/self-harm past 2 weeks Nearly every day   F/U: Thoughts of suicide or self-harm Yes   F/U: Self harm-plan No   F/U: Self-harm action No   F/U: Safety concerns No               No Repair - Repaired With Adjacent Surgical Defect Text (Leave Blank If You Do Not Want): After obtaining clear surgical margins the defect was repaired concurrently with another surgical defect which was in close approximation.

## 2021-05-12 ENCOUNTER — TRANSFERRED RECORDS (OUTPATIENT)
Dept: HEALTH INFORMATION MANAGEMENT | Facility: CLINIC | Age: 26
End: 2021-05-12

## 2021-05-13 ENCOUNTER — HOSPITAL ENCOUNTER (EMERGENCY)
Facility: HOSPITAL | Age: 26
Discharge: HOME | End: 2021-05-13
Attending: NURSE PRACTITIONER | Admitting: NURSE PRACTITIONER
Payer: COMMERCIAL

## 2021-05-13 VITALS
TEMPERATURE: 98 F | HEART RATE: 81 BPM | SYSTOLIC BLOOD PRESSURE: 153 MMHG | DIASTOLIC BLOOD PRESSURE: 90 MMHG | RESPIRATION RATE: 18 BRPM | OXYGEN SATURATION: 98 %

## 2021-05-13 DIAGNOSIS — K21.9 GASTROESOPHAGEAL REFLUX DISEASE, UNSPECIFIED WHETHER ESOPHAGITIS PRESENT: ICD-10-CM

## 2021-05-13 DIAGNOSIS — R11.2 NAUSEA AND VOMITING: Primary | ICD-10-CM

## 2021-05-13 DIAGNOSIS — J06.9 VIRAL URI WITH COUGH: ICD-10-CM

## 2021-05-13 DIAGNOSIS — R11.2 NAUSEA AND VOMITING, INTRACTABILITY OF VOMITING NOT SPECIFIED, UNSPECIFIED VOMITING TYPE: ICD-10-CM

## 2021-05-13 LAB
LABORATORY COMMENT REPORT: NORMAL
SARS-COV-2 RNA RESP QL NAA+PROBE: NEGATIVE
SPECIMEN SOURCE: NORMAL

## 2021-05-13 PROCEDURE — 99213 OFFICE O/P EST LOW 20 MIN: CPT | Performed by: NURSE PRACTITIONER

## 2021-05-13 PROCEDURE — U0005 INFEC AGEN DETEC AMPLI PROBE: HCPCS | Performed by: NURSE PRACTITIONER

## 2021-05-13 PROCEDURE — G0463 HOSPITAL OUTPT CLINIC VISIT: HCPCS

## 2021-05-13 PROCEDURE — U0003 INFECTIOUS AGENT DETECTION BY NUCLEIC ACID (DNA OR RNA); SEVERE ACUTE RESPIRATORY SYNDROME CORONAVIRUS 2 (SARS-COV-2) (CORONAVIRUS DISEASE [COVID-19]), AMPLIFIED PROBE TECHNIQUE, MAKING USE OF HIGH THROUGHPUT TECHNOLOGIES AS DESCRIBED BY CMS-2020-01-R: HCPCS | Performed by: NURSE PRACTITIONER

## 2021-05-13 PROCEDURE — C9803 HOPD COVID-19 SPEC COLLECT: HCPCS

## 2021-05-13 RX ORDER — ONDANSETRON 4 MG/1
4 TABLET, ORALLY DISINTEGRATING ORAL EVERY 8 HOURS PRN
Qty: 12 TABLET | Refills: 0 | Status: SHIPPED | OUTPATIENT
Start: 2021-05-13 | End: 2021-09-14

## 2021-05-13 ASSESSMENT — ENCOUNTER SYMPTOMS
EYE REDNESS: 0
SORE THROAT: 1
FEVER: 0
RHINORRHEA: 1
EYE ITCHING: 0
MYALGIAS: 1
VOMITING: 1
CHILLS: 1
COUGH: 1
NAUSEA: 1
SINUS PAIN: 0
PSYCHIATRIC NEGATIVE: 1
EYE PAIN: 0
DIARRHEA: 1
SHORTNESS OF BREATH: 1
FATIGUE: 1
TROUBLE SWALLOWING: 0
SINUS PRESSURE: 0
HEADACHES: 1

## 2021-05-13 NOTE — ED TRIAGE NOTES
Patient states that she's been sick for awhile and requesting a COVID test. States cough, headache, upset stomach, and SOB

## 2021-05-13 NOTE — DISCHARGE INSTRUCTIONS
COVID TEST PENDING-WILL NOTIFY YOU OF RESULTS    Zofran for nausea/vomiting    Symptomatic treatments recommended.  -Discussed that antibiotics would not help symptoms of viral URI. Education provided on symptoms of secondary bacterial infection such as new fever, chills, rigors, shortness of breath, increased work of breathing, that can occur with viral URI and need for further evaluation, if they occur.   - Ensure you are staying hydrated by drinking plenty of fluids or eating foods such as popsicles, jello, pudding.  - Honey can be soothing for sore throat  - Warm salt water gurgles can help soothe sore throat  - Rest  - Humidifier can help with congestion and help keep mucus membranes such as throat and nose from drying out.  - Sleeping slightly propped up can help with congestion and postnasal drainage that can worsen cough at bedtime.  - As long as you have never been told to take Tylenol and/or Ibuprofen you can use them to manage fever and body aches per package instructions  Make sure you eat when you take ibuprofen to avoid stomach upset.  - OTC cough medications per package instructions to help with cough. Check to see if the cough/cold medication already has acetaminophen (Tylenol) in it. If it does avoid taking additional Tylenol.  - If sudden onset of new fever, worsening symptoms return for further evaluation.  - OTC nasal steroid such as Flonase can help decrease sinus inflammation to help with congestion.  - Education provided on symptoms of post-viral bacterial infections including ear infection and pneumonia. This would require re-evaluation for treatment.    Follow up with primary care provider or return to urgent care/ED with any worsening in condition or additional concerns.

## 2021-05-13 NOTE — ED PROVIDER NOTES
History     Chief Complaint   Patient presents with     Covid 19 Testing     HPI  Karrie Oliveira is a 25 year old female who presents to urgent care today (ambulatory) for complaints of fatigue, chills, congestion, ear pain, rhinorrhea, sore throat, cough, SOB diarrhea, nausea, vomiting, myalgia and headaches.  Able to stay hydrated.  No history of asthma.  Nonsmoker.  No seasonal allergies.  Wants COVID test.  No known sick contact.  Does not work outside the home.  Ibuprofen for discomfort.  No other concerns.      Allergies:  No Known Allergies    Problem List:    Patient Active Problem List    Diagnosis Date Noted     JUAN (generalized anxiety disorder) 09/28/2018     Priority: Medium     Panic attack 09/28/2018     Priority: Medium     Social anxiety disorder 09/28/2018     Priority: Medium     Major depressive disorder, recurrent episode, mild (H) 09/28/2018     Priority: Medium     Morbid obesity (H) 09/21/2018     Priority: Medium     Anxiety 09/21/2018     Priority: Medium     Fatty liver 09/21/2018     Priority: Medium     DUB (dysfunctional uterine bleeding) 01/30/2018     Priority: Medium     PCO (polycystic ovaries) 01/30/2018     Priority: Medium     Prediabetes 09/02/2016     Priority: Medium     ACP (advance care planning) 05/12/2016     Priority: Medium     Advance Care Planning 6/29/2017: ACP Review of Chart / Resources Provided:  Reviewed chart for advance care plan.  Karrie Oliveira has no plan or code status on file. Discussed available resources and provided with information.   Added by Adilia Mendez  Advance Care Planning 5/12/2016: ACP Review of Chart / Resources Provided:  Reviewed chart for advance care plan.  Karrie Oliveira has no plan or code status on file. Discussed available resources and provided with information. Confirmed code status reflects current choices pending further ACP discussions.  Confirmed/documented legally designated decision makers.  Added by Ruby MARADIAGA  Vaibhav             Esophageal reflux 08/18/2015     Priority: Medium        Past Medical History:    Past Medical History:   Diagnosis Date     GERD (gastroesophageal reflux disease)      Obesity      Prediabetes 9/2/2016       Past Surgical History:    Past Surgical History:   Procedure Laterality Date     MYRINGOTOMY, INSERT TUBE BILATERAL, COMBINED       TONSILLECTOMY         Family History:    Family History   Problem Relation Age of Onset     Thyroid Disease Mother      Diabetes Maternal Grandmother      Thyroid Disease Maternal Grandfather      Asthma No family hx of      Coronary Artery Disease No family hx of      Hypertension No family hx of        Social History:  Marital Status:  Single [1]  Social History     Tobacco Use     Smoking status: Never Smoker     Smokeless tobacco: Never Used   Substance Use Topics     Alcohol use: No     Alcohol/week: 0.0 standard drinks     Drug use: No        Medications:    ondansetron (ZOFRAN-ODT) 4 MG ODT tab  DULoxetine (CYMBALTA) 60 MG capsule  hydrOXYzine (VISTARIL) 25 MG capsule  ibuprofen 200 MG capsule  levonorgestrel (MIRENA) 20 MCG/24HR IUD  methylPREDNISolone (MEDROL DOSEPAK) 4 MG tablet therapy pack  omeprazole (PRILOSEC) 40 MG DR capsule  predniSONE (DELTASONE) 20 MG tablet  ranitidine (ZANTAC) 150 MG tablet      Review of Systems   Constitutional: Positive for chills and fatigue. Negative for fever.   HENT: Positive for congestion, ear pain, rhinorrhea and sore throat. Negative for sinus pressure, sinus pain and trouble swallowing.    Eyes: Negative for pain, redness and itching.   Respiratory: Positive for cough and shortness of breath.    Cardiovascular: Negative for chest pain.   Gastrointestinal: Positive for diarrhea, nausea and vomiting (twice).   Musculoskeletal: Positive for myalgias.   Skin: Negative for rash.   Neurological: Positive for headaches.   Psychiatric/Behavioral: Negative.      Physical Exam   BP: 153/90  Pulse: 81  Temp: 98  F (36.7   C)  Resp: 18  SpO2: 98 %    Physical Exam  Vitals signs and nursing note reviewed.   Constitutional:       General: She is not in acute distress.     Appearance: She is not ill-appearing.   HENT:      Head: Normocephalic.      Right Ear: Tympanic membrane, ear canal and external ear normal.      Left Ear: Tympanic membrane, ear canal and external ear normal.      Nose: Congestion and rhinorrhea present.      Mouth/Throat:      Mouth: Mucous membranes are moist.      Pharynx: Oropharynx is clear. No posterior oropharyngeal erythema.   Eyes:      Extraocular Movements: Extraocular movements intact.      Conjunctiva/sclera: Conjunctivae normal.      Pupils: Pupils are equal, round, and reactive to light.   Neck:      Musculoskeletal: Normal range of motion and neck supple.   Cardiovascular:      Rate and Rhythm: Normal rate and regular rhythm.      Pulses: Normal pulses.      Heart sounds: Normal heart sounds.   Pulmonary:      Effort: Pulmonary effort is normal.      Breath sounds: Normal breath sounds.   Abdominal:      General: Bowel sounds are normal.      Palpations: Abdomen is soft.   Lymphadenopathy:      Cervical: Cervical adenopathy present.   Neurological:      Mental Status: She is alert.       ED Course     Results for orders placed or performed during the hospital encounter of 05/13/21 (from the past 24 hour(s))   Symptomatic SARS-CoV-2 COVID-19 Virus (Coronavirus) by PCR    Specimen: Nasopharyngeal   Result Value Ref Range    SARS-CoV-2 Virus Specimen Source Nasopharyngeal     SARS-CoV-2 PCR Result NEGATIVE     SARS-CoV-2 PCR Comment       Testing was performed using the Xpert Xpress SARS-CoV-2 Assay on the Cepheid Gene-Xpert   Instrument Systems. Additional information about this Emergency Use Authorization (EUA)   assay can be found via the Lab Guide.         Medications - No data to display    Assessments & Plan (with Medical Decision Making)     I have reviewed the nursing notes.    I have reviewed  the findings, diagnosis, plan and need for follow up with the patient.  (R11.2) Nausea and vomiting  (primary encounter diagnosis)  Plan: COVID-19 [facility] Referral  Zofran as needed for nausea.     (J06.9) Viral URI with cough  Plan: COVID-19 [facility] Referral   COVID Test pending  Symptomatic treatments recommended.  -Discussed that antibiotics would not help symptoms of viral URI. Education provided on symptoms of secondary bacterial infection such as new fever, chills, rigors, shortness of breath, increased work of breathing, that can occur with viral URI and need for further evaluation, if they occur.   - Ensure you are staying hydrated by drinking plenty of fluids or eating foods such as popsicles, jello, pudding.  - Honey can be soothing for sore throat  - Warm salt water gurgles can help soothe sore throat  - Rest  - Humidifier can help with congestion and help keep mucus membranes such as throat and nose from drying out.  - Sleeping slightly propped up can help with congestion and postnasal drainage that can worsen cough at bedtime.  - As long as you have never been told to take Tylenol and/or Ibuprofen you can use them to manage fever and body aches per package instructions  Make sure you eat when you take ibuprofen to avoid stomach upset.  - OTC cough medications per package instructions to help with cough. Check to see if the cough/cold medication already has acetaminophen (Tylenol) in it. If it does avoid taking additional Tylenol.  - If sudden onset of new fever, worsening symptoms return for further evaluation.  - OTC nasal steroid such as Flonase can help decrease sinus inflammation to help with congestion.  - Education provided on symptoms of post-viral bacterial infections including ear infection and pneumonia. This would require re-evaluation for treatment.    Follow up with primary care provider or return to urgent care/ED with any worsening in condition or additional concerns.      Discharge Medication List as of 5/13/2021  5:19 PM      START taking these medications    Details   ondansetron (ZOFRAN-ODT) 4 MG ODT tab Take 1 tablet (4 mg) by mouth every 8 hours as needed for nausea, Disp-12 tablet, R-0, E-Prescribe           Final diagnoses:   Nausea and vomiting   Viral URI with cough     5/13/2021   [facility]     Helga Esposito NP  05/14/21 0620

## 2021-05-13 NOTE — ED TRIAGE NOTES
Pt presented with nausea, vomiting, diarrhea, SOB, chest pain, major headaches, light headedness, and dizziness. Symptoms started a week ago and progressed. Pt has taken tylenol and ibuprofen.

## 2021-05-14 RX ORDER — OMEPRAZOLE 40 MG/1
CAPSULE, DELAYED RELEASE ORAL
Qty: 90 CAPSULE | Refills: 0 | Status: SHIPPED | OUTPATIENT
Start: 2021-05-14 | End: 2021-10-20

## 2021-05-14 NOTE — TELEPHONE ENCOUNTER
Omeprazole (Prilosec) 40 MG DR capsule      Last Written Prescription Date:  1/6/21  Last Fill Quantity: 90,   # refills: 0  Last Office Visit: 1/29/20  Future Office visit:

## 2021-05-18 ENCOUNTER — NURSE TRIAGE (OUTPATIENT)
Dept: NURSING | Facility: CLINIC | Age: 26
End: 2021-05-18

## 2021-05-18 NOTE — TELEPHONE ENCOUNTER
Karrie went to ED a few days ago and is calling for covid results.       Coronavirus (COVID-19) Notification    Lab Result   Lab test 2019-nCoV rRt-PCR OR SARS-COV-2 PCR    Nasopharyngeal AND/OR Oropharyngeal swab is NEGATIVE for 2019-nCoV RNA [OR] SARS-COV-2 RNA (COVID-19) RNA    Your result was negative. This means that we didn't find the virus that causes COVID-19 in your sample. A test may show negative when you do actually have the virus. This can happen when the virus is in the early stages of infection, before you feel illness symptoms.    If you have symptoms   Stay home and away from others (self-isolate) until you meet ALL of the guidelines below:    You've had no fever--and no medicine that reduces fever--for 1 full day (24 hours). And      Your other symptoms have gotten better. For example, your cough or breathing has improved. And   ; At least 10 days have passed since your symptoms started. (If you've been told by a doctor that you have a weak immune system, wait 20 days.)         During this time:    Stay home. Don't go to work, school or anywhere else.     Stay in your own room, including for meals. Use your own bathroom if you can.    Stay away from others in your home. No hugging, kissing or shaking hands. No visitors.    Clean  high touch  surfaces often (doorknobs, counters, handles, etc.). Use a household cleaning spray or wipes. You can find a full list on the EPA website at www.epa.gov/pesticide-registration/list-n-disinfectants-use-against-sars-cov-2.    Cover your mouth and nose with a mask, tissue or other face covering to avoid spreading germs.    Wash your hands and face often with soap and water.    Going back to work  Check with your employer for any guidelines to follow for going back to work.  You are sent a letter for your Employer which will serve as formal document notice that you, the employee, tested negative for COVID-19, as of the testing date shown above.    If your symptoms  worsen or other concerning symptoms, contact PCP, oncare or consider returning to Emergency Dept.    Where can I get more information?    [facility]: [email or web address]    Coronavirus Basics: [email or web address]/diseases/coronavirus/basics.html    [facility] Hotline (343-740-8918)    Shabana Lopez RN    Reason for Disposition    COVID-19 Testing, questions about    Additional Information    Negative: SEVERE difficulty breathing (e.g., struggling for each breath, speaks in single words)    Negative: Difficult to awaken or acting confused (e.g., disoriented, slurred speech)    Negative: Bluish (or gray) lips or face now    Negative: Shock suspected (e.g., cold/pale/clammy skin, too weak to stand, low BP, rapid pulse)    Negative: Sounds like a life-threatening emergency to the triager    Negative: SEVERE or constant chest pain or pressure (Exception: mild central chest pain, present only when coughing)    Negative: MODERATE difficulty breathing (e.g., speaks in phrases, SOB even at rest, pulse 100-120)    Negative: [1] Headache AND [2] stiff neck (can't touch chin to chest)    Negative: MILD difficulty breathing (e.g., minimal/no SOB at rest, SOB with walking, pulse <100)    Negative: Chest pain or pressure    Negative: Patient sounds very sick or weak to the triager    Negative: Fever > 103 F (39.4 C)    Negative: [1] Fever > 101 F (38.3 C) AND [2] age > 60 years    Negative: [1] Fever > 100.0 F (37.8 C) AND [2] bedridden (e.g., nursing home patient, CVA, chronic illness, recovering from surgery)    Negative: [1] HIGH RISK patient (e.g., age > 64 years, diabetes, heart or lung disease, weak immune system) AND [2] new or worsening symptoms    Negative: [1] HIGH RISK patient AND [2] influenza is widespread in the community AND [3] ONE OR MORE respiratory symptoms: cough, sore throat, runny or stuffy nose    Negative: [1] HIGH RISK patient AND [2] influenza exposure within the last 7 days AND [3] ONE  OR MORE respiratory symptoms: cough, sore throat, runny or stuffy nose    Negative: Fever present > 3 days (72 hours)    Negative: [1] Fever returns after gone for over 24 hours AND [2] symptoms worse or not improved    Negative: [1] Continuous (nonstop) coughing interferes with work or school AND [2] no improvement using cough treatment per protocol    Negative: [1] COVID-19 infection suspected by caller or triager AND [2] mild symptoms (cough, fever, or others) AND [3] no complications or SOB    Negative: Cough present > 3 weeks    Negative: [1] COVID-19 diagnosed by positive lab test AND [2] NO symptoms (e.g., cough, fever, others)    Negative: [1] COVID-19 diagnosed by positive lab test AND [2] mild symptoms (e.g., cough, fever, others) AND [3] no complications or SOB    Negative: [1] COVID-19 diagnosed by HCP (doctor, NP or PA) AND [2] mild symptoms (e.g., cough, fever, others) AND [3] no complications or SOB    Negative: [1] COVID-19 diagnosed AND [2] has mild nausea, vomiting or diarrhea    Negative: COVID-19 Home Isolation, questions about    Protocols used: CORONAVIRUS (COVID-19) DIAGNOSED OR [id number] 3.25

## 2021-06-17 ENCOUNTER — APPOINTMENT (OUTPATIENT)
Dept: CT IMAGING | Facility: HOSPITAL | Age: 26
End: 2021-06-17
Attending: EMERGENCY MEDICINE
Payer: COMMERCIAL

## 2021-06-17 ENCOUNTER — HOSPITAL ENCOUNTER (EMERGENCY)
Facility: HOSPITAL | Age: 26
Discharge: HOME | End: 2021-06-17
Attending: EMERGENCY MEDICINE | Admitting: EMERGENCY MEDICINE
Payer: COMMERCIAL

## 2021-06-17 VITALS
SYSTOLIC BLOOD PRESSURE: 140 MMHG | OXYGEN SATURATION: 97 % | DIASTOLIC BLOOD PRESSURE: 90 MMHG | TEMPERATURE: 98.9 F | RESPIRATION RATE: 20 BRPM | HEART RATE: 89 BPM

## 2021-06-17 DIAGNOSIS — S06.0X9A CONCUSSION WITH LOSS OF CONSCIOUSNESS, INITIAL ENCOUNTER: ICD-10-CM

## 2021-06-17 DIAGNOSIS — S01.81XA LACERATION OF FOREHEAD, INITIAL ENCOUNTER: ICD-10-CM

## 2021-06-17 DIAGNOSIS — S16.1XXA STRAIN OF NECK MUSCLE, INITIAL ENCOUNTER: ICD-10-CM

## 2021-06-17 LAB — HCG UR QL: NEGATIVE

## 2021-06-17 PROCEDURE — 81025 URINE PREGNANCY TEST: CPT | Performed by: EMERGENCY MEDICINE

## 2021-06-17 PROCEDURE — 99284 EMERGENCY DEPT VISIT MOD MDM: CPT | Mod: 25

## 2021-06-17 PROCEDURE — 12013 RPR F/E/E/N/L/M 2.6-5.0 CM: CPT

## 2021-06-17 PROCEDURE — 72125 CT NECK SPINE W/O DYE: CPT

## 2021-06-17 PROCEDURE — 70450 CT HEAD/BRAIN W/O DYE: CPT

## 2021-06-17 PROCEDURE — 250N000011 HC RX IP 250 OP 636: Performed by: EMERGENCY MEDICINE

## 2021-06-17 PROCEDURE — 12013 RPR F/E/E/N/L/M 2.6-5.0 CM: CPT | Performed by: EMERGENCY MEDICINE

## 2021-06-17 PROCEDURE — 250N000009 HC RX 250: Performed by: EMERGENCY MEDICINE

## 2021-06-17 PROCEDURE — 99283 EMERGENCY DEPT VISIT LOW MDM: CPT | Mod: 25 | Performed by: EMERGENCY MEDICINE

## 2021-06-17 RX ORDER — METHOCARBAMOL 500 MG/1
500 TABLET, FILM COATED ORAL 4 TIMES DAILY PRN
Qty: 20 TABLET | Refills: 0 | Status: SHIPPED | OUTPATIENT
Start: 2021-06-17 | End: 2021-09-14

## 2021-06-17 RX ADMIN — EPINEPHRINE BITARTRATE 5 ML: 1 POWDER at 13:16

## 2021-06-17 NOTE — ED PROVIDER NOTES
EMERGENCY DEPARTMENT ENCOUNTER      NAME: Karrie Oliveira  AGE: 25 year old female  YOB: 1995  MRN: [id number]  EVALUATION DATE & TIME: 6/17/2021 12:41 PM    PCP: Jenae Hayward    ED PROVIDER: Jacob Duran D.O.      Chief Complaint   Patient presents with     Head Injury         HPI    Patient information was obtained from: patient      Karrie Oliveira is a 25 year old female who presents to the emergency department with complaint of mechanical fall resulting in hitting her head and brief loss of consciousness.  The fall was unwitnessed.  She reports that she has some neck discomfort and was placed in a c-collar upon arrival to the emergency department.  He does report a laceration to her forehead.  She denies any current headache, numbness, tingling, weakness, cough, congestion, sore throat, chest pain, shortness of breath, nausea, vomiting, diarrhea, dysuria, hematuria.      REVIEW OF SYSTEMS  Constitutional:  Denies fever, chills, weight loss or weakness  Eyes:  No pain, discharge, redness  HENT:  Denies sore throat, ear pain, congestion  Respiratory: No SOB, wheeze or cough  Cardiovascular:  No CP, palpitations  GI:  Denies abdominal pain, nausea, vomiting, diarrhea  : Denies dysuria, hematuria  Musculoskeletal:  Denies any new muscle/joint pain, swelling or loss of function.  Skin:  Denies rash, pallor, reports forehead laceration  Neurologic:  Denies headache, focal weakness or sensory changes  Lymph: Denies swollen nodes    All other systems negative unless noted in HPI.    PAST MEDICAL HISTORY:  Past Medical History:   Diagnosis Date     GERD (gastroesophageal reflux disease)      Obesity      Prediabetes 9/2/2016       PAST SURGICAL HISTORY:  Past Surgical History:   Procedure Laterality Date     MYRINGOTOMY, INSERT TUBE BILATERAL, COMBINED       TONSILLECTOMY           CURRENT MEDICATIONS:    No current facility-administered medications for this encounter.      Current Outpatient  Medications   Medication     ibuprofen 200 MG capsule     levonorgestrel (MIRENA) 20 MCG/24HR IUD     methocarbamol (ROBAXIN) 500 MG tablet     omeprazole (PRILOSEC) 40 MG DR capsule     ondansetron (ZOFRAN-ODT) 4 MG ODT tab     ranitidine (ZANTAC) 150 MG tablet         ALLERGIES:  No Known Allergies    FAMILY HISTORY:  Family History   Problem Relation Age of Onset     Thyroid Disease Mother      Diabetes Maternal Grandmother      Thyroid Disease Maternal Grandfather      Asthma No family hx of      Coronary Artery Disease No family hx of      Hypertension No family hx of        SOCIAL HISTORY:  Social History     Socioeconomic History     Marital status: Single     Spouse name: Not on file     Number of children: Not on file     Years of education: Not on file     Highest education level: Not on file   Occupational History     Not on file   Social Needs     Financial resource strain: Not on file     Food insecurity     Worry: Not on file     Inability: Not on file     Transportation needs     Medical: Not on file     Non-medical: Not on file   Tobacco Use     Smoking status: Never Smoker     Smokeless tobacco: Never Used   Substance and Sexual Activity     Alcohol use: No     Alcohol/week: 0.0 standard drinks     Drug use: No     Sexual activity: Yes     Partners: Male   Lifestyle     Physical activity     Days per week: Not on file     Minutes per session: Not on file     Stress: Not on file   Relationships     Social connections     Talks on phone: Not on file     Gets together: Not on file     Attends [employer] service: Not on file     Active member of club or organization: Not on file     Attends meetings of clubs or organizations: Not on file     Relationship status: Not on file     Intimate partner violence     Fear of current or ex partner: Not on file     Emotionally abused: Not on file     Physically abused: Not on file     Forced sexual activity: Not on file   Other Topics Concern      Service No      Blood Transfusions Yes     Comment: Permits if needed     Caffeine Concern Yes     Comment: none     Occupational Exposure Not Asked     Hobby Hazards Not Asked     Sleep Concern Not Asked     Stress Concern Not Asked     Weight Concern Not Asked     Special Diet Not Asked     Back Care Not Asked     Exercise Not Asked     Bike Helmet Not Asked     Seat Belt Not Asked     Self-Exams Not Asked     Parent/sibling w/ CABG, MI or angioplasty before 65F 55M? No   Social History Narrative    5/15: Karrie lives with her boyfriend.  No children.  Not working and not in school.        VITALS:  Patient Vitals for the past 24 hrs:   BP Temp Temp src Pulse Resp SpO2   06/17/21 1320 (!) 155/102 -- -- 90 -- --   06/17/21 1131 141/93 99.9  F (37.7  C) Tympanic 104 16 97 %       PHYSICAL EXAM    VITAL SIGNS: BP (!) 155/102   Pulse 90   Temp 99.9  F (37.7  C) (Tympanic)   Resp 16   SpO2 97%     General Appearance: Well-appearing, well-nourished, no acute distress   Head:  Normocephalic, without obvious abnormality, 3cm forehead laceration  Eyes:  PERRL, conjunctiva/corneas clear, EOM's intact,  ENT:  Lips, mucosa, and tongue normal, membranes are moist without pallor  Neck:  Normal ROM, symmetrical, trachea midline, bilateral cervical paraspinal tenderness    Cardio:  Regular rate and rhythm, no murmur, rub or gallop, 2+ pulses symmetric in all extremities  Pulm:  Clear to auscultation bilaterally, respirations unlabored,  Abdomen:  Soft, non-tender, no rebound or guarding.  Musculoskeletal: Full ROM, no edema, no cyanosis, good ROM of major joints  Integument:  Warm, Dry, No erythema, No rash.    Neurologic:  Alert & oriented.  No focal deficits appreciated.  Ambulatory.  Psychiatric:  Affect normal, Judgment normal, Mood normal.      LABS  Results for orders placed or performed during the hospital encounter of 06/17/21 (from the past 24 hour(s))   HCG qualitative urine   Result Value Ref Range    HCG Qual Urine Negative  NEG^Negative   Head CT w/o contrast    Narrative    PROCEDURE: CT HEAD W/O CONTRAST     HISTORY: Trauma - Head Injury.    COMPARISON: None.    TECHNIQUE:  Helical images of the head from the foramen magnum to the  vertex were obtained without contrast.    FINDINGS: The ventricles and sulci are normal in volume. No acute  intracranial hemorrhage, mass effect, midline shift, hydrocephalus or  basilar cystern effacement are present.    The grey-white matter interface is preserved.    The calvarium is intact. The mastoid air cells are clear.  The  visualized paranasal sinuses are clear.      Impression    IMPRESSION: No acute intracranial hemorrhage.      ROXANNA BUTTS MD   Cervical spine CT w/o contrast    Narrative    PROCEDURE: CT CERVICAL SPINE W/O CONTRAST     HISTORY: Neck trauma, dangerous injury mechanism (Age 16-64y); Trauma.    TECHNIQUE: Helical noncontrast CT images of the cervical spine.    COMPARISON: None.    FINDINGS:     No acute fracture is identified. The vertebral bodies are normal in  height. The cervical lordosis is straightened. The C1-2 articulation  and the craniocervical junction are intact.     The intervertebral disk spaces are maintained. The central spinal  canal and neural foramina are patent.      The paravertebral soft tissues are notable for scattered borderline  enlarged cervical lymph nodes. The lung apices are clear.      Impression    IMPRESSION: No evidence of acute cervical spine fracture.    Mild cervical adenopathy is most likely reactive in a younger patient.  Correlate with physical exam and consider follow-up.    ROXANNA BUTTS MD         RADIOLOGY  Cervical spine CT w/o contrast   Final Result   IMPRESSION: No evidence of acute cervical spine fracture.      Mild cervical adenopathy is most likely reactive in a younger patient.   Correlate with physical exam and consider follow-up.      ROXANNA BUTTS MD      Head CT w/o contrast   Final Result   IMPRESSION:  No acute intracranial hemorrhage.        ROXANNA BUTTS MD             PROCEDURES:  Lac Repair  Date/Time:2021 2:36 PM  Performed by: WENDY SHARPE DO  Consent: Verbal consent obtained.  Risks and benefits: risks, benefits and alternatives were discussed  Consent given by: patient  Patient understanding: patient states understanding of the procedure being performed  Patient identity confirmed: verbally with patient  Time out: Immediately prior to procedure verified the correct patient, procedure, equipment, support staff and site/side marked as required.  Location: Forehead  Length: 3 cm  Depth: Superficial without galea involvement  Anesthesia: local infiltration  Local anesthetic: LET  Irrigation solution: saline  Amount of cleaning: standard  Debridement: none  Number of sutures and type: 7 simple interrupted sutures with 5-0 Vicryl  Approximation: close  Approximation difficulty: simple  Dressing: 4x4 sterile gauze  Patient tolerance: Patient tolerated the procedure well with no immediate complications.              FINAL IMPRESSION:  1. Laceration of forehead, initial encounter    2. Concussion with loss of consciousness, initial encounter    3. Strain of neck muscle, initial encounter          ED COURSE & MEDICAL DECISION MAKIN:30 PM I met with the patient to gather history and to perform my initial exam. I discussed the plan for care while in the Emergency Department.    Pertinent Labs & Imaging studies reviewed. (See chart for details)  25 year old female presents to the Emergency Department for evaluation of head trauma status post mechanical fall where she landed on a wooden box causing a brief moment of loss of consciousness.  Her neuro exam and exam were unremarkable in the emergency department other than a small laceration on her forehead, and some mild bilateral cervical paraspinal tenderness.  Imaging did not show any evidence of acute fracture or intercranial bleed.  After the  wound was cleaned and let was applied, I was able to easily suture up her laceration.  Her tetanus was only 3 years ago.  At this time will discharge home with outpatient follow-up with her primary care provider.  I did instruct her on when the sutures should fall out, as well as signs of infection.  She verbalized understanding of this plan.  Return precautions discussed.    At the conclusion of the encounter I discussed the results of all of the tests and the disposition. The questions were answered. The patient or family acknowledged understanding and was agreeable with the care plan.        MEDICATIONS GIVEN IN THE EMERGENCY:  Medications   lidocaine/EPINEPHrine/tetracaine (LET) solution KIT (5 mLs Topical Given 6/17/21 1316)       NEW PRESCRIPTIONS STARTED AT TODAY'S ER VISIT  New Prescriptions    METHOCARBAMOL (ROBAXIN) 500 MG TABLET    Take 1 tablet (500 mg) by mouth 4 times daily as needed for muscle spasms        Jacob Duran D.O.  Emergency Medicine  [facility]  [address] [place] [id number]  [phone]  Dept: [phone]       Jacob Duran DO  06/17/21 6419

## 2021-06-17 NOTE — ED NOTES
Irrigated head laceration with 150 ml of sterile saline. LET applied and pt made ready for DI exams

## 2021-06-17 NOTE — DISCHARGE INSTRUCTIONS
Your sutures are absorbable and should fall out in the next 10 to 14 days.  If they do not follow-up with your primary for removal.  Return to the emergency department for redness, swelling, pus draining from the wound, fever, or any other concerns.

## 2021-06-17 NOTE — ED TRIAGE NOTES
"Pt presents with c/o tripping, falling forward striking her head on a wooden shoe box, lost consciousness for \"about 2 minutes\"  Suffering a lac to her forehead and c/o cervical neck pain she rates 6/10, c-collar applied.  "

## 2021-09-14 ENCOUNTER — HOSPITAL ENCOUNTER (EMERGENCY)
Facility: HOSPITAL | Age: 26
Discharge: HOME | End: 2021-09-14
Attending: INTERNAL MEDICINE | Admitting: INTERNAL MEDICINE
Payer: COMMERCIAL

## 2021-09-14 ENCOUNTER — APPOINTMENT (OUTPATIENT)
Dept: GENERAL RADIOLOGY | Facility: HOSPITAL | Age: 26
End: 2021-09-14
Attending: INTERNAL MEDICINE
Payer: COMMERCIAL

## 2021-09-14 VITALS
OXYGEN SATURATION: 97 % | HEART RATE: 100 BPM | RESPIRATION RATE: 16 BRPM | TEMPERATURE: 99.9 F | DIASTOLIC BLOOD PRESSURE: 107 MMHG | SYSTOLIC BLOOD PRESSURE: 156 MMHG

## 2021-09-14 DIAGNOSIS — J20.9 ACUTE BRONCHITIS, UNSPECIFIED ORGANISM: ICD-10-CM

## 2021-09-14 LAB — SARS-COV-2 RNA RESP QL NAA+PROBE: NEGATIVE

## 2021-09-14 PROCEDURE — 999N000157 HC STATISTIC RCP TIME EA 10 MIN

## 2021-09-14 PROCEDURE — 250N000009 HC RX 250: Performed by: INTERNAL MEDICINE

## 2021-09-14 PROCEDURE — 99284 EMERGENCY DEPT VISIT MOD MDM: CPT | Performed by: INTERNAL MEDICINE

## 2021-09-14 PROCEDURE — U0003 INFECTIOUS AGENT DETECTION BY NUCLEIC ACID (DNA OR RNA); SEVERE ACUTE RESPIRATORY SYNDROME CORONAVIRUS 2 (SARS-COV-2) (CORONAVIRUS DISEASE [COVID-19]), AMPLIFIED PROBE TECHNIQUE, MAKING USE OF HIGH THROUGHPUT TECHNOLOGIES AS DESCRIBED BY CMS-2020-01-R: HCPCS | Performed by: INTERNAL MEDICINE

## 2021-09-14 PROCEDURE — 94640 AIRWAY INHALATION TREATMENT: CPT

## 2021-09-14 PROCEDURE — 94640 AIRWAY INHALATION TREATMENT: CPT | Mod: 76

## 2021-09-14 PROCEDURE — 250N000012 HC RX MED GY IP 250 OP 636 PS 637: Performed by: INTERNAL MEDICINE

## 2021-09-14 PROCEDURE — 71046 X-RAY EXAM CHEST 2 VIEWS: CPT

## 2021-09-14 PROCEDURE — C9803 HOPD COVID-19 SPEC COLLECT: HCPCS

## 2021-09-14 PROCEDURE — 250N000013 HC RX MED GY IP 250 OP 250 PS 637: Performed by: INTERNAL MEDICINE

## 2021-09-14 PROCEDURE — 99284 EMERGENCY DEPT VISIT MOD MDM: CPT | Mod: 25

## 2021-09-14 RX ORDER — AZITHROMYCIN 250 MG/1
500 TABLET, FILM COATED ORAL ONCE
Status: COMPLETED | OUTPATIENT
Start: 2021-09-14 | End: 2021-09-14

## 2021-09-14 RX ORDER — AZITHROMYCIN 250 MG/1
TABLET, FILM COATED ORAL
Qty: 6 TABLET | Refills: 0 | Status: SHIPPED | OUTPATIENT
Start: 2021-09-15 | End: 2021-09-19

## 2021-09-14 RX ORDER — PREDNISONE 20 MG/1
40 TABLET ORAL ONCE
Status: COMPLETED | OUTPATIENT
Start: 2021-09-14 | End: 2021-09-14

## 2021-09-14 RX ORDER — IPRATROPIUM BROMIDE AND ALBUTEROL SULFATE 2.5; .5 MG/3ML; MG/3ML
3 SOLUTION RESPIRATORY (INHALATION) ONCE
Status: COMPLETED | OUTPATIENT
Start: 2021-09-14 | End: 2021-09-14

## 2021-09-14 RX ORDER — ALBUTEROL SULFATE 90 UG/1
6 AEROSOL, METERED RESPIRATORY (INHALATION) ONCE
Status: DISCONTINUED | OUTPATIENT
Start: 2021-09-14 | End: 2021-09-14 | Stop reason: HOSPADM

## 2021-09-14 RX ORDER — PREDNISONE 20 MG/1
20 TABLET ORAL DAILY
Qty: 4 TABLET | Refills: 0 | Status: SHIPPED | OUTPATIENT
Start: 2021-09-15 | End: 2021-09-19

## 2021-09-14 RX ADMIN — AZITHROMYCIN MONOHYDRATE 500 MG: 250 TABLET ORAL at 04:10

## 2021-09-14 RX ADMIN — IPRATROPIUM BROMIDE AND ALBUTEROL SULFATE 3 ML: .5; 3 SOLUTION RESPIRATORY (INHALATION) at 03:00

## 2021-09-14 RX ADMIN — PREDNISONE 40 MG: 20 TABLET ORAL at 04:11

## 2021-09-14 NOTE — ED NOTES
Discharge instructions reviewed with patient.  2 rx's given and will fill at pharmacy of choice.  Encouraged to f/u with PCP or ED with worsening symptoms.  No questions or concerns.  Copy of AVS in hand on discharge.

## 2021-09-14 NOTE — ED TRIAGE NOTES
Has been feeling sick for the past few days.  Started wheezing a few days ago.  This afternoon started to have chest wall pains.  Fells like she has a cold, sinus infection and ear infection. denies fever.  Pt does have cough started last Tuesday.  Not to her knowledge has she been around anyone with covid   done

## 2021-09-19 ASSESSMENT — ENCOUNTER SYMPTOMS
COLOR CHANGE: 0
FEVER: 0
EYE REDNESS: 0
ARTHRALGIAS: 0
SHORTNESS OF BREATH: 0
HEADACHES: 0
ABDOMINAL PAIN: 0
WHEEZING: 1
COUGH: 1
SINUS PRESSURE: 1
DIFFICULTY URINATING: 0
CONFUSION: 0
NECK STIFFNESS: 0

## 2021-09-19 NOTE — ED PROVIDER NOTES
History     Chief Complaint   Patient presents with     Cough     Sinusitis     The history is provided by the patient.   Cough  Cough characteristics:  Productive  Sputum characteristics:  Green  Severity:  Moderate  Onset quality:  Gradual  Timing:  Constant  Progression:  Worsening  Chronicity:  New  Associated symptoms: wheezing    Associated symptoms: no chest pain, no fever, no headaches and no shortness of breath          Allergies:  No Known Allergies    Problem List:    Patient Active Problem List    Diagnosis Date Noted     JUAN (generalized anxiety disorder) 09/28/2018     Priority: Medium     Panic attack 09/28/2018     Priority: Medium     Social anxiety disorder 09/28/2018     Priority: Medium     Major depressive disorder, recurrent episode, mild (H) 09/28/2018     Priority: Medium     Morbid obesity (H) 09/21/2018     Priority: Medium     Anxiety 09/21/2018     Priority: Medium     Fatty liver 09/21/2018     Priority: Medium     DUB (dysfunctional uterine bleeding) 01/30/2018     Priority: Medium     PCO (polycystic ovaries) 01/30/2018     Priority: Medium     Prediabetes 09/02/2016     Priority: Medium     ACP (advance care planning) 05/12/2016     Priority: Medium     Advance Care Planning 6/29/2017: ACP Review of Chart / Resources Provided:  Reviewed chart for advance care plan.  Karrie Oliveira has no plan or code status on file. Discussed available resources and provided with information.   Added by Adilia Mendez  Advance Care Planning 5/12/2016: ACP Review of Chart / Resources Provided:  Reviewed chart for advance care plan.  Karrie Oliveira has no plan or code status on file. Discussed available resources and provided with information. Confirmed code status reflects current choices pending further ACP discussions.  Confirmed/documented legally designated decision makers.  Added by Ruby Esposito             Esophageal reflux 08/18/2015     Priority: Medium        Past Medical History:     Past Medical History:   Diagnosis Date     GERD (gastroesophageal reflux disease)      Obesity      Prediabetes 9/2/2016       Past Surgical History:    Past Surgical History:   Procedure Laterality Date     MYRINGOTOMY, INSERT TUBE BILATERAL, COMBINED       TONSILLECTOMY         Family History:    Family History   Problem Relation Age of Onset     Thyroid Disease Mother      Diabetes Maternal Grandmother      Thyroid Disease Maternal Grandfather      Asthma No family hx of      Coronary Artery Disease No family hx of      Hypertension No family hx of        Social History:  Marital Status:  Single [1]  Social History     Tobacco Use     Smoking status: Never Smoker     Smokeless tobacco: Never Used   Substance Use Topics     Alcohol use: No     Alcohol/week: 0.0 standard drinks     Drug use: No        Medications:    azithromycin (ZITHROMAX Z-GENESIS) 250 MG tablet  ibuprofen 200 MG capsule  levonorgestrel (MIRENA) 20 MCG/24HR IUD  omeprazole (PRILOSEC) 40 MG DR capsule  predniSONE (DELTASONE) 20 MG tablet          Review of Systems   Constitutional: Negative for fever.   HENT: Positive for congestion and sinus pressure.    Eyes: Negative for redness.   Respiratory: Positive for cough and wheezing. Negative for shortness of breath.    Cardiovascular: Negative for chest pain.   Gastrointestinal: Negative for abdominal pain.   Genitourinary: Negative for difficulty urinating.   Musculoskeletal: Negative for arthralgias and neck stiffness.   Skin: Negative for color change.   Neurological: Negative for headaches.   Psychiatric/Behavioral: Negative for confusion.   All other systems reviewed and are negative.      Physical Exam   BP: (!) 156/107  Pulse: 100  Temp: 99.9  F (37.7  C)  Resp: 16  SpO2: 96 %      Physical Exam  Constitutional:       General: She is not in acute distress.     Appearance: She is not diaphoretic.   HENT:      Head: Atraumatic.      Mouth/Throat:      Pharynx: No oropharyngeal exudate.   Eyes:       General: No scleral icterus.     Pupils: Pupils are equal, round, and reactive to light.   Cardiovascular:      Heart sounds: Normal heart sounds.   Pulmonary:      Effort: No respiratory distress.      Breath sounds: Wheezing present.   Abdominal:      General: Bowel sounds are normal.      Palpations: Abdomen is soft.      Tenderness: There is no abdominal tenderness.   Musculoskeletal:         General: No tenderness.   Skin:     General: Skin is warm.      Findings: No rash.         ED Course        Procedures                  No results found for this or any previous visit (from the past 24 hour(s)).    Medications   albuterol (PROAIR HFA/PROVENTIL HFA/VENTOLIN HFA) 108 (90 BASE) MCG/ACT 8 gm ed starter pack (  Given 9/14/21 0212)   ipratropium - albuterol 0.5 mg/2.5 mg/3 mL (DUONEB) neb solution 3 mL (3 mLs Nebulization Given 9/14/21 0300)   predniSONE (DELTASONE) tablet 40 mg (40 mg Oral Given 9/14/21 0411)   azithromycin (ZITHROMAX) tablet 500 mg (500 mg Oral Given 9/14/21 0410)       Assessments & Plan (with Medical Decision Making)   Cough , congestion , wheezing  Symptoms improved after receiving inhaler in ER  CXR negative  zithro + prednisone started , follow-up with PCP  I have reviewed the nursing notes.    I have reviewed the findings, diagnosis, plan and need for follow up with the patient.      Discharge Medication List as of 9/14/2021  4:07 AM      START taking these medications    Details   azithromycin (ZITHROMAX Z-GENESIS) 250 MG tablet Two tablets on the first day, then one tablet daily for the next 4 days, Disp-6 tablet, R-0, Local Print      predniSONE (DELTASONE) 20 MG tablet Take 1 tablet (20 mg) by mouth daily for 4 days, Disp-4 tablet, R-0, Local Print             Final diagnoses:   Acute bronchitis, unspecified organism       9/14/2021   [facility]     Bakari Pimentel MD  09/19/21 [id number]

## 2021-10-18 DIAGNOSIS — K21.9 GASTROESOPHAGEAL REFLUX DISEASE, UNSPECIFIED WHETHER ESOPHAGITIS PRESENT: ICD-10-CM

## 2021-10-20 RX ORDER — OMEPRAZOLE 40 MG/1
CAPSULE, DELAYED RELEASE ORAL
Qty: 90 CAPSULE | Refills: 0 | Status: SHIPPED | OUTPATIENT
Start: 2021-10-20 | End: 2022-04-29

## 2021-10-20 NOTE — TELEPHONE ENCOUNTER
Omeprazole      Last Written Prescription Date:  5/14/21  Last Fill Quantity: 90,   # refills: 0  Last Office Visit: 12/20/19  Future Office visit:       Routing refill request to provider for review/approval because:

## 2021-11-08 ENCOUNTER — HOSPITAL ENCOUNTER (EMERGENCY)
Facility: HOSPITAL | Age: 26
Discharge: HOME | End: 2021-11-08
Attending: EMERGENCY MEDICINE | Admitting: EMERGENCY MEDICINE
Payer: COMMERCIAL

## 2021-11-08 VITALS
SYSTOLIC BLOOD PRESSURE: 148 MMHG | RESPIRATION RATE: 18 BRPM | OXYGEN SATURATION: 98 % | HEART RATE: 76 BPM | TEMPERATURE: 98.5 F | DIASTOLIC BLOOD PRESSURE: 109 MMHG

## 2021-11-08 DIAGNOSIS — R10.11 RUQ ABDOMINAL PAIN: ICD-10-CM

## 2021-11-08 LAB
ALBUMIN SERPL-MCNC: 3.3 G/DL (ref 3.4–5)
ALBUMIN UR-MCNC: NEGATIVE MG/DL
ALP SERPL-CCNC: 67 U/L (ref 40–150)
ALT SERPL W P-5'-P-CCNC: 29 U/L (ref 0–50)
ANION GAP SERPL CALCULATED.3IONS-SCNC: 5 MMOL/L (ref 3–14)
APPEARANCE UR: CLEAR
AST SERPL W P-5'-P-CCNC: 22 U/L (ref 0–45)
BACTERIA #/AREA URNS HPF: ABNORMAL /HPF
BASOPHILS # BLD AUTO: 0 10E3/UL (ref 0–0.2)
BASOPHILS NFR BLD AUTO: 0 %
BILIRUB DIRECT SERPL-MCNC: <0.1 MG/DL (ref 0–0.2)
BILIRUB SERPL-MCNC: 0.3 MG/DL (ref 0.2–1.3)
BILIRUB UR QL STRIP: NEGATIVE
BUN SERPL-MCNC: 10 MG/DL (ref 7–30)
CALCIUM SERPL-MCNC: 8.7 MG/DL (ref 8.5–10.1)
CHLORIDE BLD-SCNC: 107 MMOL/L (ref 94–109)
CO2 SERPL-SCNC: 25 MMOL/L (ref 20–32)
COLOR UR AUTO: ABNORMAL
CREAT SERPL-MCNC: 0.56 MG/DL (ref 0.52–1.04)
EOSINOPHIL # BLD AUTO: 0.3 10E3/UL (ref 0–0.7)
EOSINOPHIL NFR BLD AUTO: 4 %
ERYTHROCYTE [DISTWIDTH] IN BLOOD BY AUTOMATED COUNT: 12.4 % (ref 10–15)
GFR SERPL CREATININE-BSD FRML MDRD: >90 ML/MIN/1.73M2
GLUCOSE BLD-MCNC: 130 MG/DL (ref 70–99)
GLUCOSE UR STRIP-MCNC: NEGATIVE MG/DL
HCT VFR BLD AUTO: 41.2 % (ref 35–47)
HGB BLD-MCNC: 13.7 G/DL (ref 11.7–15.7)
HGB UR QL STRIP: NEGATIVE
HOLD SPECIMEN: NORMAL
IMM GRANULOCYTES # BLD: 0 10E3/UL
IMM GRANULOCYTES NFR BLD: 0 %
KETONES UR STRIP-MCNC: NEGATIVE MG/DL
LEUKOCYTE ESTERASE UR QL STRIP: NEGATIVE
LYMPHOCYTES # BLD AUTO: 1.5 10E3/UL (ref 0.8–5.3)
LYMPHOCYTES NFR BLD AUTO: 25 %
MCH RBC QN AUTO: 29 PG (ref 26.5–33)
MCHC RBC AUTO-ENTMCNC: 33.3 G/DL (ref 31.5–36.5)
MCV RBC AUTO: 87 FL (ref 78–100)
MONOCYTES # BLD AUTO: 0.7 10E3/UL (ref 0–1.3)
MONOCYTES NFR BLD AUTO: 11 %
MUCOUS THREADS #/AREA URNS LPF: PRESENT /LPF
NEUTROPHILS # BLD AUTO: 3.5 10E3/UL (ref 1.6–8.3)
NEUTROPHILS NFR BLD AUTO: 60 %
NITRATE UR QL: NEGATIVE
NRBC # BLD AUTO: 0 10E3/UL
NRBC BLD AUTO-RTO: 0 /100
PH UR STRIP: 5 [PH] (ref 4.7–8)
PLATELET # BLD AUTO: 317 10E3/UL (ref 150–450)
POTASSIUM BLD-SCNC: 3.5 MMOL/L (ref 3.4–5.3)
PROT SERPL-MCNC: 8.3 G/DL (ref 6.8–8.8)
RBC # BLD AUTO: 4.73 10E6/UL (ref 3.8–5.2)
RBC URINE: 1 /HPF
SODIUM SERPL-SCNC: 137 MMOL/L (ref 133–144)
SP GR UR STRIP: 1.02 (ref 1–1.03)
SQUAMOUS EPITHELIAL: 3 /HPF
UROBILINOGEN UR STRIP-MCNC: NORMAL MG/DL
WBC # BLD AUTO: 6 10E3/UL (ref 4–11)
WBC URINE: 1 /HPF

## 2021-11-08 PROCEDURE — 82248 BILIRUBIN DIRECT: CPT | Performed by: EMERGENCY MEDICINE

## 2021-11-08 PROCEDURE — 36415 COLL VENOUS BLD VENIPUNCTURE: CPT | Performed by: EMERGENCY MEDICINE

## 2021-11-08 PROCEDURE — 99283 EMERGENCY DEPT VISIT LOW MDM: CPT | Performed by: EMERGENCY MEDICINE

## 2021-11-08 PROCEDURE — 99283 EMERGENCY DEPT VISIT LOW MDM: CPT

## 2021-11-08 PROCEDURE — 250N000013 HC RX MED GY IP 250 OP 250 PS 637: Performed by: EMERGENCY MEDICINE

## 2021-11-08 PROCEDURE — 80048 BASIC METABOLIC PNL TOTAL CA: CPT | Performed by: EMERGENCY MEDICINE

## 2021-11-08 PROCEDURE — 85025 COMPLETE CBC W/AUTO DIFF WBC: CPT | Performed by: EMERGENCY MEDICINE

## 2021-11-08 PROCEDURE — 81001 URINALYSIS AUTO W/SCOPE: CPT | Performed by: EMERGENCY MEDICINE

## 2021-11-08 RX ORDER — OXYCODONE AND ACETAMINOPHEN 5; 325 MG/1; MG/1
1 TABLET ORAL ONCE
Status: COMPLETED | OUTPATIENT
Start: 2021-11-08 | End: 2021-11-08

## 2021-11-08 RX ADMIN — OXYCODONE AND ACETAMINOPHEN 1 TABLET: 5; 325 TABLET ORAL at 23:11

## 2021-11-09 ENCOUNTER — HOSPITAL ENCOUNTER (OUTPATIENT)
Dept: ULTRASOUND IMAGING | Facility: HOSPITAL | Age: 26
Discharge: HOME | End: 2021-11-09
Attending: EMERGENCY MEDICINE | Admitting: EMERGENCY MEDICINE
Payer: COMMERCIAL

## 2021-11-09 DIAGNOSIS — R10.11 RUQ ABDOMINAL PAIN: ICD-10-CM

## 2021-11-09 PROCEDURE — 76705 ECHO EXAM OF ABDOMEN: CPT

## 2021-11-09 NOTE — ED NOTES
Ambulatory to ED room 5 with c/o 3 diarrheal episodes today accompanied by RUQ abd pain and tenderness that radiates into back. Pain started yesterday after eating some leftover enchiladas. Denies experiencing pain like this in the past. Does have gallbladder and appendix. LMP ~ 1 month ago and denies possibility of pregnancy. Also denies n/v, dysuria, and recent abx. Some tenderness noted on palpation of RUQ.

## 2021-11-10 ASSESSMENT — ENCOUNTER SYMPTOMS
CHILLS: 0
COUGH: 0
SHORTNESS OF BREATH: 0
FEVER: 0

## 2021-11-10 NOTE — ED PROVIDER NOTES
History     Chief Complaint   Patient presents with     Abdominal Pain     c/o rt upper abd pain and diarrhea since yesterday     HPI  Karrie Oliveira is a 26 year old female who is here w/ abdominal pain. Has been there since yesterday. RUQ. Radiates to back. Worse w/ food (ate cheeseburger en route to here). No nausea or vomiting but some diarrhea. No dysuria or other sx. Pain is colicky, sharp. H/o similar in the past, workup did not single out gallbladder.    Allergies:  No Known Allergies    Problem List:    Patient Active Problem List    Diagnosis Date Noted     JUAN (generalized anxiety disorder) 09/28/2018     Priority: Medium     Panic attack 09/28/2018     Priority: Medium     Social anxiety disorder 09/28/2018     Priority: Medium     Major depressive disorder, recurrent episode, mild (H) 09/28/2018     Priority: Medium     Morbid obesity (H) 09/21/2018     Priority: Medium     Anxiety 09/21/2018     Priority: Medium     Fatty liver 09/21/2018     Priority: Medium     DUB (dysfunctional uterine bleeding) 01/30/2018     Priority: Medium     PCO (polycystic ovaries) 01/30/2018     Priority: Medium     Prediabetes 09/02/2016     Priority: Medium     ACP (advance care planning) 05/12/2016     Priority: Medium     Advance Care Planning 6/29/2017: ACP Review of Chart / Resources Provided:  Reviewed chart for advance care plan.  Karrie Oliveira has no plan or code status on file. Discussed available resources and provided with information.   Added by Adilia Mendez  Advance Care Planning 5/12/2016: ACP Review of Chart / Resources Provided:  Reviewed chart for advance care plan.  Karrie Oliveira has no plan or code status on file. Discussed available resources and provided with information. Confirmed code status reflects current choices pending further ACP discussions.  Confirmed/documented legally designated decision makers.  Added by Ruby Esposito             Esophageal reflux 08/18/2015     Priority:  Medium        Past Medical History:    Past Medical History:   Diagnosis Date     GERD (gastroesophageal reflux disease)      Obesity      Prediabetes 9/2/2016       Past Surgical History:    Past Surgical History:   Procedure Laterality Date     MYRINGOTOMY, INSERT TUBE BILATERAL, COMBINED       TONSILLECTOMY         Family History:    Family History   Problem Relation Age of Onset     Thyroid Disease Mother      Diabetes Maternal Grandmother      Thyroid Disease Maternal Grandfather      Asthma No family hx of      Coronary Artery Disease No family hx of      Hypertension No family hx of        Social History:  Marital Status:  Single [1]  Social History     Tobacco Use     Smoking status: Never Smoker     Smokeless tobacco: Never Used   Substance Use Topics     Alcohol use: No     Alcohol/week: 0.0 standard drinks     Drug use: No        Medications:    ibuprofen 200 MG capsule  levonorgestrel (MIRENA) 20 MCG/24HR IUD  omeprazole (PRILOSEC) 40 MG DR capsule          Review of Systems   Constitutional: Negative for chills and fever.   Respiratory: Negative for cough and shortness of breath.    All other systems reviewed and are negative.      Physical Exam   BP: 147/95  Pulse: 70  Temp: 98.7  F (37.1  C)  Resp: 16  SpO2: 100 %      Physical Exam  Constitutional:       General: She is not in acute distress.     Appearance: She is not diaphoretic.   HENT:      Head: Normocephalic and atraumatic.      Right Ear: External ear normal.      Left Ear: External ear normal.      Nose: No congestion or rhinorrhea.      Mouth/Throat:      Pharynx: Oropharynx is clear. No oropharyngeal exudate.   Eyes:      General: No scleral icterus.     Pupils: Pupils are equal, round, and reactive to light.   Cardiovascular:      Rate and Rhythm: Normal rate and regular rhythm.      Heart sounds: Normal heart sounds.   Pulmonary:      Effort: No respiratory distress.      Breath sounds: Normal breath sounds.   Abdominal:      General:  Bowel sounds are normal.      Palpations: Abdomen is soft.      Tenderness: There is abdominal tenderness in the right upper quadrant. There is no right CVA tenderness or left CVA tenderness.   Musculoskeletal:         General: No tenderness.      Cervical back: Normal range of motion and neck supple.      Right lower leg: No edema.      Left lower leg: No edema.   Skin:     General: Skin is warm.      Capillary Refill: Capillary refill takes less than 2 seconds.      Findings: No rash.   Neurological:      Mental Status: Mental status is at baseline.      Cranial Nerves: No cranial nerve deficit.   Psychiatric:         Mood and Affect: Mood normal.         Behavior: Behavior normal.         ED Course        Procedures              Critical Care time:  none               No results found for this or any previous visit (from the past 24 hour(s)).    Medications   oxyCODONE-acetaminophen (PERCOCET) 5-325 MG per tablet 1 tablet (1 tablet Oral Given 11/8/21 3961)       Assessments & Plan (with Medical Decision Making)     I have reviewed the nursing notes.    I have reviewed the findings, diagnosis, plan and need for follow up with the patient.   25 yo f here w/ RUQ pain. No US available overnight, however given no fever or chills, low suspicion for acute cholecystitis. I will not order a CT scan as that is an inferior study for gallstones and radiation to pt is not worth it. Given body habitus, will not attempt RUQ US as she needs a formal and can wait until the morning. Self-transfer to [place] discussed, pt declined. Given pain meds for the evening.    Discharge Medication List as of 11/8/2021 11:06 PM          Final diagnoses:   RUQ abdominal pain       11/8/2021   [facility]     Candelario Brown MD  11/10/21 0373

## 2022-03-03 NOTE — NURSING NOTE
"Chief Complaint   Patient presents with     Recheck Medication       Initial /84 (BP Location: Left arm, Patient Position: Sitting, Cuff Size: Adult Large)   Pulse 77   Temp 98.9  F (37.2  C) (Tympanic)   Ht 1.651 m (5' 5\")   SpO2 100%   BMI 70.97 kg/m   Estimated body mass index is 70.97 kg/m  as calculated from the following:    Height as of this encounter: 1.651 m (5' 5\").    Weight as of 1/16/19: 193.5 kg (426 lb 8 oz).  Medication Reconciliation: complete  Neela Claros LPN  " 200+ ft

## 2022-03-14 ENCOUNTER — HOSPITAL ENCOUNTER (EMERGENCY)
Facility: HOSPITAL | Age: 27
Discharge: HOME | End: 2022-03-14
Attending: EMERGENCY MEDICINE | Admitting: EMERGENCY MEDICINE
Payer: COMMERCIAL

## 2022-03-14 VITALS
OXYGEN SATURATION: 96 % | DIASTOLIC BLOOD PRESSURE: 89 MMHG | RESPIRATION RATE: 16 BRPM | HEART RATE: 110 BPM | SYSTOLIC BLOOD PRESSURE: 167 MMHG | TEMPERATURE: 100.2 F

## 2022-03-14 DIAGNOSIS — J02.9 ACUTE PHARYNGITIS, UNSPECIFIED ETIOLOGY: ICD-10-CM

## 2022-03-14 DIAGNOSIS — J01.90 ACUTE SINUSITIS, RECURRENCE NOT SPECIFIED, UNSPECIFIED LOCATION: ICD-10-CM

## 2022-03-14 PROCEDURE — 99283 EMERGENCY DEPT VISIT LOW MDM: CPT

## 2022-03-14 PROCEDURE — 99283 EMERGENCY DEPT VISIT LOW MDM: CPT | Performed by: EMERGENCY MEDICINE

## 2022-03-14 ASSESSMENT — ENCOUNTER SYMPTOMS
SORE THROAT: 1
EYES NEGATIVE: 1
MUSCULOSKELETAL NEGATIVE: 1
GASTROINTESTINAL NEGATIVE: 1
COUGH: 1
FEVER: 1
CARDIOVASCULAR NEGATIVE: 1
SINUS PRESSURE: 1
SHORTNESS OF BREATH: 0
NEUROLOGICAL NEGATIVE: 1
SINUS PAIN: 1

## 2022-03-14 NOTE — ED PROVIDER NOTES
History     Chief Complaint   Patient presents with     Pharyngitis     HPI  Karrie Oliveira is a 26 year old female who comes to the emergency room complaining of a severe sore throat sinus pressure and harsh cough since Friday.  Patient is also had intermittent fevers and chills.  She relates that the pain in her throat has gotten quite a bit worse this morning so she came to the emerge department to seek treatment.  She has a pressure sensation in her ears as well.  She denies pain in her chest and does not feel short of breath.  She has had no nausea or vomiting.  She has had no change in her bowel or bladder habits.  She has used over-the-counter cough preparations but they really do not seem to be helping her symptoms that much.    Allergies:  No Known Allergies    Problem List:    Patient Active Problem List    Diagnosis Date Noted     JUAN (generalized anxiety disorder) 09/28/2018     Priority: Medium     Panic attack 09/28/2018     Priority: Medium     Social anxiety disorder 09/28/2018     Priority: Medium     Major depressive disorder, recurrent episode, mild (H) 09/28/2018     Priority: Medium     Morbid obesity (H) 09/21/2018     Priority: Medium     Anxiety 09/21/2018     Priority: Medium     Fatty liver 09/21/2018     Priority: Medium     DUB (dysfunctional uterine bleeding) 01/30/2018     Priority: Medium     PCO (polycystic ovaries) 01/30/2018     Priority: Medium     Prediabetes 09/02/2016     Priority: Medium     ACP (advance care planning) 05/12/2016     Priority: Medium     Advance Care Planning 6/29/2017: ACP Review of Chart / Resources Provided:  Reviewed chart for advance care plan.  Karrie Oliveira has no plan or code status on file. Discussed available resources and provided with information.   Added by Adilia Mendez  Advance Care Planning 5/12/2016: ACP Review of Chart / Resources Provided:  Reviewed chart for advance care plan.  Karrie Oliveira has no plan or code status on file.  Discussed available resources and provided with information. Confirmed code status reflects current choices pending further ACP discussions.  Confirmed/documented legally designated decision makers.  Added by Ruby Esposito             Esophageal reflux 08/18/2015     Priority: Medium        Past Medical History:    Past Medical History:   Diagnosis Date     GERD (gastroesophageal reflux disease)      Obesity      Prediabetes 9/2/2016       Past Surgical History:    Past Surgical History:   Procedure Laterality Date     MYRINGOTOMY, INSERT TUBE BILATERAL, COMBINED       TONSILLECTOMY         Family History:    Family History   Problem Relation Age of Onset     Thyroid Disease Mother      Diabetes Maternal Grandmother      Thyroid Disease Maternal Grandfather      Asthma No family hx of      Coronary Artery Disease No family hx of      Hypertension No family hx of        Social History:  Marital Status:  Single [1]  Social History     Tobacco Use     Smoking status: Never Smoker     Smokeless tobacco: Never Used   Substance Use Topics     Alcohol use: No     Alcohol/week: 0.0 standard drinks     Drug use: No        Medications:    amoxicillin-clavulanate (AUGMENTIN) 875-125 MG tablet  ibuprofen 200 MG capsule  levonorgestrel (MIRENA) 20 MCG/24HR IUD  omeprazole (PRILOSEC) 40 MG DR capsule          Review of Systems   Constitutional: Positive for fever.   HENT: Positive for sinus pressure, sinus pain and sore throat.    Eyes: Negative.    Respiratory: Positive for cough. Negative for shortness of breath.    Cardiovascular: Negative.    Gastrointestinal: Negative.    Genitourinary: Negative.    Musculoskeletal: Negative.    Skin: Negative.    Neurological: Negative.    Please see history of chief complaint.  All other appropriate systems reviewed and found unremarkable    Physical Exam   BP: 167/89  Pulse: 110  Temp: 100.2  F (37.9  C)  Resp: 16  SpO2: 96 %      Physical Exam 26-year-old young lady who is awake  alert oriented person place and time.  Very pleasant and cooperative with my exam.  HEENT normocephalic extraocular muscles intact pupils equally round and neck to light.  External auditory canals are clear tympanic membranes are dull.  No inner ear effusion is noted.  Nasal mucosa is quite injected with yellowish rhinorrhea.  Tongue midline pill intact oropharynx is erythematous.  There is no peritonsillar abscess or exudate noted.  Neck is supple there is nontender anterior cervical adenopathy.  No evidence of nuchal irritation.  Lungs are clear bilaterally.  Heart maintains regular rate and rhythm.  S1 and S2 sounds are appreciated.  Abdomen is soft and its nontender.  Extremities a full range of motion 5/5 strength.  Brisk peripheral pulses and brisk capillary refill.  Neurologic exam no focal deficit.  Dermatologic exam no diffuse skin rashes or lesions.    ED Course      I will prescribe a course of Augmentin for the patient.  She is given appropriate discharge instructions.  I will advise her to follow-up with her primary care provider later this week.                               No results found for this or any previous visit (from the past 24 hour(s)).    Medications - No data to display    Assessments & Plan (with Medical Decision Making)     I have reviewed the nursing notes.    I have reviewed the findings, diagnosis, plan and need for follow up with the patient.      New Prescriptions    AMOXICILLIN-CLAVULANATE (AUGMENTIN) 875-125 MG TABLET    Take 1 tablet by mouth 2 times daily       Final diagnoses:   Acute pharyngitis, unspecified etiology   Acute sinusitis, recurrence not specified, unspecified location       3/14/2022   [facility]     Phil Rangel,   03/14/22 0424

## 2022-04-29 ENCOUNTER — OFFICE VISIT (OUTPATIENT)
Dept: FAMILY MEDICINE | Facility: OTHER | Age: 27
End: 2022-04-29
Attending: FAMILY MEDICINE
Payer: COMMERCIAL

## 2022-04-29 VITALS
TEMPERATURE: 98.2 F | HEART RATE: 68 BPM | RESPIRATION RATE: 20 BRPM | DIASTOLIC BLOOD PRESSURE: 87 MMHG | SYSTOLIC BLOOD PRESSURE: 122 MMHG | OXYGEN SATURATION: 99 % | BODY MASS INDEX: 64.07 KG/M2 | WEIGHT: 293 LBS

## 2022-04-29 DIAGNOSIS — F40.10 SOCIAL ANXIETY DISORDER: ICD-10-CM

## 2022-04-29 DIAGNOSIS — Z11.3 SCREEN FOR STD (SEXUALLY TRANSMITTED DISEASE): ICD-10-CM

## 2022-04-29 DIAGNOSIS — F33.0 MAJOR DEPRESSIVE DISORDER, RECURRENT EPISODE, MILD (H): ICD-10-CM

## 2022-04-29 DIAGNOSIS — Z13.220 LIPID SCREENING: ICD-10-CM

## 2022-04-29 DIAGNOSIS — E55.9 VITAMIN D DEFICIENCY: ICD-10-CM

## 2022-04-29 DIAGNOSIS — Z30.431 IUD CHECK UP: ICD-10-CM

## 2022-04-29 DIAGNOSIS — K21.9 GASTROESOPHAGEAL REFLUX DISEASE, UNSPECIFIED WHETHER ESOPHAGITIS PRESENT: Primary | ICD-10-CM

## 2022-04-29 DIAGNOSIS — E66.01 MORBID OBESITY (H): ICD-10-CM

## 2022-04-29 DIAGNOSIS — F41.1 GAD (GENERALIZED ANXIETY DISORDER): ICD-10-CM

## 2022-04-29 DIAGNOSIS — R73.03 PREDIABETES: ICD-10-CM

## 2022-04-29 DIAGNOSIS — K76.0 FATTY LIVER: ICD-10-CM

## 2022-04-29 PROBLEM — F41.9 ANXIETY: Status: RESOLVED | Noted: 2018-09-21 | Resolved: 2022-04-29

## 2022-04-29 LAB
ALBUMIN SERPL-MCNC: 3.3 G/DL (ref 3.4–5)
ALP SERPL-CCNC: 59 U/L (ref 40–150)
ALT SERPL W P-5'-P-CCNC: 26 U/L (ref 0–50)
ANION GAP SERPL CALCULATED.3IONS-SCNC: 5 MMOL/L (ref 3–14)
AST SERPL W P-5'-P-CCNC: 13 U/L (ref 0–45)
BASOPHILS # BLD AUTO: 0 10E3/UL (ref 0–0.2)
BASOPHILS NFR BLD AUTO: 0 %
BILIRUB SERPL-MCNC: 0.4 MG/DL (ref 0.2–1.3)
BUN SERPL-MCNC: 9 MG/DL (ref 7–30)
C TRACH DNA SPEC QL PROBE+SIG AMP: NEGATIVE
CALCIUM SERPL-MCNC: 8.7 MG/DL (ref 8.5–10.1)
CHLORIDE BLD-SCNC: 104 MMOL/L (ref 94–109)
CHOLEST SERPL-MCNC: 177 MG/DL
CO2 SERPL-SCNC: 26 MMOL/L (ref 20–32)
CREAT SERPL-MCNC: 0.54 MG/DL (ref 0.52–1.04)
EOSINOPHIL # BLD AUTO: 0.3 10E3/UL (ref 0–0.7)
EOSINOPHIL NFR BLD AUTO: 4 %
ERYTHROCYTE [DISTWIDTH] IN BLOOD BY AUTOMATED COUNT: 12.3 % (ref 10–15)
FASTING STATUS PATIENT QL REPORTED: YES
GFR SERPL CREATININE-BSD FRML MDRD: >90 ML/MIN/1.73M2
GLUCOSE BLD-MCNC: 97 MG/DL (ref 70–99)
HCT VFR BLD AUTO: 42.8 % (ref 35–47)
HDLC SERPL-MCNC: 41 MG/DL
HGB BLD-MCNC: 14.1 G/DL (ref 11.7–15.7)
IMM GRANULOCYTES # BLD: 0 10E3/UL
IMM GRANULOCYTES NFR BLD: 0 %
LDLC SERPL CALC-MCNC: 102 MG/DL
LYMPHOCYTES # BLD AUTO: 1.8 10E3/UL (ref 0.8–5.3)
LYMPHOCYTES NFR BLD AUTO: 25 %
MCH RBC QN AUTO: 29.1 PG (ref 26.5–33)
MCHC RBC AUTO-ENTMCNC: 32.9 G/DL (ref 31.5–36.5)
MCV RBC AUTO: 88 FL (ref 78–100)
MONOCYTES # BLD AUTO: 0.6 10E3/UL (ref 0–1.3)
MONOCYTES NFR BLD AUTO: 8 %
N GONORRHOEA DNA SPEC QL NAA+PROBE: NEGATIVE
NEUTROPHILS # BLD AUTO: 4.6 10E3/UL (ref 1.6–8.3)
NEUTROPHILS NFR BLD AUTO: 63 %
NONHDLC SERPL-MCNC: 136 MG/DL
NRBC # BLD AUTO: 0 10E3/UL
NRBC BLD AUTO-RTO: 0 /100
PLATELET # BLD AUTO: 281 10E3/UL (ref 150–450)
POTASSIUM BLD-SCNC: 3.7 MMOL/L (ref 3.4–5.3)
PROT SERPL-MCNC: 8 G/DL (ref 6.8–8.8)
RBC # BLD AUTO: 4.85 10E6/UL (ref 3.8–5.2)
SODIUM SERPL-SCNC: 135 MMOL/L (ref 133–144)
TRIGL SERPL-MCNC: 168 MG/DL
TSH SERPL DL<=0.005 MIU/L-ACNC: 2.64 MU/L (ref 0.4–4)
WBC # BLD AUTO: 7.4 10E3/UL (ref 4–11)

## 2022-04-29 PROCEDURE — 86803 HEPATITIS C AB TEST: CPT | Mod: ZL | Performed by: FAMILY MEDICINE

## 2022-04-29 PROCEDURE — 87389 HIV-1 AG W/HIV-1&-2 AB AG IA: CPT | Mod: ZL | Performed by: FAMILY MEDICINE

## 2022-04-29 PROCEDURE — G0463 HOSPITAL OUTPT CLINIC VISIT: HCPCS

## 2022-04-29 PROCEDURE — 87591 N.GONORRHOEAE DNA AMP PROB: CPT | Mod: ZL | Performed by: FAMILY MEDICINE

## 2022-04-29 PROCEDURE — 82947 ASSAY GLUCOSE BLOOD QUANT: CPT | Mod: ZL | Performed by: FAMILY MEDICINE

## 2022-04-29 PROCEDURE — 82306 VITAMIN D 25 HYDROXY: CPT | Mod: ZL | Performed by: FAMILY MEDICINE

## 2022-04-29 PROCEDURE — 84443 ASSAY THYROID STIM HORMONE: CPT | Mod: ZL | Performed by: FAMILY MEDICINE

## 2022-04-29 PROCEDURE — 85025 COMPLETE CBC W/AUTO DIFF WBC: CPT | Mod: ZL | Performed by: FAMILY MEDICINE

## 2022-04-29 PROCEDURE — 36415 COLL VENOUS BLD VENIPUNCTURE: CPT | Mod: ZL | Performed by: FAMILY MEDICINE

## 2022-04-29 PROCEDURE — 80061 LIPID PANEL: CPT | Mod: ZL | Performed by: FAMILY MEDICINE

## 2022-04-29 PROCEDURE — 99214 OFFICE O/P EST MOD 30 MIN: CPT | Performed by: FAMILY MEDICINE

## 2022-04-29 RX ORDER — IBUPROFEN 800 MG/1
800 TABLET, FILM COATED ORAL
COMMUNITY
Start: 2020-11-14 | End: 2022-10-12

## 2022-04-29 RX ORDER — SUCRALFATE 1 G/1
1 TABLET ORAL 4 TIMES DAILY
Qty: 56 TABLET | Refills: 0 | Status: SHIPPED | OUTPATIENT
Start: 2022-04-29 | End: 2022-05-13

## 2022-04-29 RX ORDER — OMEPRAZOLE 40 MG/1
40 CAPSULE, DELAYED RELEASE ORAL DAILY
Qty: 90 CAPSULE | Refills: 1 | Status: SHIPPED | OUTPATIENT
Start: 2022-04-29 | End: 2022-10-31

## 2022-04-29 ASSESSMENT — ANXIETY QUESTIONNAIRES
2. NOT BEING ABLE TO STOP OR CONTROL WORRYING: NEARLY EVERY DAY
4. TROUBLE RELAXING: NEARLY EVERY DAY
7. FEELING AFRAID AS IF SOMETHING AWFUL MIGHT HAPPEN: NEARLY EVERY DAY
5. BEING SO RESTLESS THAT IT IS HARD TO SIT STILL: MORE THAN HALF THE DAYS
3. WORRYING TOO MUCH ABOUT DIFFERENT THINGS: NEARLY EVERY DAY
GAD7 TOTAL SCORE: 19
IF YOU CHECKED OFF ANY PROBLEMS ON THIS QUESTIONNAIRE, HOW DIFFICULT HAVE THESE PROBLEMS MADE IT FOR YOU TO DO YOUR WORK, TAKE CARE OF THINGS AT HOME, OR GET ALONG WITH OTHER PEOPLE: SOMEWHAT DIFFICULT
6. BECOMING EASILY ANNOYED OR IRRITABLE: MORE THAN HALF THE DAYS
1. FEELING NERVOUS, ANXIOUS, OR ON EDGE: NEARLY EVERY DAY

## 2022-04-29 ASSESSMENT — PATIENT HEALTH QUESTIONNAIRE - PHQ9: SUM OF ALL RESPONSES TO PHQ QUESTIONS 1-9: 22

## 2022-04-29 ASSESSMENT — PAIN SCALES - GENERAL: PAINLEVEL: SEVERE PAIN (7)

## 2022-04-29 NOTE — NURSING NOTE
"Chief Complaint   Patient presents with     Gastrophageal Reflux       Initial Pulse 68   Temp 98.2  F (36.8  C) (Tympanic)   Resp 20   Wt (!) 174.6 kg (385 lb)   SpO2 99%   BMI 64.07 kg/m   Estimated body mass index is 64.07 kg/m  as calculated from the following:    Height as of 12/20/19: 1.651 m (5' 5\").    Weight as of this encounter: 174.6 kg (385 lb).  Medication Reconciliation: complete  Rasheeda Harry LPN    "

## 2022-04-29 NOTE — LETTER
May 11, 2022      Karrie Oliveira  [address] [facility] [address]        Dear ,    We are writing to inform you of your test results.    Notify of negative std screening.   Vit d low- 8 week replacement sent.  Then 2000 units OTC daily.   Lipids slightly elevated and other labs stable.       Resulted Orders   TSH with free T4 reflex   Result Value Ref Range    TSH 2.64 0.40 - 4.00 mU/L   Vitamin D Deficiency   Result Value Ref Range    Vitamin D, Total (25-Hydroxy) 14 (L) 20 - 75 ug/L    Narrative    Season, race, dietary intake, and treatment affect the concentration of 25-hydroxy-Vitamin D. Values may decrease during winter months and increase during summer months. Values 20-29 ug/L may indicate Vitamin D insufficiency and values <20 ug/L may indicate Vitamin D deficiency.    Vitamin D determination is routinely performed by an immunoassay specific for 25 hydroxyvitamin D3.  If an individual is on vitamin D2(ergocalciferol) supplementation, please specify 25 OH vitamin D2 and D3 level determination by LCMSMS test VITD23.     Comprehensive metabolic panel (BMP + Alb, Alk Phos, ALT, AST, Total. Bili, TP)   Result Value Ref Range    Sodium 135 133 - 144 mmol/L    Potassium 3.7 3.4 - 5.3 mmol/L    Chloride 104 94 - 109 mmol/L    Carbon Dioxide (CO2) 26 20 - 32 mmol/L    Anion Gap 5 3 - 14 mmol/L    Urea Nitrogen 9 7 - 30 mg/dL    Creatinine 0.54 0.52 - 1.04 mg/dL    Calcium 8.7 8.5 - 10.1 mg/dL    Glucose 97 70 - 99 mg/dL    Alkaline Phosphatase 59 40 - 150 U/L    AST 13 0 - 45 U/L    ALT 26 0 - 50 U/L    Protein Total 8.0 6.8 - 8.8 g/dL    Albumin 3.3 (L) 3.4 - 5.0 g/dL    Bilirubin Total 0.4 0.2 - 1.3 mg/dL    GFR Estimate >90 >60 mL/min/1.73m2      Comment:      Effective December 21, 2021 eGFRcr in adults is calculated using the 2021 CKD-EPI creatinine equation which includes age and gender (Fay antonio al., NEJM, DOI: 10.1056/FSBYrv1449752)   Lipid Profile (Chol, Trig, HDL, LDL calc)   Result Value Ref  Range    Cholesterol 177 <200 mg/dL    Triglycerides 168 (H) <150 mg/dL    Direct Measure HDL 41 (L) >=50 mg/dL    LDL Cholesterol Calculated 102 (H) <=100 mg/dL    Non HDL Cholesterol 136 (H) <130 mg/dL    Patient Fasting > 8hrs? Yes     Narrative    Cholesterol  Desirable:  <200 mg/dL    Triglycerides  Normal:  Less than 150 mg/dL  Borderline High:  150-199 mg/dL  High:  200-499 mg/dL  Very High:  Greater than or equal to 500 mg/dL    Direct Measure HDL  Female:  Greater than or equal to 50 mg/dL   Male:  Greater than or equal to 40 mg/dL    LDL Cholesterol  Desirable:  <100mg/dL  Above Desirable:  100-129 mg/dL   Borderline High:  130-159 mg/dL   High:  160-189 mg/dL   Very High:  >= 190 mg/dL    Non HDL Cholesterol  Desirable:  130 mg/dL  Above Desirable:  130-159 mg/dL  Borderline High:  160-189 mg/dL  High:  190-219 mg/dL  Very High:  Greater than or equal to 220 mg/dL   Hepatitis C Screen Reflex to HCV RNA Quant and Genotype   Result Value Ref Range    Hepatitis C Antibody Nonreactive Nonreactive    Narrative    Assay performance characteristics have not been established for newborns, infants, and children.   HIV Antigen Antibody Combo   Result Value Ref Range    HIV Antigen Antibody Combo Nonreactive Nonreactive      Comment:      HIV-1 p24 Ag & HIV-1/HIV-2 Ab Not Detected   GC/Chlamydia by PCR - [facility]   Result Value Ref Range    Chlamydia Trachomatis Negative Negative      Comment:      Negative for C. trachomatis genomic DNA by [employer] real-time, reverse-transcriptase PCR. A negative result does not preclude the presence of C. trachomatis = infection. The results are dependent on proper collection, transport, and processing of the specimen, and the presence of sufficient DNA to be detected.    Neisseria gonorrhoeae Negative Negative      Comment:      Negative for N. gonorrhoeae genomic DNA by [employer] real-time, reverse-transcriptase PCR. A negative result does not preclude the presence of N gonorrhoeae  infection. The results are dependent on proper collection, transport, and processing of the specimen, and the presence of sufficient DNA to be detected.    Narrative    Assay performed using [employer] real-time, reverse-transcriptase PCR.   CBC with platelets and differential   Result Value Ref Range    WBC Count 7.4 4.0 - 11.0 10e3/uL    RBC Count 4.85 3.80 - 5.20 10e6/uL    Hemoglobin 14.1 11.7 - 15.7 g/dL    Hematocrit 42.8 35.0 - 47.0 %    MCV 88 78 - 100 fL    MCH 29.1 26.5 - 33.0 pg    MCHC 32.9 31.5 - 36.5 g/dL    RDW 12.3 10.0 - 15.0 %    Platelet Count 281 150 - 450 10e3/uL    % Neutrophils 63 %    % Lymphocytes 25 %    % Monocytes 8 %    % Eosinophils 4 %    % Basophils 0 %    % Immature Granulocytes 0 %    NRBCs per 100 WBC 0 <1 /100    Absolute Neutrophils 4.6 1.6 - 8.3 10e3/uL    Absolute Lymphocytes 1.8 0.8 - 5.3 10e3/uL    Absolute Monocytes 0.6 0.0 - 1.3 10e3/uL    Absolute Eosinophils 0.3 0.0 - 0.7 10e3/uL    Absolute Basophils 0.0 0.0 - 0.2 10e3/uL    Absolute Immature Granulocytes 0.0 <=0.4 10e3/uL    Absolute NRBCs 0.0 10e3/uL       If you have any questions or concerns, please call the clinic at the number listed above.       Sincerely,      Jenae Hayward MD

## 2022-04-29 NOTE — PROGRESS NOTES
Assessment & Plan     Gastroesophageal reflux disease, unspecified whether esophagitis present  Not well controlled.  Reviewed risk factors and lifestyle modification.  On high dose PPI with inadequate relief.  Add course of Carafate.  Concern for possible stricture with food sticking.  Referral for endoscopy.  - CBC with platelets and differential; Future  - Adult General Surg Referral  - sucralfate (CARAFATE) 1 GM tablet; Take 1 tablet (1 g) by mouth 4 times daily for 14 days  - omeprazole (PRILOSEC) 40 MG DR capsule; Take 1 capsule (40 mg) by mouth daily  - CBC with platelets and differential    Morbid obesity (H)  - Vitamin D Deficiency; Future  - TSH with free T4 reflex; Future  - TSH with free T4 reflex  - Vitamin D Deficiency    Fatty liver  - Comprehensive metabolic panel (BMP + Alb, Alk Phos, ALT, AST, Total. Bili, TP); Future  - Comprehensive metabolic panel (BMP + Alb, Alk Phos, ALT, AST, Total. Bili, TP)    Prediabetes  - Comprehensive metabolic panel (BMP + Alb, Alk Phos, ALT, AST, Total. Bili, TP); Future  - Comprehensive metabolic panel (BMP + Alb, Alk Phos, ALT, AST, Total. Bili, TP)    JUAN (generalized anxiety disorder)  Social anxiety disorder  Major depressive disorder, recurrent episode, mild (H)  Stable overall.  Ongoing counseling.  Declines medication at this time.    Lipid screening  Fasting today  - Lipid Profile (Chol, Trig, HDL, LDL calc); Future  - Lipid Profile (Chol, Trig, HDL, LDL calc)    IUD check up  Would like to see GYN; due for pap as well  - Ob/Gyn Referral    Screen for STD (sexually transmitted disease)  Asymptomatic; stable relationship  - GC/Chlamydia by PCR - [facility]; Future  - HIV Antigen Antibody Combo; Future  - Hepatitis C Screen Reflex to HCV RNA Quant and Genotype; Future  - Hepatitis C Screen Reflex to HCV RNA Quant and Genotype  - HIV Antigen Antibody Combo  - GC/Chlamydia by PCR - [facility]       Patient Instructions   Will call with lab results.    Referral to general  surgeon for upper endoscopy.    Continue Prilosec.  Refills sent.  Add 2 week course of Carafate.    Referral to GYN for IUD check and pap.          Jenae Griffith MD  [facility] - RENITA Yoon is a 26 year old who presents for the following health issues     HPI     Not seen since 2019.    GC/chalmydia/hiv/hep c - agreeable    Physical/pap - overdue    Morbid obesity - due for fasting labs    Gerd/Heartburn  Duration of complaint: over 1 year    Description of symptoms:    food getting stuck: YES - feels that it takes and hour for feeling to go away after eating  nausea/vomiting: YES- nausea  abdominal pain: YES  black/tarry or bloody stools: no :  worse with spicy foods and no particular food or drink.  current NSAID/Aspirin use: YES- not daily   Therapies tried and outcome: Omeprazole (Prilosec); sometimes has to add antacids, shot of aloe  At times has vomit in mouth at night.  No prior EGD.  No alcohol    Depression and Anxiety Follow-Up    How are you doing with your depression since your last visit? Worsened     How are you doing with your anxiety since your last visit?  Worsened     Are you having other symptoms that might be associated with depression or anxiety? No    Have you had a significant life event? OTHER: mother      Do you have any concerns with your use of alcohol or other drugs? No     Prior Cymbalta 2019, 2020; Effexor XR 2019, Zoloft [id number]    Counseling - going to [facility] - hasn't helped so far    Declines restarting medication - never helped; made patient more groggy    Past 2 years - mom cut off contact with patient; now gong to conciliation court    Stress boyfriends family    Safe; good relationship    Not working; looking; large gap in employment; [employer]; reports 100 applications via [employer]    Remote SIB - nothing for years    Social History     Tobacco Use     Smoking status: Never Smoker     Smokeless tobacco: Never Used   Substance Use Topics      Alcohol use: No     Alcohol/week: 0.0 standard drinks     Drug use: No     PHQ 6/17/2019 12/20/2019 4/29/2022   PHQ-9 Total Score 23 27 22   Q9: Thoughts of better off dead/self-harm past 2 weeks More than half the days Nearly every day Several days   F/U: Thoughts of suicide or self-harm - Yes -   F/U: Self harm-plan - No -   F/U: Self-harm action - No -   F/U: Safety concerns - No -     JUAN-7 SCORE 6/17/2019 12/20/2019 4/29/2022   Total Score 21 21 19     Last PHQ-9 4/29/2022   1.  Little interest or pleasure in doing things 2   2.  Feeling down, depressed, or hopeless 2   3.  Trouble falling or staying asleep, or sleeping too much 3   4.  Feeling tired or having little energy 3   5.  Poor appetite or overeating 3   6.  Feeling bad about yourself 3   7.  Trouble concentrating 3   8.  Moving slowly or restless 2   Q9: Thoughts of better off dead/self-harm past 2 weeks 1   PHQ-9 Total Score 22   Difficulty at work, home, or with people Somewhat difficult   In the past two weeks have you had thoughts of suicide or self harm? -   Do you have concerns about your personal safety or the safety of others? -   In the past 2 weeks have you thought about a plan or had intention to harm yourself? -   In the past 2 weeks have you acted on these thoughts in any way? -     JUAN-7  4/29/2022   1. Feeling nervous, anxious, or on edge 3   2. Not being able to stop or control worrying 3   3. Worrying too much about different things 3   4. Trouble relaxing 3   5. Being so restless that it is hard to sit still 2   6. Becoming easily annoyed or irritable 2   7. Feeling afraid, as if something awful might happen 3   JUAN-7 Total Score 19   If you checked any problems, how difficult have they made it for you to do your work, take care of things at home, or get along with other people? Somewhat difficult       Suicide Assessment Five-step Evaluation and Treatment (SAFE-T)      Would like to see GYN for annual exam.  Has had IUD for 3  years or so.  Having spotting.  Unsure if IUD fell out.  More cramping.    Review of Systems   Constitutional, HEENT, cardiovascular, pulmonary, gi and gu systems are negative, except as otherwise noted.      Objective    /87 (Cuff Size: Adult Large)   Pulse 68   Temp 98.2  F (36.8  C) (Tympanic)   Resp 20   Wt (!) 174.6 kg (385 lb)   SpO2 99%   BMI 64.07 kg/m    Body mass index is 64.07 kg/m .  Physical Exam   GENERAL: alert, no distress and morbidly obese  NECK: no adenopathy, no asymmetry, masses, or scars and thyroid normal to palpation  RESP: lungs clear to auscultation - no rales, rhonchi or wheezes  CV: regular rate and rhythm, normal S1 S2, no S3 or S4, no murmur, click or rub, no peripheral edema and peripheral pulses strong  ABDOMEN: soft, nontender, no hepatosplenomegaly, no masses and bowel sounds normal  MS: no gross musculoskeletal defects noted, no edema  PSYCH: mentation appears normal, affect normal/bright    Labs pending

## 2022-04-29 NOTE — PATIENT INSTRUCTIONS
Will call with lab results.    Referral to general surgeon for upper endoscopy.    Continue Prilosec.  Refills sent.  Add 2 week course of Carafate.    Referral to GYN for IUD check and pap.

## 2022-04-30 ASSESSMENT — ANXIETY QUESTIONNAIRES: GAD7 TOTAL SCORE: 19

## 2022-05-02 LAB
DEPRECATED CALCIDIOL+CALCIFEROL SERPL-MC: 14 UG/L (ref 20–75)
HCV AB SERPL QL IA: NONREACTIVE
HIV 1+2 AB+HIV1 P24 AG SERPL QL IA: NONREACTIVE

## 2022-05-02 RX ORDER — ERGOCALCIFEROL 1.25 MG/1
50000 CAPSULE, LIQUID FILLED ORAL WEEKLY
Qty: 8 CAPSULE | Refills: 0 | Status: SHIPPED | OUTPATIENT
Start: 2022-05-02

## 2022-10-10 NOTE — PROGRESS NOTES
"  Assessment & Plan     JUAN (generalized anxiety disorder)  Significant anxiety.  Declines further counseling at this time.  Initiated trial of Prozac 20 mg daily.  Recheck in office 2 months, sooner if concerns.  - FLUoxetine (PROZAC) 20 MG capsule; Take 1 capsule (20 mg) by mouth daily  - TSH with free T4 reflex; Future  - Vitamin D Deficiency; Future    Social anxiety disorder  As above.  - FLUoxetine (PROZAC) 20 MG capsule; Take 1 capsule (20 mg) by mouth daily  - TSH with free T4 reflex; Future  - Vitamin D Deficiency; Future    Major depressive disorder, recurrent episode, mild (H)  As above.    Vitamin D deficiency  - Vitamin D Deficiency; Future    Weight loss  Unintentional, but decreased intake due to GI symptoms.  Update labs.  Referral already placed for EGD in 4/2022.   Patient given surgery number to call to schedule.  - CBC with platelets and differential; Future  - Comprehensive metabolic panel (BMP + Alb, Alk Phos, ALT, AST, Total. Bili, TP); Future  - TSH with free T4 reflex; Future  - Vitamin D Deficiency; Future  - Insulin level; Future  - Lipid Profile (Chol, Trig, HDL, LDL calc); Future      Morbid obesity   Comment: down 30 pounds    Gastroesophageal reflux disease with esophagitis without hemorrhage  As above.  Continue PPI. Labs ordered. Referral for EGD in place.  If BMI restriction - may need referral to [facility].      IUD check up  US to locate.  She will call GYN to schedule exam.  - US Pelvic Complete with Transvaginal; Future         BMI:   Estimated body mass index is 54.91 kg/m  as calculated from the following:    Height as of this encounter: 1.651 m (5' 5\").    Weight as of this encounter: 149.7 kg (330 lb).   Weight management plan: Discussed healthy diet and exercise guidelines    Patient Instructions   Continue Prilosec 40 mg daily.  Call surgery department to schedule consult for upper endoscopy.  [phone].  If unable to do locally due to BMI restrictions - notify me - and " will refer to [facility].    Start trial of Prozac 20 mg daily for mood.  Re-evaluate in 2 months.    Will call with lab results.    Call to schedule GYN exam, including IUD check.  Ultrasound ordered.    Letter for work accommodation done.      Return in about 2 months (around 12/12/2022) for depression/anxiety.    Jenae Griffith MD  [facility] - RENITA Yoon is a 26 year old, presenting for the following health issues:  Gastrophageal Reflux, Depression, and Anxiety      HPI     Flu - declined  COVID - declined    Schedule for physical - did referral to GYN 4/2022 - cannot locate her IUD.  Significant anxiety about exam.    Obesity - with down from 385 to 330 in 6 months.  Not eating due to GI symptoms.    GERD/Heartburn - did referral for EGD 4/2022- lost phone service - they called and left voicemail; letter was sent - patient states she didn't get it; Prilosec 40 mg, Carafate (didn't help)    Duration: ongoing    Description (location/character/radiation): bad acid reflux up to throat and vomiting sometimes, but not often, perhaps 5 times per month    Intensity:  moderate    Accompanying signs and symptoms:  food getting stuck: no   nausea/vomiting/blood: YES- sometimes  abdominal pain: YES-most times - epigastric - across  black/tarry or bloody stools: no   Daily normal stool  No urinary symptoms    History (similar episodes/previous evaluation): Yes currently taking prilosec but she feels it is not working anymore    Precipitating or alleviating factors:  worse with no particular food or drink.  current NSAID/Aspirin use: YES- sometimes    Therapies tried and outcome: Omeprazole (Prilosec)     NSAID - Ibuprofen - has cut back - but typically 2 times per week - 1000 mg dose    No Tylenol    No supplements    US abdomen 11/2021 - negative    Pain is constant, worse with eating    Sometimes keeps her up at night       Depression and Anxiety Follow-Up - not currently on  medicatoin    How are you doing with your depression since your last visit? Worsened is not currently working is looking for job and can not find one - states has applied to over 200 jobs    How are you doing with your anxiety since your last visit?  Worsened same looking for  Job and is unable to find one    Are you having other symptoms that might be associated with depression or anxiety? No    Have you had a significant life event? Job Concerns     Do you have any concerns with your use of alcohol or other drugs? No     Financial stress    No suicidal ideation    Tried counseling prior - not helpful    Prior cymbalta, effexor xr, zoloft - nothing since 2020 - doesn't recall much of a difference - but was in a bad situation with mom/relationship at the time    Considering job via [employer]; would need note for chair    Boyfriend - stable, safe relationship      Social History     Tobacco Use     Smoking status: Never     Smokeless tobacco: Never   Substance Use Topics     Alcohol use: No     Alcohol/week: 0.0 standard drinks     Drug use: No     PHQ 12/20/2019 4/29/2022 10/12/2022   PHQ-9 Total Score 27 22 24   Q9: Thoughts of better off dead/self-harm past 2 weeks Nearly every day Several days Nearly every day   F/U: Thoughts of suicide or self-harm Yes - -   F/U: Self harm-plan No - -   F/U: Self-harm action No - -   F/U: Safety concerns No - -     JUAN-7 SCORE 12/20/2019 4/29/2022 10/12/2022   Total Score 21 19 21     Last PHQ-9 10/12/2022   1.  Little interest or pleasure in doing things 3   2.  Feeling down, depressed, or hopeless 3   3.  Trouble falling or staying asleep, or sleeping too much 3   4.  Feeling tired or having little energy 3   5.  Poor appetite or overeating 3   6.  Feeling bad about yourself 3   7.  Trouble concentrating 3   8.  Moving slowly or restless 0   Q9: Thoughts of better off dead/self-harm past 2 weeks 3   PHQ-9 Total Score 24   Difficulty at work, home, or with people -  "  In the past two weeks have you had thoughts of suicide or self harm? -   Do you have concerns about your personal safety or the safety of others? -   In the past 2 weeks have you thought about a plan or had intention to harm yourself? -   In the past 2 weeks have you acted on these thoughts in any way? -     JUAN-7  10/12/2022   1. Feeling nervous, anxious, or on edge 3   2. Not being able to stop or control worrying 3   3. Worrying too much about different things 3   4. Trouble relaxing 3   5. Being so restless that it is hard to sit still 3   6. Becoming easily annoyed or irritable 3   7. Feeling afraid, as if something awful might happen 3   JUAN-7 Total Score 21   If you checked any problems, how difficult have they made it for you to do your work, take care of things at home, or get along with other people? -       Suicide Assessment Five-step Evaluation and Treatment (SAFE-T)        Review of Systems   Constitutional, HEENT, cardiovascular, pulmonary, gi and gu systems are negative, except as otherwise noted.      Objective    /86 (BP Location: Right arm, Patient Position: Sitting, Cuff Size: Adult Large)   Pulse 61   Temp 98  F (36.7  C) (Tympanic)   Ht 1.651 m (5' 5\")   Wt 149.7 kg (330 lb)   LMP 10/05/2022   SpO2 99%   BMI 54.91 kg/m    Body mass index is 54.91 kg/m .  Physical Exam   GENERAL: alert, no distress and obese  NECK: no adenopathy, no asymmetry, masses, or scars and thyroid normal to palpation  RESP: lungs clear to auscultation - no rales, rhonchi or wheezes  CV: regular rate and rhythm, normal S1 S2, no S3 or S4, no murmur, click or rub, no peripheral edema and peripheral pulses strong  ABDOMEN: tenderness epigastric, no organomegaly or masses and bowel sounds normal  MS: no gross musculoskeletal defects noted, no edema  PSYCH: mentation appears normal, affect normal/bright    Labs pending                "

## 2022-10-12 ENCOUNTER — OFFICE VISIT (OUTPATIENT)
Dept: FAMILY MEDICINE | Facility: OTHER | Age: 27
End: 2022-10-12
Attending: FAMILY MEDICINE
Payer: COMMERCIAL

## 2022-10-12 VITALS
OXYGEN SATURATION: 99 % | DIASTOLIC BLOOD PRESSURE: 86 MMHG | HEIGHT: 65 IN | HEART RATE: 61 BPM | SYSTOLIC BLOOD PRESSURE: 120 MMHG | BODY MASS INDEX: 48.82 KG/M2 | WEIGHT: 293 LBS | TEMPERATURE: 98 F

## 2022-10-12 DIAGNOSIS — E55.9 VITAMIN D DEFICIENCY: ICD-10-CM

## 2022-10-12 DIAGNOSIS — K21.00 GASTROESOPHAGEAL REFLUX DISEASE WITH ESOPHAGITIS WITHOUT HEMORRHAGE: ICD-10-CM

## 2022-10-12 DIAGNOSIS — F41.1 GAD (GENERALIZED ANXIETY DISORDER): Primary | ICD-10-CM

## 2022-10-12 DIAGNOSIS — F33.0 MAJOR DEPRESSIVE DISORDER, RECURRENT EPISODE, MILD (H): ICD-10-CM

## 2022-10-12 DIAGNOSIS — E66.01 MORBID OBESITY (H): ICD-10-CM

## 2022-10-12 DIAGNOSIS — F40.10 SOCIAL ANXIETY DISORDER: ICD-10-CM

## 2022-10-12 DIAGNOSIS — Z30.431 IUD CHECK UP: ICD-10-CM

## 2022-10-12 DIAGNOSIS — R63.4 WEIGHT LOSS: ICD-10-CM

## 2022-10-12 LAB
ALBUMIN SERPL BCG-MCNC: 4.2 G/DL (ref 3.5–5.2)
ALP SERPL-CCNC: 59 U/L (ref 35–104)
ALT SERPL W P-5'-P-CCNC: 15 U/L (ref 10–35)
ANION GAP SERPL CALCULATED.3IONS-SCNC: 8 MMOL/L (ref 7–15)
AST SERPL W P-5'-P-CCNC: 15 U/L (ref 10–35)
BASOPHILS # BLD AUTO: 0 10E3/UL (ref 0–0.2)
BASOPHILS NFR BLD AUTO: 0 %
BILIRUB SERPL-MCNC: 0.4 MG/DL
BUN SERPL-MCNC: 9.8 MG/DL (ref 6–20)
CALCIUM SERPL-MCNC: 9.2 MG/DL (ref 8.6–10)
CHLORIDE SERPL-SCNC: 105 MMOL/L (ref 98–107)
CHOLEST SERPL-MCNC: 176 MG/DL
CREAT SERPL-MCNC: 0.67 MG/DL (ref 0.51–0.95)
DEPRECATED HCO3 PLAS-SCNC: 25 MMOL/L (ref 22–29)
EOSINOPHIL # BLD AUTO: 0.4 10E3/UL (ref 0–0.7)
EOSINOPHIL NFR BLD AUTO: 4 %
ERYTHROCYTE [DISTWIDTH] IN BLOOD BY AUTOMATED COUNT: 12.5 % (ref 10–15)
GFR SERPL CREATININE-BSD FRML MDRD: >90 ML/MIN/1.73M2
GLUCOSE SERPL-MCNC: 111 MG/DL (ref 70–99)
HCT VFR BLD AUTO: 42.5 % (ref 35–47)
HDLC SERPL-MCNC: 44 MG/DL
HGB BLD-MCNC: 14.2 G/DL (ref 11.7–15.7)
IMM GRANULOCYTES # BLD: 0 10E3/UL
IMM GRANULOCYTES NFR BLD: 0 %
INSULIN SERPL-ACNC: 14.4 UU/ML (ref 2.6–24.9)
LDLC SERPL CALC-MCNC: 113 MG/DL
LYMPHOCYTES # BLD AUTO: 1.6 10E3/UL (ref 0.8–5.3)
LYMPHOCYTES NFR BLD AUTO: 19 %
MCH RBC QN AUTO: 29.6 PG (ref 26.5–33)
MCHC RBC AUTO-ENTMCNC: 33.4 G/DL (ref 31.5–36.5)
MCV RBC AUTO: 89 FL (ref 78–100)
MONOCYTES # BLD AUTO: 0.6 10E3/UL (ref 0–1.3)
MONOCYTES NFR BLD AUTO: 7 %
NEUTROPHILS # BLD AUTO: 6.1 10E3/UL (ref 1.6–8.3)
NEUTROPHILS NFR BLD AUTO: 70 %
NONHDLC SERPL-MCNC: 132 MG/DL
NRBC # BLD AUTO: 0 10E3/UL
NRBC BLD AUTO-RTO: 0 /100
PLATELET # BLD AUTO: 326 10E3/UL (ref 150–450)
POTASSIUM SERPL-SCNC: 4.4 MMOL/L (ref 3.4–5.3)
PROT SERPL-MCNC: 8.4 G/DL (ref 6.4–8.3)
RBC # BLD AUTO: 4.8 10E6/UL (ref 3.8–5.2)
SODIUM SERPL-SCNC: 138 MMOL/L (ref 136–145)
TRIGL SERPL-MCNC: 93 MG/DL
TSH SERPL DL<=0.005 MIU/L-ACNC: 2.14 UIU/ML (ref 0.3–4.2)
WBC # BLD AUTO: 8.8 10E3/UL (ref 4–11)

## 2022-10-12 PROCEDURE — 80053 COMPREHEN METABOLIC PANEL: CPT | Mod: ZL | Performed by: FAMILY MEDICINE

## 2022-10-12 PROCEDURE — 84443 ASSAY THYROID STIM HORMONE: CPT | Mod: ZL | Performed by: FAMILY MEDICINE

## 2022-10-12 PROCEDURE — 36415 COLL VENOUS BLD VENIPUNCTURE: CPT | Mod: ZL | Performed by: FAMILY MEDICINE

## 2022-10-12 PROCEDURE — 80061 LIPID PANEL: CPT | Mod: ZL | Performed by: FAMILY MEDICINE

## 2022-10-12 PROCEDURE — 83525 ASSAY OF INSULIN: CPT | Mod: ZL | Performed by: FAMILY MEDICINE

## 2022-10-12 PROCEDURE — 99214 OFFICE O/P EST MOD 30 MIN: CPT | Performed by: FAMILY MEDICINE

## 2022-10-12 PROCEDURE — 85025 COMPLETE CBC W/AUTO DIFF WBC: CPT | Mod: ZL | Performed by: FAMILY MEDICINE

## 2022-10-12 PROCEDURE — 82306 VITAMIN D 25 HYDROXY: CPT | Mod: ZL | Performed by: FAMILY MEDICINE

## 2022-10-12 PROCEDURE — G0463 HOSPITAL OUTPT CLINIC VISIT: HCPCS | Performed by: FAMILY MEDICINE

## 2022-10-12 ASSESSMENT — ANXIETY QUESTIONNAIRES
3. WORRYING TOO MUCH ABOUT DIFFERENT THINGS: NEARLY EVERY DAY
2. NOT BEING ABLE TO STOP OR CONTROL WORRYING: NEARLY EVERY DAY
GAD7 TOTAL SCORE: 21
GAD7 TOTAL SCORE: 21
1. FEELING NERVOUS, ANXIOUS, OR ON EDGE: NEARLY EVERY DAY
5. BEING SO RESTLESS THAT IT IS HARD TO SIT STILL: NEARLY EVERY DAY
6. BECOMING EASILY ANNOYED OR IRRITABLE: NEARLY EVERY DAY
7. FEELING AFRAID AS IF SOMETHING AWFUL MIGHT HAPPEN: NEARLY EVERY DAY
4. TROUBLE RELAXING: NEARLY EVERY DAY

## 2022-10-12 ASSESSMENT — PAIN SCALES - GENERAL: PAINLEVEL: SEVERE PAIN (6)

## 2022-10-12 ASSESSMENT — PATIENT HEALTH QUESTIONNAIRE - PHQ9: SUM OF ALL RESPONSES TO PHQ QUESTIONS 1-9: 24

## 2022-10-12 NOTE — LETTER
[facility] [place]  [address]  [place] [address]  Phone: [phone]    October 12, 2022      Re; Karrie Oliveira  [address] [place] [id number]          To whom it may concern:    RE: Karrie Oliveira    Patient was seen and treated today at our clinic for scheduled follow up.  Please allow patient to have the following work accommodation:  Use of chair- work from seated position.    Please contact me for questions or concerns.      Sincerely,        Jenae Griffith MD

## 2022-10-12 NOTE — NURSING NOTE
"Chief Complaint   Patient presents with     Gastrophageal Reflux     Depression     Anxiety       Initial /86 (BP Location: Right arm, Patient Position: Sitting, Cuff Size: Adult Large)   Pulse 61   Temp 98  F (36.7  C) (Tympanic)   Ht 1.651 m (5' 5\")   Wt 149.7 kg (330 lb)   LMP 10/05/2022   SpO2 99%   BMI 54.91 kg/m   Estimated body mass index is 54.91 kg/m  as calculated from the following:    Height as of this encounter: 1.651 m (5' 5\").    Weight as of this encounter: 149.7 kg (330 lb).  Medication Reconciliation: complete  Edilia Vicente LPN  "

## 2022-10-12 NOTE — PATIENT INSTRUCTIONS
Continue Prilosec 40 mg daily.  Call surgery department to schedule consult for upper endoscopy.  [phone].  If unable to do locally due to BMI restrictions - notify me - and will refer to [facility].    Start trial of Prozac 20 mg daily for mood.  Re-evaluate in 2 months.    Will call with lab results.    Call to schedule GYN exam, including IUD check.  Ultrasound ordered.    Letter for work accommodation done.

## 2022-10-13 LAB — DEPRECATED CALCIDIOL+CALCIFEROL SERPL-MC: 18 UG/L (ref 20–75)

## 2022-10-13 RX ORDER — ERGOCALCIFEROL 1.25 MG/1
50000 CAPSULE, LIQUID FILLED ORAL WEEKLY
Qty: 8 CAPSULE | Refills: 0 | Status: SHIPPED | OUTPATIENT
Start: 2022-10-13 | End: 2023-01-14

## 2022-10-17 ENCOUNTER — TELEPHONE (OUTPATIENT)
Dept: FAMILY MEDICINE | Facility: OTHER | Age: 27
End: 2022-10-17

## 2022-10-29 DIAGNOSIS — K21.9 GASTROESOPHAGEAL REFLUX DISEASE, UNSPECIFIED WHETHER ESOPHAGITIS PRESENT: ICD-10-CM

## 2022-10-31 ENCOUNTER — TRANSFERRED RECORDS (OUTPATIENT)
Dept: HEALTH INFORMATION MANAGEMENT | Facility: HOSPITAL | Age: 27
End: 2022-10-31

## 2022-10-31 LAB
C TRACH DNA SPEC QL PROBE+SIG AMP: NOT DETECTED
N GONORRHOEA DNA SPEC QL PROBE+SIG AMP: NOT DETECTED
SPECIMEN DESCRIP: NORMAL
SPECIMEN DESCRIPTION: NORMAL

## 2022-10-31 RX ORDER — OMEPRAZOLE 40 MG/1
40 CAPSULE, DELAYED RELEASE ORAL DAILY
Qty: 90 CAPSULE | Refills: 1 | Status: SHIPPED | OUTPATIENT
Start: 2022-10-31

## 2022-10-31 NOTE — TELEPHONE ENCOUNTER
prilosec      Last Written Prescription Date:  4/29/22  Last Fill Quantity: 90,   # refills: 1  Last Office Visit: 10/12/22  Future Office visit:    Next 5 appointments (look out 90 days)    Dec 12, 2022 10:15 AM  (Arrive by 10:00 AM)  SHORT with Jenae Hayward MD  [facility] [place] ([facility] [place] ) [address]  [place] [address]  [phone]

## 2022-11-23 ENCOUNTER — HOSPITAL ENCOUNTER (EMERGENCY)
Facility: HOSPITAL | Age: 27
Discharge: HOME | End: 2022-11-23
Attending: NURSE PRACTITIONER | Admitting: NURSE PRACTITIONER
Payer: COMMERCIAL

## 2022-11-23 ENCOUNTER — APPOINTMENT (OUTPATIENT)
Dept: GENERAL RADIOLOGY | Facility: HOSPITAL | Age: 27
End: 2022-11-23
Attending: NURSE PRACTITIONER
Payer: COMMERCIAL

## 2022-11-23 VITALS
SYSTOLIC BLOOD PRESSURE: 151 MMHG | HEART RATE: 72 BPM | DIASTOLIC BLOOD PRESSURE: 89 MMHG | TEMPERATURE: 98.5 F | OXYGEN SATURATION: 100 % | RESPIRATION RATE: 16 BRPM

## 2022-11-23 DIAGNOSIS — B34.9 VIRAL SYNDROME: Primary | ICD-10-CM

## 2022-11-23 LAB
FLUAV RNA SPEC QL NAA+PROBE: POSITIVE
FLUBV RNA RESP QL NAA+PROBE: NEGATIVE
RSV RNA SPEC NAA+PROBE: NEGATIVE
SARS-COV-2 RNA RESP QL NAA+PROBE: NEGATIVE

## 2022-11-23 PROCEDURE — 99213 OFFICE O/P EST LOW 20 MIN: CPT | Performed by: NURSE PRACTITIONER

## 2022-11-23 PROCEDURE — G0463 HOSPITAL OUTPT CLINIC VISIT: HCPCS | Mod: 25

## 2022-11-23 PROCEDURE — 71045 X-RAY EXAM CHEST 1 VIEW: CPT

## 2022-11-23 PROCEDURE — C9803 HOPD COVID-19 SPEC COLLECT: HCPCS

## 2022-11-23 PROCEDURE — 87637 SARSCOV2&INF A&B&RSV AMP PRB: CPT | Performed by: NURSE PRACTITIONER

## 2022-11-23 RX ORDER — PREDNISONE 20 MG/1
TABLET ORAL
Qty: 10 TABLET | Refills: 0 | Status: SHIPPED | OUTPATIENT
Start: 2022-11-23 | End: 2023-01-14

## 2022-11-23 ASSESSMENT — ENCOUNTER SYMPTOMS
EYE ITCHING: 0
FEVER: 1
EYE PAIN: 0
TROUBLE SWALLOWING: 0
HEADACHES: 1
EYE REDNESS: 0
EYE DISCHARGE: 0
COUGH: 1
MYALGIAS: 1
DIARRHEA: 1
PSYCHIATRIC NEGATIVE: 1
SHORTNESS OF BREATH: 1
ABDOMINAL PAIN: 0
SORE THROAT: 1
RHINORRHEA: 1
CHILLS: 1
PALPITATIONS: 0
VOMITING: 1

## 2022-11-23 ASSESSMENT — ACTIVITIES OF DAILY LIVING (ADL): ADLS_ACUITY_SCORE: 35

## 2022-11-23 NOTE — ED TRIAGE NOTES
Pt presents with nasal congestion, diarrhea, vomiting, fatigue, cough, and loss of taste and smell. Pt states off and on fever.

## 2022-11-23 NOTE — ED TRIAGE NOTES
"Pt presents with c/o nasal congestion, non-productive cough, lost of taste and smell \"for a few days\" x 1 week, pt also reports intermittent fever.       "

## 2022-11-23 NOTE — DISCHARGE INSTRUCTIONS
Prednisone as ordered    Symptomatic treatments recommended.  -Discussed that antibiotics would not help symptoms of viral URI. Education provided on symptoms of secondary bacterial infection such as new fever, chills, rigors, shortness of breath, increased work of breathing, that can occur with viral URI and need for further evaluation, if they occur.   - Ensure you are staying hydrated by drinking plenty of fluids or eating foods such as popsicles, jello, pudding.  - Honey can be soothing for sore throat  - Warm salt water gurgles can help soothe sore throat  - Rest  - Humidifier can help with congestion and help keep mucus membranes such as throat and nose from drying out.  - Sleeping slightly propped up can help with congestion and postnasal drainage that can worsen cough at bedtime.  - As long as you have never been told to take Tylenol and/or Ibuprofen you can use them to manage fever and body aches per package instructions  Make sure you eat when you take ibuprofen to avoid stomach upset.  - OTC cough medications per package instructions to help with cough. Check to see if the cough/cold medication already has acetaminophen (Tylenol) in it. If it does avoid taking additional Tylenol.  - If sudden onset of new fever, worsening symptoms return for further evaluation.  - OTC nasal steroid such as Flonase can help decrease sinus inflammation to help with congestion.  - Education provided on symptoms of post-viral bacterial infections including ear infection and pneumonia. This would require re-evaluation for treatment.    Follow-up with primary care provider or return to urgent care/ED with any worsening in condition or additional concerns.

## 2022-11-23 NOTE — ED PROVIDER NOTES
History     Chief Complaint   Patient presents with     Cough     Fever     HPI  Karrie Oliveira is a 27 year old female who presents to urgent care today ambulatory with complaints of fever, chills, congestion, rhinorrhea, sore throat, cough, diarrhea, vomiting, myalgia and headache which started a week ago.  Multiple coworkers are sick with similar symptoms.  Staying hydrated, normal output.  No rashes.  No asthma history.  Wants COVID, influenza and RSV testing.  No other concerns.    Allergies:  No Known Allergies    Problem List:    Patient Active Problem List    Diagnosis Date Noted     JUAN (generalized anxiety disorder) 09/28/2018     Priority: Medium     Panic attack 09/28/2018     Priority: Medium     Social anxiety disorder 09/28/2018     Priority: Medium     Major depressive disorder, recurrent episode, mild (H) 09/28/2018     Priority: Medium     Morbid obesity (H) 09/21/2018     Priority: Medium     Fatty liver 09/21/2018     Priority: Medium     DUB (dysfunctional uterine bleeding) 01/30/2018     Priority: Medium     PCO (polycystic ovaries) 01/30/2018     Priority: Medium     Prediabetes 09/02/2016     Priority: Medium     ACP (advance care planning) 05/12/2016     Priority: Medium     Advance Care Planning 6/29/2017: ACP Review of Chart / Resources Provided:  Reviewed chart for advance care plan.  Karrie Oliveira has no plan or code status on file. Discussed available resources and provided with information.   Added by Adilia Mendez  Advance Care Planning 5/12/2016: ACP Review of Chart / Resources Provided:  Reviewed chart for advance care plan.  Karrie Oliveira has no plan or code status on file. Discussed available resources and provided with information. Confirmed code status reflects current choices pending further ACP discussions.  Confirmed/documented legally designated decision makers.  Added by Ruby Esposito             Esophageal reflux 08/18/2015     Priority: Medium        Past  "Medical History:    Past Medical History:   Diagnosis Date     GERD (gastroesophageal reflux disease)      Obesity      Prediabetes 9/2/2016       Past Surgical History:    Past Surgical History:   Procedure Laterality Date     MYRINGOTOMY, INSERT TUBE BILATERAL, COMBINED       TONSILLECTOMY         Family History:    Family History   Problem Relation Age of Onset     Thyroid Disease Mother      Diabetes Maternal Grandmother      Thyroid Disease Maternal Grandfather      Asthma No family hx of      Coronary Artery Disease No family hx of      Hypertension No family hx of        Social History:  Marital Status:  Single [1]  Social History     Tobacco Use     Smoking status: Never     Smokeless tobacco: Never   Substance Use Topics     Alcohol use: No     Alcohol/week: 0.0 standard drinks     Drug use: No        Medications:    predniSONE (DELTASONE) 20 MG tablet  FLUoxetine (PROZAC) 20 MG capsule  ibuprofen 200 MG capsule  levonorgestrel (MIRENA) 20 MCG/24HR IUD  omeprazole (PRILOSEC) 40 MG DR capsule  vitamin D2 (ERGOCALCIFEROL) 19897 units (1250 mcg) capsule  vitamin D2 (ERGOCALCIFEROL) 92755 units (1250 mcg) capsule      Review of Systems   Constitutional: Positive for chills and fever (TMAX 102-Friday).   HENT: Positive for congestion, rhinorrhea and sore throat. Negative for ear pain and trouble swallowing.    Eyes: Negative for pain, discharge, redness and itching.   Respiratory: Positive for cough and shortness of breath (\"slightly\").    Cardiovascular: Negative for chest pain and palpitations.   Gastrointestinal: Positive for diarrhea (for the past 3 days) and vomiting (1-2 times/day). Negative for abdominal pain.   Genitourinary: Negative for decreased urine volume.   Musculoskeletal: Positive for myalgias. Negative for gait problem.   Skin: Negative.    Neurological: Positive for headaches.   Psychiatric/Behavioral: Negative.      Physical Exam   BP: 151/89  Pulse: 72  Temp: 98.5  F (36.9  C)  Resp: " 16  SpO2: 100 %    Physical Exam  Vitals and nursing note reviewed.   Constitutional:       General: She is not in acute distress.     Appearance: Normal appearance. She is ill-appearing (mild). She is not toxic-appearing.   HENT:      Head: Normocephalic.      Right Ear: Tympanic membrane, ear canal and external ear normal.      Left Ear: Tympanic membrane, ear canal and external ear normal.      Nose: Congestion and rhinorrhea present.      Mouth/Throat:      Mouth: Mucous membranes are moist.      Pharynx: Oropharynx is clear. No oropharyngeal exudate or posterior oropharyngeal erythema.   Cardiovascular:      Rate and Rhythm: Normal rate and regular rhythm.      Pulses: Normal pulses.      Heart sounds: Normal heart sounds.   Pulmonary:      Effort: Pulmonary effort is normal.      Breath sounds: Wheezing present.   Abdominal:      General: Bowel sounds are normal.      Palpations: Abdomen is soft.      Tenderness: There is no abdominal tenderness.   Skin:     General: Skin is warm and dry.      Capillary Refill: Capillary refill takes less than 2 seconds.   Neurological:      Mental Status: She is alert.   Psychiatric:         Mood and Affect: Mood normal.       ED Course     Results for orders placed or performed during the hospital encounter of 11/23/22 (from the past 24 hour(s))   Symptomatic; Unknown Influenza A/B & SARS-CoV2 (COVID-19) Virus PCR Multiplex Nasopharyngeal    Specimen: Nasopharyngeal; Swab   Result Value Ref Range    Influenza A PCR Positive (A) Negative    Influenza B PCR Negative Negative    RSV PCR Negative Negative    SARS CoV2 PCR Negative Negative    Narrative    Testing was performed using the Xpert Xpress CoV2/Flu/RSV Assay on the [employer] GeneXpert Instrument. This test should be ordered for the detection of SARS-CoV-2 and influenza viruses in individuals who meet clinical and/or epidemiological criteria. Test performance is unknown in asymptomatic patients. This test is for in vitro  diagnostic use under the FDA EUA for laboratories certified under CLIA to perform high or moderate complexity testing. This test has not been FDA cleared or approved. A negative result does not rule out the presence of PCR inhibitors in the specimen or target RNA in concentration below the limit of detection for the assay. If only one viral target is positive but coinfection with multiple targets is suspected, the sample should be re-tested with another FDA cleared, approved, or authorized test, if coinfection would change clinical management. This test was validated by the [facility]. These laboratories are certified under the Clinical Laboratory Improvement Amendments of 1988 (CLIA-88) as qualified to perform high complexity laboratory testing.   XR Chest Port 1 View    Narrative    XR CHEST PORT 1 VIEW    HISTORY: 27 yearsFemale cough, SOB, wheezing    TECHNIQUE: A single view of the chest was performed    COMPARISON: 9/14/2021    FINDINGS: Heart size and pulmonary vascularity are within normal  limits. Lungs are clear. No consolidating airspace opacities are  present.        Impression    IMPRESSION: Clear chest    MARCIA ANDREW MD         SYSTEM ID:  [id number]       Medications - No data to display    Assessments & Plan (with Medical Decision Making)     I have reviewed the nursing notes.    I have reviewed the findings, diagnosis, plan and need for follow up with the patient.  (B34.9) Viral syndrome  (primary encounter diagnosis)  Plan:   Patient ambulatory with a nontoxic appearance.  Mild wheezing noted, portable chest x-ray completed impression shows clear chest.  Will prescribe short course of prednisone.  No signs of otitis media or strep.  Staying hydrated, normal output.  No rashes.  COVID, influenza and RSV test pending.  Patient to push fluids to continue to stay hydrated.  Alternate Tylenol and ibuprofen as needed for pain or fever.  Follow-up with primary care provider or  return to urgent care/ED with any worsening in condition or additional concerns.  Patient is in agreement with treatment plan.    New Prescriptions    PREDNISONE (DELTASONE) 20 MG TABLET    Take two tablets (= 40mg) each day for 5 (five) days     Final diagnoses:   Viral syndrome     11/23/2022   [facility]     Helga Esposito NP  11/23/22 1133

## 2023-01-14 ENCOUNTER — HOSPITAL ENCOUNTER (EMERGENCY)
Facility: HOSPITAL | Age: 28
Discharge: HOME | End: 2023-01-14
Attending: EMERGENCY MEDICINE | Admitting: EMERGENCY MEDICINE
Payer: COMMERCIAL

## 2023-01-14 VITALS
OXYGEN SATURATION: 100 % | SYSTOLIC BLOOD PRESSURE: 120 MMHG | TEMPERATURE: 98 F | HEART RATE: 70 BPM | DIASTOLIC BLOOD PRESSURE: 71 MMHG | RESPIRATION RATE: 18 BRPM

## 2023-01-14 DIAGNOSIS — H66.92 LEFT OTITIS MEDIA, UNSPECIFIED OTITIS MEDIA TYPE: ICD-10-CM

## 2023-01-14 PROCEDURE — 99283 EMERGENCY DEPT VISIT LOW MDM: CPT | Performed by: EMERGENCY MEDICINE

## 2023-01-14 PROCEDURE — 99283 EMERGENCY DEPT VISIT LOW MDM: CPT

## 2023-01-14 NOTE — ED PROVIDER NOTES
EMERGENCY DEPARTMENT ENCOUNTER      NAME: Karrie Oliveira  AGE: 27 year old female  YOB: 1995  MRN: [id number]  EVALUATION DATE & TIME: 2023  7:51 AM    PCP: Jenae Hayward    ED PROVIDER: Quintin Levy M.D.      Chief Complaint   Patient presents with     Otalgia         FINAL IMPRESSION:  1. Left otitis media, unspecified otitis media type          ED COURSE & MEDICAL DECISION MAKIN year old female presents to the Emergency Department for evaluation of left ear pain.  History of recurrent otitis media affecting both the ears, tympanostomy tubes as a child.  She presents with 2 days of left ear pain.  She also has a tender lymph node anterior to her left ear.  No other signs of mastoid tenderness or anything to suggest mastoiditis or other deep space infection.  Her tympanic membranes are quite scarred bilaterally, the left may be a bit more erythematous and have a more prominent effusion on the right.  No other associated upper respiratory symptoms.  We discussed treatment plan with Augmentin for recurrent otitis media and patient was in agreement with this.  Also suggested continued NSAIDs and decongestants.  Patient was agreeable to discharge and follow-up with primary care or ENT if symptoms or not improving.    At the conclusion of the encounter I discussed the results of all of the tests and the disposition. The questions were answered. The patient or family acknowledged understanding and was agreeable with the care plan.             MEDICATIONS GIVEN IN THE EMERGENCY:  Medications - No data to display    NEW PRESCRIPTIONS STARTED AT TODAY'S ER VISIT  New Prescriptions    AMOXICILLIN-CLAVULANATE (AUGMENTIN) 875-125 MG TABLET    Take 1 tablet by mouth 2 times daily for 7 days          =================================================================    HPI    Patient information was obtained from: Patient      Karrie Oliveira is a 27 year old female who presents to this  ED today for evaluation of left ear pain.  She has a history of recurrent otitis media affecting both ears, had tubes in her ears as a child.  She presents with left ear pain x2 days.  She also notes a swollen and painful lump in front of her left ear.  She says occasionally she will have a little bit of left ear drainage during this time.  She denies any fevers.  Denies any cough shortness of breath or other respiratory symptoms.      REVIEW OF SYSTEMS   All systems reviewed and negative except as noted in HPI.    PAST MEDICAL HISTORY:  Past Medical History:   Diagnosis Date     GERD (gastroesophageal reflux disease)      Obesity      Prediabetes 9/2/2016       PAST SURGICAL HISTORY:  Past Surgical History:   Procedure Laterality Date     MYRINGOTOMY, INSERT TUBE BILATERAL, COMBINED       TONSILLECTOMY             CURRENT MEDICATIONS:    No current facility-administered medications for this encounter.     Current Outpatient Medications   Medication     amoxicillin-clavulanate (AUGMENTIN) 875-125 MG tablet     FLUoxetine (PROZAC) 20 MG capsule     ibuprofen 200 MG capsule     levonorgestrel (MIRENA) 20 MCG/24HR IUD     omeprazole (PRILOSEC) 40 MG DR capsule     vitamin D2 (ERGOCALCIFEROL) 88566 units (1250 mcg) capsule         ALLERGIES:  No Known Allergies    FAMILY HISTORY:  Family History   Problem Relation Age of Onset     Thyroid Disease Mother      Diabetes Maternal Grandmother      Thyroid Disease Maternal Grandfather      Asthma No family hx of      Coronary Artery Disease No family hx of      Hypertension No family hx of        SOCIAL HISTORY:   Social History     Socioeconomic History     Marital status: Single   Tobacco Use     Smoking status: Never     Smokeless tobacco: Never   Substance and Sexual Activity     Alcohol use: No     Alcohol/week: 0.0 standard drinks     Drug use: No     Sexual activity: Yes     Partners: Male   Other Topics Concern      Service No     Blood Transfusions Yes      Comment: Permits if needed     Caffeine Concern Yes     Comment: none     Parent/sibling w/ CABG, MI or angioplasty before 65F 55M? No   Social History Narrative    5/15: Karrie lives with her boyfriend.  No children.  Not working and not in school.        VITALS:  /71   Pulse 70   Temp 98  F (36.7  C) (Tympanic)   Resp 18   SpO2 100%     PHYSICAL EXAM    Constitutional: Well developed, Well nourished, NAD.  HENT: Normocephalic, Atraumatic. Neck Supple.  Bilateral tympanic membranes are quite significantly scarred but are intact bilaterally.  Right ear is otherwise normal.  Left ear is nontender with external manipulation.  There is a small mobile palpable lymph node anterior to the left ear.  The canal is clear.  There is a small effusion and mild erythema when compared to the right side.  Eyes: EOMI, Conjunctiva normal.  Respiratory: Breathing comfortably on room air. Speaks full sentences easily. Lungs clear to ascultation.  Cardiovascular: Normal heart rate, Regular rhythm. No peripheral edema.  Abdomen: Soft  Musculoskeletal: Good range of motion in all major joints. No major deformities noted.  Integument: Warm, Dry.  Neurologic: Alert & awake, Normal motor function, Normal sensory function, No focal deficits noted.   Psychiatric: Cooperative. Affect appropriate.          Quintin Levy M.D.  Emergency Medicine  [facility]  [address] [place] [id number] [phone]  Dept: [phone]       Quintin Levy MD  01/14/23 0830

## 2023-01-14 NOTE — DISCHARGE INSTRUCTIONS
You were seen in the emergency department at [facility] for a left ear pain.  We think your history and exam are consistent with a ear infection, these can be due to viruses or bacterial infections.  The lump in front of your ear is a inflamed lymph node probably reactive from infection.  Please continue using Tylenol and ibuprofen we are going to start you on an antibiotic to complete a 7-day course.  You can also try decongestants over-the-counter like Sudafed.  If things or not improving by that time please check in with your primary doctor or ENT specialist for follow-up.

## 2023-01-14 NOTE — ED TRIAGE NOTES
C/o left ear pain for 2 days. States she also feels a small lump inside her ear canal. Rates pain 6/10. States last took ibuprofen for the pain at 2330 last night.

## 2023-02-09 ENCOUNTER — HOSPITAL ENCOUNTER (EMERGENCY)
Facility: HOSPITAL | Age: 28
Discharge: HOME | End: 2023-02-09
Payer: COMMERCIAL

## 2023-02-09 VITALS
OXYGEN SATURATION: 98 % | TEMPERATURE: 98.5 F | SYSTOLIC BLOOD PRESSURE: 147 MMHG | DIASTOLIC BLOOD PRESSURE: 88 MMHG | HEART RATE: 67 BPM | RESPIRATION RATE: 16 BRPM

## 2023-02-09 DIAGNOSIS — N39.0 UTI (URINARY TRACT INFECTION): ICD-10-CM

## 2023-02-09 DIAGNOSIS — R10.9 RIGHT SIDED ABDOMINAL PAIN: ICD-10-CM

## 2023-02-09 DIAGNOSIS — N39.0 URINARY TRACT INFECTION WITHOUT HEMATURIA, SITE UNSPECIFIED: ICD-10-CM

## 2023-02-09 LAB
ALBUMIN SERPL BCG-MCNC: 3.8 G/DL (ref 3.5–5.2)
ALBUMIN UR-MCNC: 10 MG/DL
ALP SERPL-CCNC: 60 U/L (ref 35–104)
ALT SERPL W P-5'-P-CCNC: 12 U/L (ref 10–35)
AMYLASE SERPL-CCNC: 50 U/L (ref 28–100)
ANION GAP SERPL CALCULATED.3IONS-SCNC: 8 MMOL/L (ref 7–15)
APPEARANCE UR: ABNORMAL
AST SERPL W P-5'-P-CCNC: 13 U/L (ref 10–35)
BASOPHILS # BLD AUTO: 0 10E3/UL (ref 0–0.2)
BASOPHILS NFR BLD AUTO: 0 %
BILIRUB SERPL-MCNC: 0.3 MG/DL
BILIRUB UR QL STRIP: NEGATIVE
BUN SERPL-MCNC: 9.3 MG/DL (ref 6–20)
CALCIUM SERPL-MCNC: 9 MG/DL (ref 8.6–10)
CHLORIDE SERPL-SCNC: 106 MMOL/L (ref 98–107)
COLOR UR AUTO: YELLOW
CREAT SERPL-MCNC: 0.61 MG/DL (ref 0.51–0.95)
DEPRECATED HCO3 PLAS-SCNC: 26 MMOL/L (ref 22–29)
EOSINOPHIL # BLD AUTO: 0.2 10E3/UL (ref 0–0.7)
EOSINOPHIL NFR BLD AUTO: 3 %
ERYTHROCYTE [DISTWIDTH] IN BLOOD BY AUTOMATED COUNT: 12.3 % (ref 10–15)
GFR SERPL CREATININE-BSD FRML MDRD: >90 ML/MIN/1.73M2
GLUCOSE SERPL-MCNC: 96 MG/DL (ref 70–99)
GLUCOSE UR STRIP-MCNC: NEGATIVE MG/DL
HCT VFR BLD AUTO: 41.8 % (ref 35–47)
HGB BLD-MCNC: 13.8 G/DL (ref 11.7–15.7)
HGB UR QL STRIP: ABNORMAL
HOLD SPECIMEN: NORMAL
IMM GRANULOCYTES # BLD: 0 10E3/UL
IMM GRANULOCYTES NFR BLD: 0 %
KETONES UR STRIP-MCNC: NEGATIVE MG/DL
LEUKOCYTE ESTERASE UR QL STRIP: ABNORMAL
LIPASE SERPL-CCNC: 21 U/L (ref 13–60)
LYMPHOCYTES # BLD AUTO: 1.8 10E3/UL (ref 0.8–5.3)
LYMPHOCYTES NFR BLD AUTO: 28 %
MCH RBC QN AUTO: 29.7 PG (ref 26.5–33)
MCHC RBC AUTO-ENTMCNC: 33 G/DL (ref 31.5–36.5)
MCV RBC AUTO: 90 FL (ref 78–100)
MONOCYTES # BLD AUTO: 0.4 10E3/UL (ref 0–1.3)
MONOCYTES NFR BLD AUTO: 7 %
MUCOUS THREADS #/AREA URNS LPF: PRESENT /LPF
NEUTROPHILS # BLD AUTO: 3.9 10E3/UL (ref 1.6–8.3)
NEUTROPHILS NFR BLD AUTO: 62 %
NITRATE UR QL: NEGATIVE
NRBC # BLD AUTO: 0 10E3/UL
NRBC BLD AUTO-RTO: 0 /100
PH UR STRIP: 6.5 [PH] (ref 4.7–8)
PLATELET # BLD AUTO: 282 10E3/UL (ref 150–450)
POTASSIUM SERPL-SCNC: 4.7 MMOL/L (ref 3.4–5.3)
PROT SERPL-MCNC: 7.3 G/DL (ref 6.4–8.3)
RBC # BLD AUTO: 4.65 10E6/UL (ref 3.8–5.2)
RBC URINE: 1 /HPF
SODIUM SERPL-SCNC: 140 MMOL/L (ref 136–145)
SP GR UR STRIP: 1.03 (ref 1–1.03)
SQUAMOUS EPITHELIAL: 30 /HPF
UROBILINOGEN UR STRIP-MCNC: NORMAL MG/DL
WBC # BLD AUTO: 6.3 10E3/UL (ref 4–11)
WBC URINE: 11 /HPF

## 2023-02-09 PROCEDURE — 82150 ASSAY OF AMYLASE: CPT

## 2023-02-09 PROCEDURE — 96372 THER/PROPH/DIAG INJ SC/IM: CPT

## 2023-02-09 PROCEDURE — 81001 URINALYSIS AUTO W/SCOPE: CPT

## 2023-02-09 PROCEDURE — 80053 COMPREHEN METABOLIC PANEL: CPT

## 2023-02-09 PROCEDURE — 250N000011 HC RX IP 250 OP 636

## 2023-02-09 PROCEDURE — 85004 AUTOMATED DIFF WBC COUNT: CPT

## 2023-02-09 PROCEDURE — G0463 HOSPITAL OUTPT CLINIC VISIT: HCPCS | Mod: 25

## 2023-02-09 PROCEDURE — 36415 COLL VENOUS BLD VENIPUNCTURE: CPT

## 2023-02-09 PROCEDURE — 99214 OFFICE O/P EST MOD 30 MIN: CPT

## 2023-02-09 PROCEDURE — 83690 ASSAY OF LIPASE: CPT

## 2023-02-09 RX ORDER — CEPHALEXIN 500 MG/1
500 CAPSULE ORAL 2 TIMES DAILY
Qty: 14 CAPSULE | Refills: 0 | Status: SHIPPED | OUTPATIENT
Start: 2023-02-09 | End: 2023-02-16

## 2023-02-09 RX ORDER — KETOROLAC TROMETHAMINE 30 MG/ML
30 INJECTION, SOLUTION INTRAMUSCULAR; INTRAVENOUS ONCE
Status: COMPLETED | OUTPATIENT
Start: 2023-02-09 | End: 2023-02-09

## 2023-02-09 RX ADMIN — KETOROLAC TROMETHAMINE 30 MG: 30 INJECTION, SOLUTION INTRAMUSCULAR; INTRAVENOUS at 14:57

## 2023-02-09 ASSESSMENT — ENCOUNTER SYMPTOMS
RHINORRHEA: 0
FLANK PAIN: 1
APPETITE CHANGE: 0
VOMITING: 0
DIARRHEA: 1
ABDOMINAL PAIN: 1
HEMATURIA: 0
SORE THROAT: 0
ACTIVITY CHANGE: 1
NAUSEA: 1
CHEST TIGHTNESS: 0
COUGH: 0
DIZZINESS: 0
BACK PAIN: 1
FEVER: 1
LIGHT-HEADEDNESS: 0
DYSURIA: 0

## 2023-02-09 ASSESSMENT — ACTIVITIES OF DAILY LIVING (ADL): ADLS_ACUITY_SCORE: 35

## 2023-02-09 NOTE — ED TRIAGE NOTES
Pt presents with c/o muscle pain  Happened a week ago, states that she pulled a muscle in her stomach, and it will radiate towards her chest and lower back.pt states that the pain is slowly getting worse  States that its effecting her daily life at home, cant wash the dishes, or vacuum.  Taken ibu for pain  Pain at 7-8/10

## 2023-02-09 NOTE — ED TRIAGE NOTES
Patient presents with c/o lifting at work last Friday and pulled a muscle in her stomach, pain is progressively getting worse.

## 2023-02-09 NOTE — ED PROVIDER NOTES
History     Chief Complaint   Patient presents with     Muscle Pain     HPI  Karrie Oliveira is a 27 year old female who presents to the urgent care with c/o pain to right upper abdomen, right upper back, right shoulder pain, and chest x1 week. Karrie notes that she was doing heavy lifting at work. She notes that when she was moving the material, she had to kick something out of the way, which caused her to develop right upper abdominal pain. Pain is now radiating into her chest and back and is present at rest and exacerbated with movement. 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   Karrie does admit to some nausea last night and diarrhea x2 days (4 stools). She does have her appendix and gallbladder. Admits to a 130 lb weight loss in the last year. Denies hx of kidney stones and any hematuria, dysuria, vaginal discharge, and possibility of pregnancy. Also denies possibility of pregnancy and has an IUD. Last regular bowel movement yesterday. She has been drinking and eating per normal and denies decreased in appetite. She also notes that she had a fever of 102 yesterday.She took ibuprofen 400mg 2300 last night, has not taken tylenol. She has also applied ice without relief.     Allergies:  No Known Allergies    Problem List:    Patient Active Problem List    Diagnosis Date Noted     JUAN (generalized anxiety disorder) 09/28/2018     Priority: Medium     Panic attack 09/28/2018     Priority: Medium     Social anxiety disorder 09/28/2018     Priority: Medium     Major depressive disorder, recurrent episode, mild (H) 09/28/2018     Priority: Medium     Morbid obesity (H) 09/21/2018     Priority: Medium     Fatty liver 09/21/2018     Priority: Medium     DUB (dysfunctional uterine bleeding) 01/30/2018     Priority: Medium     PCO (polycystic ovaries) 01/30/2018     Priority:  Medium     Prediabetes 09/02/2016     Priority: Medium     ACP (advance care planning) 05/12/2016     Priority: Medium     Advance Care Planning 6/29/2017: ACP Review of Chart / Resources Provided:  Reviewed chart for advance care plan.  Karrie Oliveira has no plan or code status on file. Discussed available resources and provided with information.   Added by Adilia Mendez  Advance Care Planning 5/12/2016: ACP Review of Chart / Resources Provided:  Reviewed chart for advance care plan.  Karrie Oliveira has no plan or code status on file. Discussed available resources and provided with information. Confirmed code status reflects current choices pending further ACP discussions.  Confirmed/documented legally designated decision makers.  Added by Ruby Esposito             Esophageal reflux 08/18/2015     Priority: Medium        Past Medical History:    Past Medical History:   Diagnosis Date     GERD (gastroesophageal reflux disease)      Obesity      Prediabetes 9/2/2016       Past Surgical History:    Past Surgical History:   Procedure Laterality Date     MYRINGOTOMY, INSERT TUBE BILATERAL, COMBINED       TONSILLECTOMY         Family History:    Family History   Problem Relation Age of Onset     Thyroid Disease Mother      Diabetes Maternal Grandmother      Thyroid Disease Maternal Grandfather      Asthma No family hx of      Coronary Artery Disease No family hx of      Hypertension No family hx of        Social History:  Marital Status:  Single [1]  Social History     Tobacco Use     Smoking status: Never     Smokeless tobacco: Never   Substance Use Topics     Alcohol use: No     Alcohol/week: 0.0 standard drinks     Drug use: No        Medications:    cephALEXin (KEFLEX) 500 MG capsule  FLUoxetine (PROZAC) 20 MG capsule  ibuprofen 200 MG capsule  levonorgestrel (MIRENA) 20 MCG/24HR IUD  omeprazole (PRILOSEC) 40 MG DR capsule  vitamin D2 (ERGOCALCIFEROL) 34610 units (1250 mcg) capsule          Review of Systems    Constitutional: Positive for activity change and fever. Negative for appetite change.   HENT: Negative for congestion, ear pain, rhinorrhea and sore throat.    Respiratory: Negative for cough and chest tightness.    Cardiovascular: Positive for chest pain.   Gastrointestinal: Positive for abdominal pain, diarrhea and nausea. Negative for vomiting.   Genitourinary: Positive for flank pain (right). Negative for dysuria, hematuria, urgency, vaginal bleeding, vaginal discharge and vaginal pain.   Musculoskeletal: Positive for back pain.   Skin: Negative for rash.   Neurological: Negative for dizziness and light-headedness.   All other systems reviewed and are negative.      Physical Exam   BP: 147/88  Pulse: 67  Temp: 98.5  F (36.9  C)  Resp: 16  SpO2: 98 %      Physical Exam  Vitals and nursing note reviewed.   Constitutional:       General: She is in acute distress.      Appearance: She is obese. She is not ill-appearing.   HENT:      Right Ear: Tympanic membrane, ear canal and external ear normal.      Left Ear: Tympanic membrane, ear canal and external ear normal.      Mouth/Throat:      Mouth: Mucous membranes are moist.      Pharynx: Oropharynx is clear. No oropharyngeal exudate or posterior oropharyngeal erythema.   Cardiovascular:      Rate and Rhythm: Normal rate and regular rhythm.      Pulses: Normal pulses.      Heart sounds: Normal heart sounds. No murmur heard.  Pulmonary:      Effort: Pulmonary effort is normal. No respiratory distress.      Breath sounds: Normal breath sounds.   Abdominal:      General: Bowel sounds are normal.      Tenderness: There is abdominal tenderness in the right upper quadrant and right lower quadrant. There is right CVA tenderness. There is no left CVA tenderness, guarding or rebound. Positive signs include Evans's sign and Rovsing's sign. Negative signs include McBurney's sign.   Musculoskeletal:      Cervical back: Normal range of motion. No rigidity, tenderness or bony  tenderness.      Thoracic back: Tenderness (under scapula) present. No swelling, deformity, lacerations, spasms or bony tenderness. Normal range of motion.      Lumbar back: No bony tenderness.   Skin:     General: Skin is warm and dry.   Neurological:      General: No focal deficit present.      Mental Status: She is alert and oriented to person, place, and time.         ED Course            Procedures                  Results for orders placed or performed during the hospital encounter of 02/09/23 (from the past 24 hour(s))   UA with Microscopic reflex to Culture    Specimen: Urine, Midstream   Result Value Ref Range    Color Urine Yellow Colorless, Straw, Light Yellow, Yellow    Appearance Urine Slightly Cloudy (A) Clear    Glucose Urine Negative Negative mg/dL    Bilirubin Urine Negative Negative    Ketones Urine Negative Negative mg/dL    Specific Gravity Urine 1.031 1.003 - 1.035    Blood Urine Trace (A) Negative    pH Urine 6.5 4.7 - 8.0    Protein Albumin Urine 10 (A) Negative mg/dL    Urobilinogen Urine Normal Normal, 2.0 mg/dL    Nitrite Urine Negative Negative    Leukocyte Esterase Urine Large (A) Negative    Mucus Urine Present (A) None Seen /LPF    RBC Urine 1 <=2 /HPF    WBC Urine 11 (H) <=5 /HPF    Squamous Epithelials Urine 30 (H) <=1 /HPF   CBC with platelets differential    Narrative    The following orders were created for panel order CBC with platelets differential.  Procedure                               Abnormality         Status                     ---------                               -----------         ------                     CBC with platelets and d...[[id number]]                      Final result                 Please view results for these tests on the individual orders.   Comprehensive metabolic panel   Result Value Ref Range    Sodium 140 136 - 145 mmol/L    Potassium 4.7 3.4 - 5.3 mmol/L    Chloride 106 98 - 107 mmol/L    Carbon Dioxide (CO2) 26 22 - 29 mmol/L    Anion Gap 8 7 -  15 mmol/L    Urea Nitrogen 9.3 6.0 - 20.0 mg/dL    Creatinine 0.61 0.51 - 0.95 mg/dL    Calcium 9.0 8.6 - 10.0 mg/dL    Glucose 96 70 - 99 mg/dL    Alkaline Phosphatase 60 35 - 104 U/L    AST 13 10 - 35 U/L    ALT 12 10 - 35 U/L    Protein Total 7.3 6.4 - 8.3 g/dL    Albumin 3.8 3.5 - 5.2 g/dL    Bilirubin Total 0.3 <=1.2 mg/dL    GFR Estimate >90 >60 mL/min/1.73m2   Lipase   Result Value Ref Range    Lipase 21 13 - 60 U/L   Amylase   Result Value Ref Range    Amylase 50 28 - 100 U/L   CBC with platelets and differential   Result Value Ref Range    WBC Count 6.3 4.0 - 11.0 10e3/uL    RBC Count 4.65 3.80 - 5.20 10e6/uL    Hemoglobin 13.8 11.7 - 15.7 g/dL    Hematocrit 41.8 35.0 - 47.0 %    MCV 90 78 - 100 fL    MCH 29.7 26.5 - 33.0 pg    MCHC 33.0 31.5 - 36.5 g/dL    RDW 12.3 10.0 - 15.0 %    Platelet Count 282 150 - 450 10e3/uL    % Neutrophils 62 %    % Lymphocytes 28 %    % Monocytes 7 %    % Eosinophils 3 %    % Basophils 0 %    % Immature Granulocytes 0 %    NRBCs per 100 WBC 0 <1 /100    Absolute Neutrophils 3.9 1.6 - 8.3 10e3/uL    Absolute Lymphocytes 1.8 0.8 - 5.3 10e3/uL    Absolute Monocytes 0.4 0.0 - 1.3 10e3/uL    Absolute Eosinophils 0.2 0.0 - 0.7 10e3/uL    Absolute Basophils 0.0 0.0 - 0.2 10e3/uL    Absolute Immature Granulocytes 0.0 <=0.4 10e3/uL    Absolute NRBCs 0.0 10e3/uL   Extra Tube    Narrative    The following orders were created for panel order Extra Tube.  Procedure                               Abnormality         Status                     ---------                               -----------         ------                     Extra Blue Top Tube[[id number]]                              Final result               Extra Red Top Tube[[id number]]                               Final result               Extra Heparinized Syringe[[id number]]                        Final result                 Please view results for these tests on the individual orders.   Extra Blue Top Tube   Result Value Ref  Range    Hold Specimen JIC    Extra Red Top Tube   Result Value Ref Range    Hold Specimen JIC    Extra Heparinized Syringe   Result Value Ref Range    Hold Specimen JIC        Medications   ketorolac (TORADOL) injection 30 mg (30 mg Intramuscular Given 2/9/23 1294)       Assessments & Plan (with Medical Decision Making)     Karrie Oliveira is a 27 year old female who presents to the urgent care with c/o pain to right upper abdomen, right upper back, right shoulder pain, and chest x1 week. Karrie notes that she was doing heavy lifting at work. She notes that when she was moving the material, she had to kick something out of the way, which caused her to develop right upper abdominal pain. Pain is now radiating into her chest and back and is present at rest and exacerbated with movement. 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   Karrie does admit to some nausea last night and diarrhea x2 days (4 stools). She does have her appendix and gallbladder. Admits to a 130 lb weight loss in the last year. Denies hx of kidney stones and any hematuria, dysuria, vaginal discharge, and possibility of pregnancy. Also denies possibility of pregnancy and has an IUD. Last regular bowel movement yesterday. She has been drinking and eating per normal and denies decreased in appetite. She also notes that she had a fever of 102 yesterday.She took ibuprofen 400mg 2300 last night, has not taken tylenol. She has also applied ice without relief.     (N39.0) UTI (urinary tract infection), (R10.9) Right sided abdominal pain    Plan: Cephalexin twice daily for 10 days to treat UTI. Increase fluid. Tylenol and ibuprofen as needed for pain and fevers. Ice or heat. Return with any worsening of symptoms, fevers, or concerns.     MDM: Urine positive for infection with large leukocyte esterase and 11 WBCs  present. Not a great quality sample with 30 squamous cells but right sided flank pain present. CBC, CMP, amylase, and lipase and WNL. Karrie did have a positive Evans's sign with tenderness over McBurney's point and positive Rovsing's sign. She declined further workup including imaging and EKG. Thorough conversation regarding not being able to fully rule out appendix vs MSK without imaging. Continues to decline imaging at this time. Discussed importance of returning to ED/UC with any concerns or changes in symptoms. Understanding was verbalized. 30 mg of IM Toradol given in the UC with some improvement in pain on discharge.             I have reviewed the nursing notes.    I have reviewed the findings, diagnosis, plan and need for follow up with the patient.          Discharge Medication List as of 2/9/2023  2:57 PM      START taking these medications    Details   cephALEXin (KEFLEX) 500 MG capsule Take 1 capsule (500 mg) by mouth 2 times daily for 7 days, Disp-14 capsule, R-0, E-Prescribe             Final diagnoses:   UTI (urinary tract infection)   Right sided abdominal pain       2/9/2023   [facility]     Bhavana Bazan NP  02/09/23 1542       Bhavana Bazan NP  02/09/23 1627

## 2023-02-09 NOTE — DISCHARGE INSTRUCTIONS
If your pain worsens or your fevers persist, please be reevaluated.     Increase fluids.     You can try ice or heat, whichever feels good.     Tylenol 500-1000mg every 4-6 hours with a max dose of 4000 mg daily along with ibuprofen 600-800mg three times daily.   
No

## 2023-04-23 ENCOUNTER — NURSE TRIAGE (OUTPATIENT)
Dept: NURSING | Facility: CLINIC | Age: 28
End: 2023-04-23

## 2023-04-23 ENCOUNTER — HOSPITAL ENCOUNTER (EMERGENCY)
Facility: HOSPITAL | Age: 28
Discharge: HOME | End: 2023-04-23
Attending: STUDENT IN AN ORGANIZED HEALTH CARE EDUCATION/TRAINING PROGRAM | Admitting: STUDENT IN AN ORGANIZED HEALTH CARE EDUCATION/TRAINING PROGRAM
Payer: COMMERCIAL

## 2023-04-23 VITALS
OXYGEN SATURATION: 100 % | TEMPERATURE: 97.2 F | HEART RATE: 66 BPM | RESPIRATION RATE: 20 BRPM | SYSTOLIC BLOOD PRESSURE: 147 MMHG | DIASTOLIC BLOOD PRESSURE: 98 MMHG

## 2023-04-23 DIAGNOSIS — J06.9 UPPER RESPIRATORY TRACT INFECTION, UNSPECIFIED TYPE: ICD-10-CM

## 2023-04-23 LAB
FLUAV RNA SPEC QL NAA+PROBE: NEGATIVE
FLUBV RNA RESP QL NAA+PROBE: NEGATIVE
RSV RNA SPEC NAA+PROBE: NEGATIVE
SARS-COV-2 RNA RESP QL NAA+PROBE: NEGATIVE

## 2023-04-23 PROCEDURE — 99283 EMERGENCY DEPT VISIT LOW MDM: CPT | Mod: CS | Performed by: STUDENT IN AN ORGANIZED HEALTH CARE EDUCATION/TRAINING PROGRAM

## 2023-04-23 PROCEDURE — 99283 EMERGENCY DEPT VISIT LOW MDM: CPT | Mod: CS

## 2023-04-23 PROCEDURE — 87637 SARSCOV2&INF A&B&RSV AMP PRB: CPT | Performed by: STUDENT IN AN ORGANIZED HEALTH CARE EDUCATION/TRAINING PROGRAM

## 2023-04-23 PROCEDURE — C9803 HOPD COVID-19 SPEC COLLECT: HCPCS

## 2023-04-23 ASSESSMENT — ACTIVITIES OF DAILY LIVING (ADL): ADLS_ACUITY_SCORE: 33

## 2023-04-23 NOTE — DISCHARGE INSTRUCTIONS
Return to the emergency department for worsening symptoms or new concerning symptoms follow-up with your primary care provider for ongoing symptoms.  Use ibuprofen and Tylenol for pain control and fever control.  Follow-up your results Bevt

## 2023-04-23 NOTE — TELEPHONE ENCOUNTER
Telephone Call -  Was at the ER in the Morning   Looking for her test results     Flu, RSV and COVID 19 are ALL NEGATIVE    No further questions   Shelby Chapa RN [facility] Nurse Advisor,  5:28 PM 4/23/2023

## 2023-04-23 NOTE — ED TRIAGE NOTES
Patient has has migraine,body aches low back pain and chills for 2 days. States she has slight cough and slight sore throat. No respiratory difficulty noted.wants covid test.     Triage Assessment     Row Name 04/23/23 9760       Triage Assessment (Adult)    Airway WDL WDL       Respiratory WDL    Respiratory WDL WDL       Skin Circulation/Temperature WDL    Skin Circulation/Temperature WDL WDL       Cardiac WDL    Cardiac WDL WDL       Peripheral/Neurovascular WDL    Peripheral Neurovascular WDL WDL       Cognitive/Neuro/Behavioral WDL    Cognitive/Neuro/Behavioral WDL WDL

## 2023-04-23 NOTE — Clinical Note
Karrie Oliveira was seen and treated in our emergency department on 4/23/2023.  She may return to work on 04/25/2023.  Listed return date is only an estimate.  She can return to work 24 hours after being fever free without ibuprofen or Tylenol     If you have any questions or concerns, please don't hesitate to call.      Korey Tang MD

## 2023-04-23 NOTE — ED PROVIDER NOTES
History     Chief Complaint   Patient presents with     URI     HPI  Karrie Oliveira is a 27 year old female with no ROM past medical history presents to the emergency department today complaining of fevers and chills of sore throat or runny nose cough, all of which started mostly overnight and may be a little bit more the day before.  She doubts pregnancy.  No urinary symptoms no abdominal pain no chest pain.  Sometimes she feels a little short of breath.  No other complaints.    Allergies:  No Known Allergies    Problem List:    Patient Active Problem List    Diagnosis Date Noted     JUAN (generalized anxiety disorder) 09/28/2018     Priority: Medium     Panic attack 09/28/2018     Priority: Medium     Social anxiety disorder 09/28/2018     Priority: Medium     Major depressive disorder, recurrent episode, mild (H) 09/28/2018     Priority: Medium     Morbid obesity (H) 09/21/2018     Priority: Medium     Fatty liver 09/21/2018     Priority: Medium     DUB (dysfunctional uterine bleeding) 01/30/2018     Priority: Medium     PCO (polycystic ovaries) 01/30/2018     Priority: Medium     Prediabetes 09/02/2016     Priority: Medium     ACP (advance care planning) 05/12/2016     Priority: Medium     Advance Care Planning 6/29/2017: ACP Review of Chart / Resources Provided:  Reviewed chart for advance care plan.  Karrie Oliveira has no plan or code status on file. Discussed available resources and provided with information.   Added by Adilia Mendez  Advance Care Planning 5/12/2016: ACP Review of Chart / Resources Provided:  Reviewed chart for advance care plan.  Karrie Oliveira has no plan or code status on file. Discussed available resources and provided with information. Confirmed code status reflects current choices pending further ACP discussions.  Confirmed/documented legally designated decision makers.  Added by Ruby Esposito             Esophageal reflux 08/18/2015     Priority: Medium        Past Medical  History:    Past Medical History:   Diagnosis Date     GERD (gastroesophageal reflux disease)      Obesity      Prediabetes 9/2/2016       Past Surgical History:    Past Surgical History:   Procedure Laterality Date     MYRINGOTOMY, INSERT TUBE BILATERAL, COMBINED       TONSILLECTOMY         Family History:    Family History   Problem Relation Age of Onset     Thyroid Disease Mother      Diabetes Maternal Grandmother      Thyroid Disease Maternal Grandfather      Asthma No family hx of      Coronary Artery Disease No family hx of      Hypertension No family hx of        Social History:  Marital Status:  Single [1]  Social History     Tobacco Use     Smoking status: Never     Smokeless tobacco: Never   Substance Use Topics     Alcohol use: No     Alcohol/week: 0.0 standard drinks of alcohol     Drug use: No        Medications:    FLUoxetine (PROZAC) 20 MG capsule  ibuprofen 200 MG capsule  levonorgestrel (MIRENA) 20 MCG/24HR IUD  omeprazole (PRILOSEC) 40 MG DR capsule  vitamin D2 (ERGOCALCIFEROL) 25223 units (1250 mcg) capsule          Review of Systems  A complete review of systems was performed and is otherwise negative.     Physical Exam   BP: 147/98  Pulse: 66  Temp: 97.2  F (36.2  C)  Resp: 20  SpO2: 100 %      Physical Exam  Constitutional: Alert and conversant. NAD   HENT: NCAT   Eyes: Normal pupils   Neck: supple   CV: Normal rate, regular rhythm, no murmur   Pulmonary/Chest: Non-labored respirations, clear to auscultation bilaterally   Abdominal: Soft, non-tender, non-distended   MSK: BURGESS.   Neuro: Alert and appropriate   Skin: Warm and dry. No diaphoresis. No rashes on exposed skin    Psych: Appropriate mood and affect     ED Course              ED Course as of 04/23/23 0506   Sun Apr 23, 2023   0502 Differential includes but is not limited to PNA, CHF, COPD exacerbation, pulmonary edema, pulmonary fibrosis, rhinorrhea, strep pharyngitis, viral URI, Pertussis, bronchitis, GERD/LPreflux, environmental  irritant, foreign body   Based on patient's history and presentation, duration of symptoms, most likely etiology is a viral upper respiratory infection.  Physical exam reassuring, patient is well-appearing, pulmonary exam acceptable and at baseline.  No imaging is indicated in this presentation due to the mild severity, reassuring vitals and the duration of symptoms, doubt bacterial source at this time, no hx of double sickening. No hx or exam fidings to suggest PNA, CHF, COPD exacerbation, pulmonary edema, fibrosis.  Pt offered viral testing and accetped.   Antipyretic not given symptoms and temp.  Pt appropriate for further outpatient management, discharged  in stable condition with all questions answered and return precautions given.     Procedures       No results found for this or any previous visit (from the past 24 hour(s)).    Medications - No data to display    Assessments & Plan (with Medical Decision Making)     I have reviewed the nursing notes.    I have reviewed the findings, diagnosis, plan and need for follow up with the patient.      New Prescriptions    No medications on file       Final diagnoses:   Upper respiratory tract infection, unspecified type       4/23/2023   [facility]     Korey Tang MD  04/23/23 6881

## 2023-08-31 ENCOUNTER — TRANSFERRED RECORDS (OUTPATIENT)
Dept: HEALTH INFORMATION MANAGEMENT | Facility: HOSPITAL | Age: 28
End: 2023-08-31

## 2023-08-31 LAB
HPV ABSTRACT: NORMAL
PAP-ABSTRACT: NORMAL

## 2024-02-04 ENCOUNTER — HOSPITAL ENCOUNTER (EMERGENCY)
Facility: HOSPITAL | Age: 29
Discharge: HOME | End: 2024-02-04
Attending: NURSE PRACTITIONER | Admitting: NURSE PRACTITIONER

## 2024-02-04 VITALS
RESPIRATION RATE: 20 BRPM | HEART RATE: 79 BPM | WEIGHT: 293 LBS | TEMPERATURE: 98.6 F | SYSTOLIC BLOOD PRESSURE: 155 MMHG | HEIGHT: 65 IN | DIASTOLIC BLOOD PRESSURE: 94 MMHG | OXYGEN SATURATION: 99 % | BODY MASS INDEX: 48.82 KG/M2

## 2024-02-04 DIAGNOSIS — N30.00 ACUTE CYSTITIS WITHOUT HEMATURIA: Primary | ICD-10-CM

## 2024-02-04 DIAGNOSIS — B34.9 ACUTE VIRAL SYNDROME: ICD-10-CM

## 2024-02-04 LAB
ALBUMIN UR-MCNC: NEGATIVE MG/DL
APPEARANCE UR: CLEAR
BILIRUB UR QL STRIP: NEGATIVE
CLUE CELLS: ABNORMAL
COLOR UR AUTO: YELLOW
GLUCOSE UR STRIP-MCNC: NEGATIVE MG/DL
HGB UR QL STRIP: NEGATIVE
KETONES UR STRIP-MCNC: NEGATIVE MG/DL
LEUKOCYTE ESTERASE UR QL STRIP: ABNORMAL
MUCOUS THREADS #/AREA URNS LPF: PRESENT /LPF
NITRATE UR QL: NEGATIVE
PH UR STRIP: 5.5 [PH] (ref 4.7–8)
RBC URINE: 1 /HPF
SP GR UR STRIP: 1.02 (ref 1–1.03)
SQUAMOUS EPITHELIAL: 5 /HPF
TRICHOMONAS, WET PREP: ABNORMAL
UROBILINOGEN UR STRIP-MCNC: NORMAL MG/DL
WBC URINE: 6 /HPF
WBC'S/HIGH POWER FIELD, WET PREP: ABNORMAL
YEAST, WET PREP: ABNORMAL

## 2024-02-04 PROCEDURE — 87210 SMEAR WET MOUNT SALINE/INK: CPT | Performed by: NURSE PRACTITIONER

## 2024-02-04 PROCEDURE — G0463 HOSPITAL OUTPT CLINIC VISIT: HCPCS | Mod: 25

## 2024-02-04 PROCEDURE — 250N000011 HC RX IP 250 OP 636: Performed by: NURSE PRACTITIONER

## 2024-02-04 PROCEDURE — 81001 URINALYSIS AUTO W/SCOPE: CPT | Performed by: NURSE PRACTITIONER

## 2024-02-04 PROCEDURE — 96372 THER/PROPH/DIAG INJ SC/IM: CPT | Performed by: NURSE PRACTITIONER

## 2024-02-04 PROCEDURE — 99213 OFFICE O/P EST LOW 20 MIN: CPT | Performed by: NURSE PRACTITIONER

## 2024-02-04 PROCEDURE — 87086 URINE CULTURE/COLONY COUNT: CPT | Performed by: NURSE PRACTITIONER

## 2024-02-04 RX ORDER — KETOROLAC TROMETHAMINE 30 MG/ML
30 INJECTION, SOLUTION INTRAMUSCULAR; INTRAVENOUS ONCE
Status: COMPLETED | OUTPATIENT
Start: 2024-02-04 | End: 2024-02-04

## 2024-02-04 RX ORDER — CEPHALEXIN 500 MG/1
500 CAPSULE ORAL 2 TIMES DAILY
Qty: 20 CAPSULE | Refills: 0 | Status: SHIPPED | OUTPATIENT
Start: 2024-02-04 | End: 2024-02-11

## 2024-02-04 RX ADMIN — KETOROLAC TROMETHAMINE 30 MG: 30 INJECTION, SOLUTION INTRAMUSCULAR; INTRAVENOUS at 19:56

## 2024-02-04 ASSESSMENT — ENCOUNTER SYMPTOMS
NAUSEA: 0
EYE REDNESS: 0
ABDOMINAL PAIN: 0
DYSURIA: 1
FLANK PAIN: 0
HEADACHES: 1
FREQUENCY: 1
MYALGIAS: 0
VOMITING: 0
EYE DISCHARGE: 0
COUGH: 1
FEVER: 1
PSYCHIATRIC NEGATIVE: 1
SORE THROAT: 1
TROUBLE SWALLOWING: 0
HEMATURIA: 0
CHILLS: 0
DIARRHEA: 1
RHINORRHEA: 1
SHORTNESS OF BREATH: 0

## 2024-02-05 NOTE — DISCHARGE INSTRUCTIONS
Cephalexin as ordered  - Take entire course of antibiotic even if you start to feel better.  - Antibiotics can cause stomach upset including nausea and diarrhea. Read your bottle or ask the pharmacist if antibiotic can be taken with food to help prevent nausea. If you have symptoms of diarrhea you can take an over-the-counter probiotic and/or increase foods with probiotics such as yogurt, [place], sauerkraut.    Symptomatic treatments recommended.  - Ensure you are staying hydrated by drinking plenty of fluids or eating foods such as popsicles, jello, pudding.  - Honey can be soothing for sore throat  - Warm salt water gurgles can help soothe sore throat  - Rest  - Humidifier can help with congestion and help keep mucus membranes such as throat and nose from drying out.  - Sleeping slightly propped up can help with congestion and postnasal drainage that can worsen cough at bedtime.  - As long as you have never been told to take Tylenol and/or Ibuprofen you can use them to manage fever and body aches per package instructions  Make sure you eat when you take ibuprofen to avoid stomach upset.  - OTC cough medications per package instructions to help with cough. Check to see if the cough/cold medication already has acetaminophen (Tylenol) in it. If it does avoid taking additional Tylenol.  - If sudden onset of new fever, worsening symptoms return for further evaluation.  - OTC nasal steroid such as Flonase can help decrease sinus inflammation to help with congestion.  - Education provided on symptoms of post-viral bacterial infections including ear infection and pneumonia. This would require re-evaluation for treatment.    Follow-up with primary care provider or return to urgent care/ED with any worsening in condition or additional concerns   No

## 2024-02-05 NOTE — ED PROVIDER NOTES
History     Chief Complaint   Patient presents with    Cold Symptoms    Dysuria     HPI  Karrie Oliveira is a 28 year old female who presents urgent care today ambulatory with complaints of fever, congestion, ear pain, rhinorrhea, sore throat, cough, diarrhea and headache which started yesterday.  Boyfriend sick with similar symptoms.  Patient also complaining of dysuria, frequency and bladder pressure which started on Monday.  Denies any hematuria.  Denies any genital sores, rashes, redness or irritation.  Denies any risk of STDs.  APAP for symptoms, no other OTC meds.  No other concerns.    Allergies:  No Known Allergies    Problem List:    Patient Active Problem List    Diagnosis Date Noted    JUAN (generalized anxiety disorder) 09/28/2018     Priority: Medium    Panic attack 09/28/2018     Priority: Medium    Social anxiety disorder 09/28/2018     Priority: Medium    Major depressive disorder, recurrent episode, mild (H24) 09/28/2018     Priority: Medium    Morbid obesity (H) 09/21/2018     Priority: Medium    Fatty liver 09/21/2018     Priority: Medium    DUB (dysfunctional uterine bleeding) 01/30/2018     Priority: Medium    PCO (polycystic ovaries) 01/30/2018     Priority: Medium    Prediabetes 09/02/2016     Priority: Medium    ACP (advance care planning) 05/12/2016     Priority: Medium     Advance Care Planning 6/29/2017: ACP Review of Chart / Resources Provided:  Reviewed chart for advance care plan.  Karrie Oliveira has no plan or code status on file. Discussed available resources and provided with information.   Added by Adilia Mendez  Advance Care Planning 5/12/2016: ACP Review of Chart / Resources Provided:  Reviewed chart for advance care plan.  Karrie Oliveira has no plan or code status on file. Discussed available resources and provided with information. Confirmed code status reflects current choices pending further ACP discussions.  Confirmed/documented legally designated decision makers.   Added by Ruby Esposito            Esophageal reflux 08/18/2015     Priority: Medium        Past Medical History:    Past Medical History:   Diagnosis Date    GERD (gastroesophageal reflux disease)     Obesity     Prediabetes 9/2/2016       Past Surgical History:    Past Surgical History:   Procedure Laterality Date    MYRINGOTOMY, INSERT TUBE BILATERAL, COMBINED      TONSILLECTOMY         Family History:    Family History   Problem Relation Age of Onset    Thyroid Disease Mother     Diabetes Maternal Grandmother     Thyroid Disease Maternal Grandfather     Asthma No family hx of     Coronary Artery Disease No family hx of     Hypertension No family hx of        Social History:  Marital Status:  Single [1]  Social History     Tobacco Use    Smoking status: Never    Smokeless tobacco: Never   Substance Use Topics    Alcohol use: No     Alcohol/week: 0.0 standard drinks of alcohol    Drug use: No        Medications:    cephALEXin (KEFLEX) 500 MG capsule  FLUoxetine (PROZAC) 20 MG capsule  ibuprofen 200 MG capsule  levonorgestrel (MIRENA) 20 MCG/24HR IUD  omeprazole (PRILOSEC) 40 MG DR capsule  vitamin D2 (ERGOCALCIFEROL) 35129 units (1250 mcg) capsule      Review of Systems   Constitutional:  Positive for fever. Negative for chills.   HENT:  Positive for congestion, ear pain, rhinorrhea and sore throat. Negative for trouble swallowing.    Eyes:  Negative for discharge and redness.   Respiratory:  Positive for cough. Negative for shortness of breath.    Cardiovascular:  Negative for chest pain.   Gastrointestinal:  Positive for diarrhea. Negative for abdominal pain, nausea and vomiting.   Genitourinary:  Positive for dysuria, frequency and pelvic pain (Pressure). Negative for decreased urine volume, flank pain, genital sores, hematuria, vaginal bleeding, vaginal discharge and vaginal pain.   Musculoskeletal:  Negative for myalgias.   Skin:  Negative for rash.   Neurological:  Positive for headaches.  "  Psychiatric/Behavioral: Negative.       Physical Exam   BP: 155/94  Pulse: 79  Temp: 98.6  F (37  C)  Resp: 20  Height: 165.1 cm (5' 5\")  Weight: 145.2 kg (320 lb)  SpO2: 99 %    Physical Exam  Vitals and nursing note reviewed.   Constitutional:       General: She is not in acute distress.     Appearance: Normal appearance. She is not ill-appearing or toxic-appearing.   HENT:      Right Ear: Tympanic membrane, ear canal and external ear normal.      Left Ear: Tympanic membrane, ear canal and external ear normal.      Nose: Congestion and rhinorrhea present.      Mouth/Throat:      Mouth: Mucous membranes are moist.      Pharynx: Oropharynx is clear. No oropharyngeal exudate or posterior oropharyngeal erythema.   Cardiovascular:      Rate and Rhythm: Normal rate and regular rhythm.      Pulses: Normal pulses.      Heart sounds: Normal heart sounds.   Pulmonary:      Effort: Pulmonary effort is normal.      Breath sounds: Normal breath sounds.   Abdominal:      General: Bowel sounds are normal.      Palpations: Abdomen is soft.      Tenderness: There is no abdominal tenderness. There is no right CVA tenderness or left CVA tenderness.   Musculoskeletal:      Cervical back: Normal range of motion and neck supple.   Skin:     General: Skin is warm and dry.      Capillary Refill: Capillary refill takes less than 2 seconds.   Neurological:      Mental Status: She is alert.   Psychiatric:         Mood and Affect: Mood normal.       ED Course     Results for orders placed or performed during the hospital encounter of 02/04/24 (from the past 24 hour(s))   UA with Microscopic reflex to Culture    Specimen: Urine, Clean Catch   Result Value Ref Range    Color Urine Yellow Colorless, Straw, Light Yellow, Yellow    Appearance Urine Clear Clear    Glucose Urine Negative Negative mg/dL    Bilirubin Urine Negative Negative    Ketones Urine Negative Negative mg/dL    Specific Gravity Urine 1.025 1.003 - 1.035    Blood Urine " Negative Negative    pH Urine 5.5 4.7 - 8.0    Protein Albumin Urine Negative Negative mg/dL    Urobilinogen Urine Normal Normal, 2.0 mg/dL    Nitrite Urine Negative Negative    Leukocyte Esterase Urine Large (A) Negative    Mucus Urine Present (A) None Seen /LPF    RBC Urine 1 <=2 /HPF    WBC Urine 6 (H) <=5 /HPF    Squamous Epithelials Urine 5 (H) <=1 /HPF    Narrative    Urine Culture ordered based on laboratory criteria   Wet prep    Specimen: Vagina; Swab   Result Value Ref Range    Trichomonas Absent Absent    Yeast Absent Absent    Clue Cells Absent Absent    WBCs/high power field 2+ (A) None       Medications   ketorolac (TORADOL) injection 30 mg (30 mg Intramuscular $Given 2/4/24 1956)     Assessments & Plan (with Medical Decision Making)     I have reviewed the nursing notes.    I have reviewed the findings, diagnosis, plan and need for follow up with the patient.  (N30.00) Acute cystitis without hematuria  (primary encounter diagnosis)  Plan:   Patient ambulatory with a nontoxic appearance.  Lungs clear throughout.  No signs of otitis media or strep.  Staying hydrated.  No rashes.  Declines COVID, influenza or RSV testing at this time.  Symptomatic treatment recommendations provided such as alternating Tylenol and ibuprofen as needed for pain or fever.  Push fluids.  Over-the-counter cough medication as needed for cough.  Over-the-counter Flonase as needed for nasal congestion.  Wet prep normal.  UA indicates urinary tract infection, will treat with cephalexin.  Push fluids.  Follow-up with primary care provider or return to urgent care/ED with any worsening in condition or additional concerns.  Patient in agreement with treatment plan.    New Prescriptions    CEPHALEXIN (KEFLEX) 500 MG CAPSULE    Take 1 capsule (500 mg) by mouth 2 times daily for 7 days     Final diagnoses:   Acute cystitis without hematuria   Acute viral syndrome     2/4/2024   [facility]       Helga Esposito NP  02/04/24  2036

## 2024-02-05 NOTE — ED TRIAGE NOTES
Pt presents with c/o nausea, diarrhea, coughing, runny nose congestion, sore throat, symptom onset was yesterday    Additional c/o Urinary frequency, dysuria, symptom onset was Monday. Pt reports highest fever of 101.

## 2024-02-06 LAB — BACTERIA UR CULT: NORMAL

## 2024-09-17 ENCOUNTER — APPOINTMENT (OUTPATIENT)
Dept: GENERAL RADIOLOGY | Facility: HOSPITAL | Age: 29
End: 2024-09-17

## 2024-09-17 ENCOUNTER — HOSPITAL ENCOUNTER (EMERGENCY)
Facility: HOSPITAL | Age: 29
Discharge: HOME | End: 2024-09-17

## 2024-09-17 VITALS
OXYGEN SATURATION: 98 % | TEMPERATURE: 97.5 F | HEART RATE: 68 BPM | RESPIRATION RATE: 19 BRPM | DIASTOLIC BLOOD PRESSURE: 85 MMHG | SYSTOLIC BLOOD PRESSURE: 147 MMHG

## 2024-09-17 DIAGNOSIS — M54.9 UPPER BACK PAIN ON LEFT SIDE: ICD-10-CM

## 2024-09-17 PROCEDURE — G0463 HOSPITAL OUTPT CLINIC VISIT: HCPCS | Mod: 25

## 2024-09-17 PROCEDURE — 71046 X-RAY EXAM CHEST 2 VIEWS: CPT

## 2024-09-17 PROCEDURE — 250N000013 HC RX MED GY IP 250 OP 250 PS 637

## 2024-09-17 PROCEDURE — 99213 OFFICE O/P EST LOW 20 MIN: CPT

## 2024-09-17 PROCEDURE — 96372 THER/PROPH/DIAG INJ SC/IM: CPT

## 2024-09-17 PROCEDURE — 250N000011 HC RX IP 250 OP 636

## 2024-09-17 RX ORDER — LIDOCAINE 4 G/G
1 PATCH TOPICAL ONCE
Status: DISCONTINUED | OUTPATIENT
Start: 2024-09-17 | End: 2024-09-17 | Stop reason: HOSPADM

## 2024-09-17 RX ORDER — KETOROLAC TROMETHAMINE 30 MG/ML
30 INJECTION, SOLUTION INTRAMUSCULAR; INTRAVENOUS ONCE
Status: COMPLETED | OUTPATIENT
Start: 2024-09-17 | End: 2024-09-17

## 2024-09-17 RX ADMIN — LIDOCAINE 1 PATCH: 4 PATCH TOPICAL at 11:25

## 2024-09-17 RX ADMIN — KETOROLAC TROMETHAMINE 30 MG: 30 INJECTION, SOLUTION INTRAMUSCULAR at 11:24

## 2024-09-17 ASSESSMENT — ENCOUNTER SYMPTOMS
BACK PAIN: 1
COUGH: 0
COLOR CHANGE: 0
FEVER: 0
APPETITE CHANGE: 0
NUMBNESS: 0
SHORTNESS OF BREATH: 0
WOUND: 0
ACTIVITY CHANGE: 1
ARTHRALGIAS: 0

## 2024-09-17 ASSESSMENT — ACTIVITIES OF DAILY LIVING (ADL): ADLS_ACUITY_SCORE: 35

## 2024-09-17 ASSESSMENT — COLUMBIA-SUICIDE SEVERITY RATING SCALE - C-SSRS
6. HAVE YOU EVER DONE ANYTHING, STARTED TO DO ANYTHING, OR PREPARED TO DO ANYTHING TO END YOUR LIFE?: NO
1. IN THE PAST MONTH, HAVE YOU WISHED YOU WERE DEAD OR WISHED YOU COULD GO TO SLEEP AND NOT WAKE UP?: NO
2. HAVE YOU ACTUALLY HAD ANY THOUGHTS OF KILLING YOURSELF IN THE PAST MONTH?: NO

## 2024-09-17 NOTE — ED TRIAGE NOTES
C/o shoulder pain    left Upper back/shoulder pain  Chest pain, dizziness, headache, nausea    No known injury  Sx started a few days ago    tyl

## 2024-09-17 NOTE — DISCHARGE INSTRUCTIONS
You can take 650-1000mg of tylenol every 6 hours as needed, max of 3000mg in 24 hours and 600-800mg of ibuprofen every 8 hours as needed, max of 2400mg in 24 hours.     Ice for 15-20 minutes every 2-3 hours. Please make sure to protect skin to prevent frost bite.     Lidocaine patches can be worn for 12 hours. You must be patch free for 12 hours before applying a new patch.     Return with any new or concerning symptoms. Follow up in the clinic for a recheck.

## 2024-09-17 NOTE — ED PROVIDER NOTES
History     Chief Complaint   Patient presents with    Shoulder Pain     HPI  Karrie Oliveira is a 28 year old female who presents to the urgent care with a 3 day history of left upper back pain that radiates into chest with movement. She denies chest pressure, shortness of breath, and recent illness. Denies falls and injures. Works as a . Has taken acetaminophen without change in pain. No OTC medications today.     Allergies:  No Known Allergies    Problem List:    Patient Active Problem List    Diagnosis Date Noted    JUAN (generalized anxiety disorder) 09/28/2018     Priority: Medium    Panic attack 09/28/2018     Priority: Medium    Social anxiety disorder 09/28/2018     Priority: Medium    Major depressive disorder, recurrent episode, mild (H24) 09/28/2018     Priority: Medium    Morbid obesity (H) 09/21/2018     Priority: Medium    Fatty liver 09/21/2018     Priority: Medium    DUB (dysfunctional uterine bleeding) 01/30/2018     Priority: Medium    PCO (polycystic ovaries) 01/30/2018     Priority: Medium    Prediabetes 09/02/2016     Priority: Medium    ACP (advance care planning) 05/12/2016     Priority: Medium     Advance Care Planning 6/29/2017: ACP Review of Chart / Resources Provided:  Reviewed chart for advance care plan.  Karrie Oliveira has no plan or code status on file. Discussed available resources and provided with information.   Added by Adilia Mendez  Advance Care Planning 5/12/2016: ACP Review of Chart / Resources Provided:  Reviewed chart for advance care plan.  Karrie Oliveira has no plan or code status on file. Discussed available resources and provided with information. Confirmed code status reflects current choices pending further ACP discussions.  Confirmed/documented legally designated decision makers.  Added by Ruby Esposito            Esophageal reflux 08/18/2015     Priority: Medium        Past Medical History:    Past Medical History:   Diagnosis Date    GERD  (gastroesophageal reflux disease)     Obesity     Prediabetes 9/2/2016       Past Surgical History:    Past Surgical History:   Procedure Laterality Date    MYRINGOTOMY, INSERT TUBE BILATERAL, COMBINED      TONSILLECTOMY         Family History:    Family History   Problem Relation Age of Onset    Thyroid Disease Mother     Diabetes Maternal Grandmother     Thyroid Disease Maternal Grandfather     Asthma No family hx of     Coronary Artery Disease No family hx of     Hypertension No family hx of        Social History:  Marital Status:  Single [1]  Social History     Tobacco Use    Smoking status: Never    Smokeless tobacco: Never   Substance Use Topics    Alcohol use: No     Alcohol/week: 0.0 standard drinks of alcohol    Drug use: No        Medications:    FLUoxetine (PROZAC) 20 MG capsule  ibuprofen 200 MG capsule  levonorgestrel (MIRENA) 20 MCG/24HR IUD  omeprazole (PRILOSEC) 40 MG DR capsule  vitamin D2 (ERGOCALCIFEROL) 46102 units (1250 mcg) capsule          Review of Systems   Constitutional:  Positive for activity change. Negative for appetite change and fever.   Respiratory:  Negative for cough and shortness of breath.    Cardiovascular:  Negative for chest pain.   Musculoskeletal:  Positive for back pain. Negative for arthralgias.   Skin:  Negative for color change, pallor, rash and wound.   Neurological:  Negative for numbness.   All other systems reviewed and are negative.      Physical Exam   BP: 147/85  Pulse: 68  Temp: 97.5  F (36.4  C)  Resp: 19  SpO2: 98 %      Physical Exam  Vitals and nursing note reviewed.   Constitutional:       General: She is not in acute distress.     Appearance: Normal appearance. She is not ill-appearing or toxic-appearing.   Cardiovascular:      Rate and Rhythm: Normal rate and regular rhythm.      Heart sounds: Normal heart sounds. No murmur heard.  Pulmonary:      Breath sounds: No wheezing, rhonchi or rales.   Musculoskeletal:         General: Tenderness present. No  swelling, deformity or signs of injury.      Left shoulder: No swelling, tenderness or bony tenderness. Normal range of motion. Normal pulse.      Cervical back: No tenderness or bony tenderness.      Thoracic back: No tenderness or bony tenderness. Normal range of motion.      Lumbar back: No tenderness or bony tenderness. Normal range of motion.        Back:       Comments: Focal and reproducible tenderness to left upper back.    Skin:     General: Skin is warm and dry.      Capillary Refill: Capillary refill takes less than 2 seconds.      Coloration: Skin is not pale.      Findings: No bruising or erythema.   Neurological:      General: No focal deficit present.      Mental Status: She is alert and oriented to person, place, and time.   Psychiatric:         Mood and Affect: Mood normal.         Behavior: Behavior normal.         Thought Content: Thought content normal.         Judgment: Judgment normal.         ED Course        Procedures       Results for orders placed or performed during the hospital encounter of 09/17/24 (from the past 24 hour(s))   Chest XR,  PA & LAT    Narrative    Exam:  XR CHEST 2 VIEWS    HISTORY: left upper back pain into chest.    COMPARISON:  11/23/2022    FINDINGS:     The cardiomediastinal contours are normal.      No focal consolidation, effusion, or pneumothorax.      No acute osseous abnormality.       Impression    IMPRESSION:      No acute cardiopulmonary process.      ROSANNA DUARTE MD         SYSTEM ID:  [id number]       Medications   Lidocaine (LIDOCARE) 4 % Patch 1 patch (1 patch Transdermal $Patch/Med Applied 9/17/24 1125)   ketorolac (TORADOL) injection 30 mg (30 mg Intramuscular $Given 9/17/24 1124)       Assessments & Plan (with Medical Decision Making)     I have reviewed the nursing notes.    I have reviewed the findings, diagnosis, plan and need for follow up with the patient.  Karrie Oliveira is a 28 year old female who presents to the urgent care with a 3 day  history of left upper back pain that radiates into chest with movement. She denies chest pressure, shortness of breath, and recent illness. Denies falls and injures. Works as a . Has taken acetaminophen without change in pain. No OTC medications today.     MDM: vital signs normal, afebrile. Non toxic in appearance with no noted distress. Lungs clear, heart tones regular. Tenderness along left trapezius muscle is focal and reproducible. She has full ROM of left upper extremity. Strong pulses. Cap refill within 2 seconds. Low likelihood for ACS with pain that is focal and reproducible. Toradol injection and lidocaine patch given in the UC with reduction in pain. CXR reviewed with no acute cardiopulmonary process, per radiologist. Encouraged rest and ice. Supportive measures and return precautions discussed. She is in agreement with plan.     (M54.9) Upper back pain on left side  Plan: You can take 650-1000mg of tylenol every 6 hours as needed, max of 3000mg in 24 hours and 600-800mg of ibuprofen every 8 hours as needed, max of 2400mg in 24 hours.     Ice for 15-20 minutes every 2-3 hours. Please make sure to protect skin to prevent frost bite.     Lidocaine patches can be worn for 12 hours. You must be patch free for 12 hours before applying a new patch.     Return with any new or concerning symptoms. Follow up in the clinic for a recheck. Understanding verbalized.       Discharge Medication List as of 9/17/2024 11:57 AM          Final diagnoses:   Upper back pain on left side       9/17/2024   [facility]       Bhavana Bazan NP  09/17/24 [id number]